# Patient Record
Sex: MALE | Race: WHITE | NOT HISPANIC OR LATINO | Employment: FULL TIME
[De-identification: names, ages, dates, MRNs, and addresses within clinical notes are randomized per-mention and may not be internally consistent; named-entity substitution may affect disease eponyms.]

---

## 2017-03-03 ENCOUNTER — RECORDS - HEALTHEAST (OUTPATIENT)
Dept: ADMINISTRATIVE | Facility: OTHER | Age: 62
End: 2017-03-03

## 2017-06-23 ENCOUNTER — COMMUNICATION - HEALTHEAST (OUTPATIENT)
Dept: FAMILY MEDICINE | Facility: CLINIC | Age: 62
End: 2017-06-23

## 2017-06-23 DIAGNOSIS — E11.9 TYPE 2 DIABETES MELLITUS (H): ICD-10-CM

## 2017-06-23 DIAGNOSIS — E78.49 FAMILIAL COMBINED HYPERLIPIDEMIA: ICD-10-CM

## 2017-06-23 DIAGNOSIS — I10 UNSPECIFIED ESSENTIAL HYPERTENSION: ICD-10-CM

## 2017-07-13 ENCOUNTER — OFFICE VISIT - HEALTHEAST (OUTPATIENT)
Dept: FAMILY MEDICINE | Facility: CLINIC | Age: 62
End: 2017-07-13

## 2017-07-13 DIAGNOSIS — I10 UNSPECIFIED ESSENTIAL HYPERTENSION: ICD-10-CM

## 2017-07-13 DIAGNOSIS — E78.49 FAMILIAL COMBINED HYPERLIPIDEMIA: ICD-10-CM

## 2017-07-13 DIAGNOSIS — E66.9 OBESITY: ICD-10-CM

## 2017-07-13 DIAGNOSIS — E78.00 PURE HYPERCHOLESTEROLEMIA: ICD-10-CM

## 2017-07-13 DIAGNOSIS — E11.9 TYPE 2 DIABETES MELLITUS (H): ICD-10-CM

## 2017-07-13 DIAGNOSIS — I10 ESSENTIAL HYPERTENSION WITH GOAL BLOOD PRESSURE LESS THAN 130/80: ICD-10-CM

## 2017-07-13 DIAGNOSIS — E11.9 DIABETES MELLITUS (H): ICD-10-CM

## 2017-07-13 DIAGNOSIS — Z72.0 TOBACCO ABUSE: ICD-10-CM

## 2017-07-13 LAB — HBA1C MFR BLD: 7.3 % (ref 3.5–6)

## 2017-07-13 ASSESSMENT — MIFFLIN-ST. JEOR: SCORE: 2011.89

## 2017-07-14 ENCOUNTER — COMMUNICATION - HEALTHEAST (OUTPATIENT)
Dept: FAMILY MEDICINE | Facility: CLINIC | Age: 62
End: 2017-07-14

## 2018-01-26 ENCOUNTER — OFFICE VISIT - HEALTHEAST (OUTPATIENT)
Dept: FAMILY MEDICINE | Facility: CLINIC | Age: 63
End: 2018-01-26

## 2018-01-26 ENCOUNTER — COMMUNICATION - HEALTHEAST (OUTPATIENT)
Dept: FAMILY MEDICINE | Facility: CLINIC | Age: 63
End: 2018-01-26

## 2018-01-26 DIAGNOSIS — I10 ESSENTIAL HYPERTENSION: ICD-10-CM

## 2018-01-26 DIAGNOSIS — E78.00 PURE HYPERCHOLESTEROLEMIA: ICD-10-CM

## 2018-01-26 DIAGNOSIS — E78.49 FAMILIAL COMBINED HYPERLIPIDEMIA: ICD-10-CM

## 2018-01-26 DIAGNOSIS — E11.9 DIABETES MELLITUS (H): ICD-10-CM

## 2018-01-26 DIAGNOSIS — N52.9 IMPOTENCE OF ORGANIC ORIGIN: ICD-10-CM

## 2018-01-26 LAB
ALBUMIN SERPL-MCNC: 4.1 G/DL (ref 3.5–5)
ALP SERPL-CCNC: 65 U/L (ref 45–120)
ALT SERPL W P-5'-P-CCNC: 24 U/L (ref 0–45)
ANION GAP SERPL CALCULATED.3IONS-SCNC: 13 MMOL/L (ref 5–18)
AST SERPL W P-5'-P-CCNC: 19 U/L (ref 0–40)
BILIRUB SERPL-MCNC: 0.7 MG/DL (ref 0–1)
BUN SERPL-MCNC: 17 MG/DL (ref 8–22)
CALCIUM SERPL-MCNC: 9.2 MG/DL (ref 8.5–10.5)
CHLORIDE BLD-SCNC: 104 MMOL/L (ref 98–107)
CHOLEST SERPL-MCNC: 161 MG/DL
CO2 SERPL-SCNC: 26 MMOL/L (ref 22–31)
CREAT SERPL-MCNC: 0.84 MG/DL (ref 0.7–1.3)
FASTING STATUS PATIENT QL REPORTED: YES
GFR SERPL CREATININE-BSD FRML MDRD: >60 ML/MIN/1.73M2
GLUCOSE BLD-MCNC: 114 MG/DL (ref 70–125)
HBA1C MFR BLD: 7.7 % (ref 3.5–6)
HDLC SERPL-MCNC: 38 MG/DL
LDLC SERPL CALC-MCNC: 87 MG/DL
POTASSIUM BLD-SCNC: 3.9 MMOL/L (ref 3.5–5)
PROT SERPL-MCNC: 7 G/DL (ref 6–8)
PSA SERPL-MCNC: 1.3 NG/ML (ref 0–4.5)
SODIUM SERPL-SCNC: 143 MMOL/L (ref 136–145)
TRIGL SERPL-MCNC: 182 MG/DL
VIT B12 SERPL-MCNC: 696 PG/ML (ref 213–816)

## 2018-01-26 ASSESSMENT — MIFFLIN-ST. JEOR: SCORE: 2018.69

## 2018-02-07 ENCOUNTER — COMMUNICATION - HEALTHEAST (OUTPATIENT)
Dept: FAMILY MEDICINE | Facility: CLINIC | Age: 63
End: 2018-02-07

## 2018-03-05 ENCOUNTER — COMMUNICATION - HEALTHEAST (OUTPATIENT)
Dept: FAMILY MEDICINE | Facility: CLINIC | Age: 63
End: 2018-03-05

## 2018-03-06 ENCOUNTER — RECORDS - HEALTHEAST (OUTPATIENT)
Dept: ADMINISTRATIVE | Facility: OTHER | Age: 63
End: 2018-03-06

## 2018-03-07 ENCOUNTER — RECORDS - HEALTHEAST (OUTPATIENT)
Dept: ADMINISTRATIVE | Facility: OTHER | Age: 63
End: 2018-03-07

## 2018-03-19 ENCOUNTER — COMMUNICATION - HEALTHEAST (OUTPATIENT)
Dept: FAMILY MEDICINE | Facility: CLINIC | Age: 63
End: 2018-03-19

## 2018-03-20 ENCOUNTER — RECORDS - HEALTHEAST (OUTPATIENT)
Dept: ADMINISTRATIVE | Facility: OTHER | Age: 63
End: 2018-03-20

## 2018-05-18 ENCOUNTER — RECORDS - HEALTHEAST (OUTPATIENT)
Dept: GENERAL RADIOLOGY | Facility: CLINIC | Age: 63
End: 2018-05-18

## 2018-05-18 ENCOUNTER — OFFICE VISIT - HEALTHEAST (OUTPATIENT)
Dept: FAMILY MEDICINE | Facility: CLINIC | Age: 63
End: 2018-05-18

## 2018-05-18 DIAGNOSIS — M54.50 ACUTE RIGHT-SIDED LOW BACK PAIN WITHOUT SCIATICA: ICD-10-CM

## 2018-05-18 DIAGNOSIS — M54.50 LOW BACK PAIN: ICD-10-CM

## 2018-05-18 ASSESSMENT — MIFFLIN-ST. JEOR: SCORE: 2011.89

## 2018-05-21 ENCOUNTER — COMMUNICATION - HEALTHEAST (OUTPATIENT)
Dept: FAMILY MEDICINE | Facility: CLINIC | Age: 63
End: 2018-05-21

## 2018-05-22 ENCOUNTER — COMMUNICATION - HEALTHEAST (OUTPATIENT)
Dept: FAMILY MEDICINE | Facility: CLINIC | Age: 63
End: 2018-05-22

## 2018-07-27 ENCOUNTER — OFFICE VISIT - HEALTHEAST (OUTPATIENT)
Dept: FAMILY MEDICINE | Facility: CLINIC | Age: 63
End: 2018-07-27

## 2018-07-27 DIAGNOSIS — Z76.0 ENCOUNTER FOR MEDICATION REFILL: ICD-10-CM

## 2018-07-27 DIAGNOSIS — E11.9 DIABETES MELLITUS (H): ICD-10-CM

## 2018-07-27 DIAGNOSIS — E78.49 FAMILIAL COMBINED HYPERLIPIDEMIA: ICD-10-CM

## 2018-07-27 DIAGNOSIS — I10 ESSENTIAL HYPERTENSION: ICD-10-CM

## 2018-07-27 LAB
ALBUMIN SERPL-MCNC: 4.3 G/DL (ref 3.5–5)
ALP SERPL-CCNC: 56 U/L (ref 45–120)
ALT SERPL W P-5'-P-CCNC: 22 U/L (ref 0–45)
ANION GAP SERPL CALCULATED.3IONS-SCNC: 13 MMOL/L (ref 5–18)
AST SERPL W P-5'-P-CCNC: 24 U/L (ref 0–40)
BILIRUB SERPL-MCNC: 0.9 MG/DL (ref 0–1)
BUN SERPL-MCNC: 18 MG/DL (ref 8–22)
CALCIUM SERPL-MCNC: 9.5 MG/DL (ref 8.5–10.5)
CHLORIDE BLD-SCNC: 105 MMOL/L (ref 98–107)
CHOLEST SERPL-MCNC: 134 MG/DL
CO2 SERPL-SCNC: 26 MMOL/L (ref 22–31)
CREAT SERPL-MCNC: 0.94 MG/DL (ref 0.7–1.3)
FASTING STATUS PATIENT QL REPORTED: YES
GFR SERPL CREATININE-BSD FRML MDRD: >60 ML/MIN/1.73M2
GLUCOSE BLD-MCNC: 76 MG/DL (ref 70–125)
HBA1C MFR BLD: 6.5 % (ref 3.5–6)
HDLC SERPL-MCNC: 33 MG/DL
LDLC SERPL CALC-MCNC: 80 MG/DL
POTASSIUM BLD-SCNC: 4 MMOL/L (ref 3.5–5)
PROT SERPL-MCNC: 6.8 G/DL (ref 6–8)
SODIUM SERPL-SCNC: 144 MMOL/L (ref 136–145)
TRIGL SERPL-MCNC: 107 MG/DL
VIT B12 SERPL-MCNC: 420 PG/ML (ref 213–816)

## 2018-07-27 ASSESSMENT — MIFFLIN-ST. JEOR: SCORE: 1939.31

## 2018-07-28 ENCOUNTER — COMMUNICATION - HEALTHEAST (OUTPATIENT)
Dept: FAMILY MEDICINE | Facility: CLINIC | Age: 63
End: 2018-07-28

## 2018-07-28 DIAGNOSIS — E78.49 FAMILIAL COMBINED HYPERLIPIDEMIA: ICD-10-CM

## 2018-09-08 ENCOUNTER — RECORDS - HEALTHEAST (OUTPATIENT)
Dept: ADMINISTRATIVE | Facility: OTHER | Age: 63
End: 2018-09-08

## 2018-10-21 ENCOUNTER — RECORDS - HEALTHEAST (OUTPATIENT)
Dept: ADMINISTRATIVE | Facility: OTHER | Age: 63
End: 2018-10-21

## 2019-01-24 ENCOUNTER — OFFICE VISIT - HEALTHEAST (OUTPATIENT)
Dept: FAMILY MEDICINE | Facility: CLINIC | Age: 64
End: 2019-01-24

## 2019-01-24 DIAGNOSIS — I10 ESSENTIAL HYPERTENSION: ICD-10-CM

## 2019-01-24 DIAGNOSIS — E78.49 FAMILIAL COMBINED HYPERLIPIDEMIA: ICD-10-CM

## 2019-01-24 DIAGNOSIS — E78.00 PURE HYPERCHOLESTEROLEMIA: ICD-10-CM

## 2019-01-24 DIAGNOSIS — E11.9 DIABETES MELLITUS (H): ICD-10-CM

## 2019-01-24 LAB
ALBUMIN SERPL-MCNC: 4.2 G/DL (ref 3.5–5)
ALP SERPL-CCNC: 56 U/L (ref 45–120)
ALT SERPL W P-5'-P-CCNC: 22 U/L (ref 0–45)
ANION GAP SERPL CALCULATED.3IONS-SCNC: 12 MMOL/L (ref 5–18)
AST SERPL W P-5'-P-CCNC: 19 U/L (ref 0–40)
BILIRUB SERPL-MCNC: 0.6 MG/DL (ref 0–1)
BUN SERPL-MCNC: 18 MG/DL (ref 8–22)
CALCIUM SERPL-MCNC: 9.3 MG/DL (ref 8.5–10.5)
CHLORIDE BLD-SCNC: 104 MMOL/L (ref 98–107)
CO2 SERPL-SCNC: 28 MMOL/L (ref 22–31)
CREAT SERPL-MCNC: 0.97 MG/DL (ref 0.7–1.3)
CREAT UR-MCNC: 132 MG/DL
GFR SERPL CREATININE-BSD FRML MDRD: >60 ML/MIN/1.73M2
GLUCOSE BLD-MCNC: 117 MG/DL (ref 70–125)
HBA1C MFR BLD: 6.8 % (ref 3.5–6)
MICROALBUMIN UR-MCNC: 6.47 MG/DL (ref 0–1.99)
MICROALBUMIN/CREAT UR: 49 MG/G
POTASSIUM BLD-SCNC: 4 MMOL/L (ref 3.5–5)
PROT SERPL-MCNC: 7 G/DL (ref 6–8)
SODIUM SERPL-SCNC: 144 MMOL/L (ref 136–145)

## 2019-01-24 ASSESSMENT — MIFFLIN-ST. JEOR: SCORE: 1971.06

## 2019-01-25 ENCOUNTER — RECORDS - HEALTHEAST (OUTPATIENT)
Dept: ADMINISTRATIVE | Facility: OTHER | Age: 64
End: 2019-01-25

## 2019-04-23 ENCOUNTER — COMMUNICATION - HEALTHEAST (OUTPATIENT)
Dept: SCHEDULING | Facility: CLINIC | Age: 64
End: 2019-04-23

## 2019-04-23 ENCOUNTER — COMMUNICATION - HEALTHEAST (OUTPATIENT)
Dept: FAMILY MEDICINE | Facility: CLINIC | Age: 64
End: 2019-04-23

## 2019-04-23 ENCOUNTER — OFFICE VISIT - HEALTHEAST (OUTPATIENT)
Dept: FAMILY MEDICINE | Facility: CLINIC | Age: 64
End: 2019-04-23

## 2019-04-23 DIAGNOSIS — R04.0 BLOODY NOSE: ICD-10-CM

## 2019-04-23 ASSESSMENT — MIFFLIN-ST. JEOR: SCORE: 2002.82

## 2019-04-24 ENCOUNTER — OFFICE VISIT - HEALTHEAST (OUTPATIENT)
Dept: OTOLARYNGOLOGY | Facility: CLINIC | Age: 64
End: 2019-04-24

## 2019-04-24 DIAGNOSIS — R04.0 EPISTAXIS: ICD-10-CM

## 2019-06-11 ENCOUNTER — OFFICE VISIT - HEALTHEAST (OUTPATIENT)
Dept: FAMILY MEDICINE | Facility: CLINIC | Age: 64
End: 2019-06-11

## 2019-06-11 DIAGNOSIS — N50.819 TESTICLE PAIN: ICD-10-CM

## 2019-06-11 LAB
ALBUMIN UR-MCNC: NEGATIVE MG/DL
APPEARANCE UR: CLEAR
BILIRUB UR QL STRIP: NEGATIVE
COLOR UR AUTO: YELLOW
GLUCOSE UR STRIP-MCNC: NEGATIVE MG/DL
HGB UR QL STRIP: NEGATIVE
KETONES UR STRIP-MCNC: NEGATIVE MG/DL
LEUKOCYTE ESTERASE UR QL STRIP: NEGATIVE
NITRATE UR QL: NEGATIVE
PH UR STRIP: 7 [PH] (ref 5–8)
SP GR UR STRIP: 1.02 (ref 1–1.03)
UROBILINOGEN UR STRIP-ACNC: NORMAL

## 2019-06-11 ASSESSMENT — MIFFLIN-ST. JEOR: SCORE: 1993.74

## 2019-06-13 ENCOUNTER — COMMUNICATION - HEALTHEAST (OUTPATIENT)
Dept: FAMILY MEDICINE | Facility: CLINIC | Age: 64
End: 2019-06-13

## 2019-06-14 ENCOUNTER — RECORDS - HEALTHEAST (OUTPATIENT)
Dept: ADMINISTRATIVE | Facility: OTHER | Age: 64
End: 2019-06-14

## 2019-06-18 ENCOUNTER — COMMUNICATION - HEALTHEAST (OUTPATIENT)
Dept: FAMILY MEDICINE | Facility: CLINIC | Age: 64
End: 2019-06-18

## 2019-07-12 ENCOUNTER — OFFICE VISIT - HEALTHEAST (OUTPATIENT)
Dept: FAMILY MEDICINE | Facility: CLINIC | Age: 64
End: 2019-07-12

## 2019-07-12 DIAGNOSIS — Z23 NEED FOR VACCINATION: ICD-10-CM

## 2019-07-12 DIAGNOSIS — E78.00 PURE HYPERCHOLESTEROLEMIA: ICD-10-CM

## 2019-07-12 DIAGNOSIS — G47.33 OBSTRUCTIVE SLEEP APNEA (ADULT) (PEDIATRIC): ICD-10-CM

## 2019-07-12 DIAGNOSIS — E11.9 DIABETES MELLITUS (H): ICD-10-CM

## 2019-07-12 DIAGNOSIS — I10 ESSENTIAL HYPERTENSION: ICD-10-CM

## 2019-07-12 LAB
ANION GAP SERPL CALCULATED.3IONS-SCNC: 12 MMOL/L (ref 5–18)
BUN SERPL-MCNC: 16 MG/DL (ref 8–22)
CALCIUM SERPL-MCNC: 9.4 MG/DL (ref 8.5–10.5)
CHLORIDE BLD-SCNC: 104 MMOL/L (ref 98–107)
CHOLEST SERPL-MCNC: 161 MG/DL
CO2 SERPL-SCNC: 26 MMOL/L (ref 22–31)
CREAT SERPL-MCNC: 0.84 MG/DL (ref 0.7–1.3)
FASTING STATUS PATIENT QL REPORTED: YES
GFR SERPL CREATININE-BSD FRML MDRD: >60 ML/MIN/1.73M2
GLUCOSE BLD-MCNC: 108 MG/DL (ref 70–125)
HBA1C MFR BLD: 6.9 % (ref 3.5–6)
HDLC SERPL-MCNC: 41 MG/DL
LDLC SERPL CALC-MCNC: 89 MG/DL
POTASSIUM BLD-SCNC: 3.7 MMOL/L (ref 3.5–5)
PSA SERPL-MCNC: 1 NG/ML (ref 0–4.5)
SODIUM SERPL-SCNC: 142 MMOL/L (ref 136–145)
TRIGL SERPL-MCNC: 157 MG/DL
VIT B12 SERPL-MCNC: 339 PG/ML (ref 213–816)

## 2019-07-12 ASSESSMENT — MIFFLIN-ST. JEOR: SCORE: 1984.67

## 2019-10-11 ENCOUNTER — COMMUNICATION - HEALTHEAST (OUTPATIENT)
Dept: FAMILY MEDICINE | Facility: CLINIC | Age: 64
End: 2019-10-11

## 2019-10-11 DIAGNOSIS — E11.9 DIABETES MELLITUS (H): ICD-10-CM

## 2019-10-14 ENCOUNTER — OFFICE VISIT - HEALTHEAST (OUTPATIENT)
Dept: FAMILY MEDICINE | Facility: CLINIC | Age: 64
End: 2019-10-14

## 2019-10-14 DIAGNOSIS — R10.32 LEFT GROIN PAIN: ICD-10-CM

## 2019-10-14 DIAGNOSIS — M54.42 ACUTE LEFT-SIDED LOW BACK PAIN WITH LEFT-SIDED SCIATICA: ICD-10-CM

## 2019-10-14 ASSESSMENT — MIFFLIN-ST. JEOR: SCORE: 1984.67

## 2019-11-07 ENCOUNTER — OFFICE VISIT - HEALTHEAST (OUTPATIENT)
Dept: FAMILY MEDICINE | Facility: CLINIC | Age: 64
End: 2019-11-07

## 2019-11-07 DIAGNOSIS — R29.898 LEFT LEG WEAKNESS: ICD-10-CM

## 2019-11-07 DIAGNOSIS — M54.42 ACUTE BILATERAL LOW BACK PAIN WITH LEFT-SIDED SCIATICA: ICD-10-CM

## 2019-11-18 ENCOUNTER — HOSPITAL ENCOUNTER (OUTPATIENT)
Dept: MRI IMAGING | Facility: CLINIC | Age: 64
Discharge: HOME | End: 2019-11-18
Attending: NURSE PRACTITIONER

## 2019-11-18 DIAGNOSIS — R29.898 LEFT LEG WEAKNESS: ICD-10-CM

## 2019-11-18 DIAGNOSIS — M54.42 ACUTE BILATERAL LOW BACK PAIN WITH LEFT-SIDED SCIATICA: ICD-10-CM

## 2019-11-18 LAB
CREAT BLD-MCNC: 0.7 MG/DL (ref 0.7–1.3)
GFR SERPL CREATININE-BSD FRML MDRD: >60 ML/MIN/1.73M2

## 2019-11-26 ENCOUNTER — OFFICE VISIT - HEALTHEAST (OUTPATIENT)
Dept: PHYSICAL MEDICINE AND REHAB | Facility: REHABILITATION | Age: 64
End: 2019-11-26

## 2019-11-26 DIAGNOSIS — M25.452 EFFUSION OF LEFT HIP: ICD-10-CM

## 2019-11-26 DIAGNOSIS — M47.816 LUMBAR SPONDYLOSIS: ICD-10-CM

## 2019-11-26 DIAGNOSIS — M25.552 HIP PAIN, LEFT: ICD-10-CM

## 2019-11-26 ASSESSMENT — MIFFLIN-ST. JEOR: SCORE: 1984.67

## 2019-12-05 ENCOUNTER — HOSPITAL ENCOUNTER (OUTPATIENT)
Dept: MRI IMAGING | Facility: CLINIC | Age: 64
Discharge: HOME | End: 2019-12-05
Attending: PHYSICAL MEDICINE & REHABILITATION

## 2019-12-05 DIAGNOSIS — M25.452 EFFUSION OF LEFT HIP: ICD-10-CM

## 2019-12-05 DIAGNOSIS — M25.552 HIP PAIN, LEFT: ICD-10-CM

## 2019-12-06 ENCOUNTER — COMMUNICATION - HEALTHEAST (OUTPATIENT)
Dept: PHYSICAL MEDICINE AND REHAB | Facility: CLINIC | Age: 64
End: 2019-12-06

## 2019-12-06 DIAGNOSIS — M84.352A STRESS FRACTURE OF LEFT HIP: ICD-10-CM

## 2019-12-06 DIAGNOSIS — M16.12 OSTEOARTHRITIS OF LEFT HIP, UNSPECIFIED OSTEOARTHRITIS TYPE: ICD-10-CM

## 2019-12-12 ENCOUNTER — RECORDS - HEALTHEAST (OUTPATIENT)
Dept: ADMINISTRATIVE | Facility: OTHER | Age: 64
End: 2019-12-12

## 2020-01-09 ENCOUNTER — RECORDS - HEALTHEAST (OUTPATIENT)
Dept: ADMINISTRATIVE | Facility: OTHER | Age: 65
End: 2020-01-09

## 2020-01-09 ENCOUNTER — OFFICE VISIT - HEALTHEAST (OUTPATIENT)
Dept: FAMILY MEDICINE | Facility: CLINIC | Age: 65
End: 2020-01-09

## 2020-01-09 DIAGNOSIS — M25.552 HIP PAIN, LEFT: ICD-10-CM

## 2020-01-09 DIAGNOSIS — E78.00 PURE HYPERCHOLESTEROLEMIA: ICD-10-CM

## 2020-01-09 DIAGNOSIS — G47.33 OBSTRUCTIVE SLEEP APNEA (ADULT) (PEDIATRIC): ICD-10-CM

## 2020-01-09 DIAGNOSIS — I10 ESSENTIAL HYPERTENSION: ICD-10-CM

## 2020-01-09 DIAGNOSIS — E11.9 DIABETES MELLITUS (H): ICD-10-CM

## 2020-01-09 LAB — HBA1C MFR BLD: 7.7 % (ref 3.5–6)

## 2020-01-09 ASSESSMENT — MIFFLIN-ST. JEOR: SCORE: 1972.77

## 2020-01-10 LAB
ANION GAP SERPL CALCULATED.3IONS-SCNC: 12 MMOL/L (ref 5–18)
BUN SERPL-MCNC: 21 MG/DL (ref 8–22)
CALCIUM SERPL-MCNC: 9.8 MG/DL (ref 8.5–10.5)
CHLORIDE BLD-SCNC: 104 MMOL/L (ref 98–107)
CHOLEST SERPL-MCNC: 174 MG/DL
CO2 SERPL-SCNC: 29 MMOL/L (ref 22–31)
CREAT SERPL-MCNC: 1.05 MG/DL (ref 0.7–1.3)
CREAT UR-MCNC: 207.4 MG/DL
FASTING STATUS PATIENT QL REPORTED: YES
GFR SERPL CREATININE-BSD FRML MDRD: >60 ML/MIN/1.73M2
GLUCOSE BLD-MCNC: 127 MG/DL (ref 70–125)
HDLC SERPL-MCNC: 40 MG/DL
LDLC SERPL CALC-MCNC: 103 MG/DL
MICROALBUMIN UR-MCNC: 9.87 MG/DL (ref 0–1.99)
MICROALBUMIN/CREAT UR: 47.6 MG/G
POTASSIUM BLD-SCNC: 3.5 MMOL/L (ref 3.5–5)
SODIUM SERPL-SCNC: 145 MMOL/L (ref 136–145)
TRIGL SERPL-MCNC: 156 MG/DL

## 2020-06-16 ENCOUNTER — COMMUNICATION - HEALTHEAST (OUTPATIENT)
Dept: FAMILY MEDICINE | Facility: CLINIC | Age: 65
End: 2020-06-16

## 2020-06-19 ENCOUNTER — RECORDS - HEALTHEAST (OUTPATIENT)
Dept: ADMINISTRATIVE | Facility: OTHER | Age: 65
End: 2020-06-19

## 2020-06-29 ENCOUNTER — COMMUNICATION - HEALTHEAST (OUTPATIENT)
Dept: FAMILY MEDICINE | Facility: CLINIC | Age: 65
End: 2020-06-29

## 2020-06-30 ENCOUNTER — COMMUNICATION - HEALTHEAST (OUTPATIENT)
Dept: FAMILY MEDICINE | Facility: CLINIC | Age: 65
End: 2020-06-30

## 2020-06-30 ENCOUNTER — OFFICE VISIT - HEALTHEAST (OUTPATIENT)
Dept: FAMILY MEDICINE | Facility: CLINIC | Age: 65
End: 2020-06-30

## 2020-06-30 DIAGNOSIS — E78.00 PURE HYPERCHOLESTEROLEMIA: ICD-10-CM

## 2020-06-30 DIAGNOSIS — E11.9 TYPE 2 DIABETES MELLITUS WITHOUT COMPLICATION, WITHOUT LONG-TERM CURRENT USE OF INSULIN (H): ICD-10-CM

## 2020-06-30 DIAGNOSIS — I10 ESSENTIAL HYPERTENSION: ICD-10-CM

## 2020-06-30 DIAGNOSIS — E11.9 DIABETES MELLITUS (H): ICD-10-CM

## 2020-06-30 DIAGNOSIS — Z00.00 WELLNESS EXAMINATION: ICD-10-CM

## 2020-06-30 LAB
ANION GAP SERPL CALCULATED.3IONS-SCNC: 13 MMOL/L (ref 5–18)
BUN SERPL-MCNC: 14 MG/DL (ref 8–22)
CALCIUM SERPL-MCNC: 8.9 MG/DL (ref 8.5–10.5)
CHLORIDE BLD-SCNC: 101 MMOL/L (ref 98–107)
CHOLEST SERPL-MCNC: 170 MG/DL
CO2 SERPL-SCNC: 26 MMOL/L (ref 22–31)
CREAT SERPL-MCNC: 0.85 MG/DL (ref 0.7–1.3)
ERYTHROCYTE [DISTWIDTH] IN BLOOD BY AUTOMATED COUNT: 12.1 % (ref 11–14.5)
FASTING STATUS PATIENT QL REPORTED: YES
GFR SERPL CREATININE-BSD FRML MDRD: >60 ML/MIN/1.73M2
GLUCOSE BLD-MCNC: 145 MG/DL (ref 70–125)
HBA1C MFR BLD: 6.7 % (ref 3.5–6)
HCT VFR BLD AUTO: 44.9 % (ref 40–54)
HDLC SERPL-MCNC: 38 MG/DL
HGB BLD-MCNC: 15.3 G/DL (ref 14–18)
LDLC SERPL CALC-MCNC: 106 MG/DL
MCH RBC QN AUTO: 31.7 PG (ref 27–34)
MCHC RBC AUTO-ENTMCNC: 34.1 G/DL (ref 32–36)
MCV RBC AUTO: 93 FL (ref 80–100)
PLATELET # BLD AUTO: 269 THOU/UL (ref 140–440)
PMV BLD AUTO: 7.4 FL (ref 7–10)
POTASSIUM BLD-SCNC: 3.9 MMOL/L (ref 3.5–5)
PSA SERPL-MCNC: 0.7 NG/ML (ref 0–4.5)
RBC # BLD AUTO: 4.83 MILL/UL (ref 4.4–6.2)
SODIUM SERPL-SCNC: 140 MMOL/L (ref 136–145)
TRIGL SERPL-MCNC: 131 MG/DL
WBC: 6.1 THOU/UL (ref 4–11)

## 2020-07-28 ENCOUNTER — COMMUNICATION - HEALTHEAST (OUTPATIENT)
Dept: FAMILY MEDICINE | Facility: CLINIC | Age: 65
End: 2020-07-28

## 2020-07-28 DIAGNOSIS — M25.552 HIP PAIN, LEFT: ICD-10-CM

## 2020-08-13 ENCOUNTER — RECORDS - HEALTHEAST (OUTPATIENT)
Dept: ADMINISTRATIVE | Facility: OTHER | Age: 65
End: 2020-08-13

## 2020-09-10 ENCOUNTER — OFFICE VISIT - HEALTHEAST (OUTPATIENT)
Dept: FAMILY MEDICINE | Facility: CLINIC | Age: 65
End: 2020-09-10

## 2020-09-10 DIAGNOSIS — M16.12 PRIMARY OSTEOARTHRITIS OF LEFT HIP: ICD-10-CM

## 2020-09-10 DIAGNOSIS — Z01.818 PRE-OPERATIVE EXAMINATION: ICD-10-CM

## 2020-09-10 DIAGNOSIS — K21.9 GASTROESOPHAGEAL REFLUX DISEASE WITHOUT ESOPHAGITIS: ICD-10-CM

## 2020-09-10 DIAGNOSIS — Z23 NEED FOR VACCINATION: ICD-10-CM

## 2020-09-10 LAB
ANION GAP SERPL CALCULATED.3IONS-SCNC: 11 MMOL/L (ref 5–18)
BUN SERPL-MCNC: 13 MG/DL (ref 8–22)
CALCIUM SERPL-MCNC: 9.4 MG/DL (ref 8.5–10.5)
CHLORIDE BLD-SCNC: 100 MMOL/L (ref 98–107)
CO2 SERPL-SCNC: 29 MMOL/L (ref 22–31)
CREAT SERPL-MCNC: 0.84 MG/DL (ref 0.7–1.3)
ERYTHROCYTE [DISTWIDTH] IN BLOOD BY AUTOMATED COUNT: 12.4 % (ref 11–14.5)
GFR SERPL CREATININE-BSD FRML MDRD: >60 ML/MIN/1.73M2
GLUCOSE BLD-MCNC: 134 MG/DL (ref 70–125)
HCT VFR BLD AUTO: 46.1 % (ref 40–54)
HGB BLD-MCNC: 15.5 G/DL (ref 14–18)
MCH RBC QN AUTO: 30.3 PG (ref 27–34)
MCHC RBC AUTO-ENTMCNC: 33.7 G/DL (ref 32–36)
MCV RBC AUTO: 90 FL (ref 80–100)
PLATELET # BLD AUTO: 272 THOU/UL (ref 140–440)
PMV BLD AUTO: 7.2 FL (ref 7–10)
POTASSIUM BLD-SCNC: 4.1 MMOL/L (ref 3.5–5)
RBC # BLD AUTO: 5.12 MILL/UL (ref 4.4–6.2)
SODIUM SERPL-SCNC: 140 MMOL/L (ref 136–145)
WBC: 6.6 THOU/UL (ref 4–11)

## 2020-09-10 ASSESSMENT — MIFFLIN-ST. JEOR: SCORE: 1995.45

## 2020-09-15 LAB
ATRIAL RATE - MUSE: 70 BPM
DIASTOLIC BLOOD PRESSURE - MUSE: NORMAL
INTERPRETATION ECG - MUSE: NORMAL
P AXIS - MUSE: 21 DEGREES
PR INTERVAL - MUSE: 212 MS
QRS DURATION - MUSE: 104 MS
QT - MUSE: 408 MS
QTC - MUSE: 440 MS
R AXIS - MUSE: -25 DEGREES
SYSTOLIC BLOOD PRESSURE - MUSE: NORMAL
T AXIS - MUSE: 6 DEGREES
VENTRICULAR RATE- MUSE: 70 BPM

## 2020-09-21 ENCOUNTER — RECORDS - HEALTHEAST (OUTPATIENT)
Dept: ADMINISTRATIVE | Facility: OTHER | Age: 65
End: 2020-09-21

## 2020-10-06 ENCOUNTER — RECORDS - HEALTHEAST (OUTPATIENT)
Dept: ADMINISTRATIVE | Facility: OTHER | Age: 65
End: 2020-10-06

## 2020-10-13 ENCOUNTER — RECORDS - HEALTHEAST (OUTPATIENT)
Dept: ADMINISTRATIVE | Facility: OTHER | Age: 65
End: 2020-10-13

## 2020-10-26 ENCOUNTER — AMBULATORY - HEALTHEAST (OUTPATIENT)
Dept: FAMILY MEDICINE | Facility: CLINIC | Age: 65
End: 2020-10-26

## 2020-10-26 DIAGNOSIS — Z23 NEED FOR VACCINATION: ICD-10-CM

## 2020-10-27 ENCOUNTER — AMBULATORY - HEALTHEAST (OUTPATIENT)
Dept: NURSING | Facility: CLINIC | Age: 65
End: 2020-10-27

## 2020-10-27 DIAGNOSIS — Z23 NEED FOR VACCINATION: ICD-10-CM

## 2020-10-28 ENCOUNTER — COMMUNICATION - HEALTHEAST (OUTPATIENT)
Dept: FAMILY MEDICINE | Facility: CLINIC | Age: 65
End: 2020-10-28

## 2020-10-28 DIAGNOSIS — E11.9 DIABETES MELLITUS (H): ICD-10-CM

## 2020-10-29 ENCOUNTER — COMMUNICATION - HEALTHEAST (OUTPATIENT)
Dept: FAMILY MEDICINE | Facility: CLINIC | Age: 65
End: 2020-10-29

## 2020-11-02 RX ORDER — BLOOD SUGAR DIAGNOSTIC
STRIP MISCELLANEOUS
Qty: 200 STRIP | Refills: 3 | Status: SHIPPED | OUTPATIENT
Start: 2020-11-02 | End: 2023-12-22

## 2020-11-03 ENCOUNTER — RECORDS - HEALTHEAST (OUTPATIENT)
Dept: ADMINISTRATIVE | Facility: OTHER | Age: 65
End: 2020-11-03

## 2020-11-05 ENCOUNTER — COMMUNICATION - HEALTHEAST (OUTPATIENT)
Dept: FAMILY MEDICINE | Facility: CLINIC | Age: 65
End: 2020-11-05

## 2020-11-05 DIAGNOSIS — G47.33 OBSTRUCTIVE SLEEP APNEA (ADULT) (PEDIATRIC): ICD-10-CM

## 2020-11-05 DIAGNOSIS — G47.33 OBSTRUCTIVE SLEEP APNEA (ADULT) (PEDIATRIC): Primary | ICD-10-CM

## 2020-12-31 ENCOUNTER — OFFICE VISIT - HEALTHEAST (OUTPATIENT)
Dept: FAMILY MEDICINE | Facility: CLINIC | Age: 65
End: 2020-12-31

## 2020-12-31 DIAGNOSIS — M54.50 ACUTE MIDLINE LOW BACK PAIN WITHOUT SCIATICA: ICD-10-CM

## 2020-12-31 DIAGNOSIS — G47.33 OBSTRUCTIVE SLEEP APNEA (ADULT) (PEDIATRIC): ICD-10-CM

## 2020-12-31 DIAGNOSIS — E78.49 FAMILIAL COMBINED HYPERLIPIDEMIA: ICD-10-CM

## 2020-12-31 DIAGNOSIS — I10 ESSENTIAL HYPERTENSION: ICD-10-CM

## 2020-12-31 DIAGNOSIS — E66.01 MORBID OBESITY (H): ICD-10-CM

## 2020-12-31 DIAGNOSIS — R05.9 COUGH: ICD-10-CM

## 2020-12-31 DIAGNOSIS — L72.3 SEBACEOUS CYST: ICD-10-CM

## 2020-12-31 DIAGNOSIS — Z11.59 ENCOUNTER FOR HEPATITIS C SCREENING TEST FOR LOW RISK PATIENT: ICD-10-CM

## 2020-12-31 DIAGNOSIS — E11.9 TYPE 2 DIABETES MELLITUS WITHOUT COMPLICATION, WITHOUT LONG-TERM CURRENT USE OF INSULIN (H): ICD-10-CM

## 2020-12-31 LAB
ANION GAP SERPL CALCULATED.3IONS-SCNC: 11 MMOL/L (ref 5–18)
BUN SERPL-MCNC: 12 MG/DL (ref 8–22)
CALCIUM SERPL-MCNC: 8.8 MG/DL (ref 8.5–10.5)
CHLORIDE BLD-SCNC: 102 MMOL/L (ref 98–107)
CHOLEST SERPL-MCNC: 159 MG/DL
CO2 SERPL-SCNC: 28 MMOL/L (ref 22–31)
CREAT SERPL-MCNC: 0.79 MG/DL (ref 0.7–1.3)
CREAT UR-MCNC: 96.2 MG/DL
FASTING STATUS PATIENT QL REPORTED: YES
GFR SERPL CREATININE-BSD FRML MDRD: >60 ML/MIN/1.73M2
GLUCOSE BLD-MCNC: 137 MG/DL (ref 70–125)
HBA1C MFR BLD: 7.3 %
HDLC SERPL-MCNC: 39 MG/DL
LDLC SERPL CALC-MCNC: 85 MG/DL
MICROALBUMIN UR-MCNC: 14.2 MG/DL (ref 0–1.99)
MICROALBUMIN/CREAT UR: 147.6 MG/G
POTASSIUM BLD-SCNC: 3.6 MMOL/L (ref 3.5–5)
SODIUM SERPL-SCNC: 141 MMOL/L (ref 136–145)
TRIGL SERPL-MCNC: 175 MG/DL
VIT B12 SERPL-MCNC: 420 PG/ML (ref 213–816)

## 2020-12-31 RX ORDER — AMLODIPINE BESYLATE 10 MG/1
10 TABLET ORAL DAILY
Qty: 90 TABLET | Refills: 1 | Status: SHIPPED | OUTPATIENT
Start: 2020-12-31 | End: 2021-12-28

## 2020-12-31 RX ORDER — GLIPIZIDE 5 MG/1
5 TABLET ORAL
Qty: 180 TABLET | Refills: 1 | Status: SHIPPED | OUTPATIENT
Start: 2020-12-31 | End: 2021-12-28

## 2020-12-31 ASSESSMENT — MIFFLIN-ST. JEOR: SCORE: 2003.1

## 2021-01-01 ENCOUNTER — COMMUNICATION - HEALTHEAST (OUTPATIENT)
Dept: FAMILY MEDICINE | Facility: CLINIC | Age: 66
End: 2021-01-01

## 2021-01-01 LAB — HCV AB SERPL QL IA: NEGATIVE

## 2021-01-02 ENCOUNTER — AMBULATORY - HEALTHEAST (OUTPATIENT)
Dept: FAMILY MEDICINE | Facility: CLINIC | Age: 66
End: 2021-01-02

## 2021-01-02 DIAGNOSIS — E78.49 FAMILIAL COMBINED HYPERLIPIDEMIA: ICD-10-CM

## 2021-01-02 RX ORDER — SIMVASTATIN 80 MG
80 TABLET ORAL EVERY EVENING
Qty: 90 TABLET | Refills: 1 | Status: SHIPPED | OUTPATIENT
Start: 2021-01-02 | End: 2021-12-28

## 2021-01-08 ENCOUNTER — AMBULATORY - HEALTHEAST (OUTPATIENT)
Dept: FAMILY MEDICINE | Facility: CLINIC | Age: 66
End: 2021-01-08

## 2021-01-08 DIAGNOSIS — I10 ESSENTIAL HYPERTENSION: ICD-10-CM

## 2021-01-26 ENCOUNTER — AMBULATORY - HEALTHEAST (OUTPATIENT)
Dept: FAMILY MEDICINE | Facility: CLINIC | Age: 66
End: 2021-01-26

## 2021-01-26 DIAGNOSIS — I10 ESSENTIAL HYPERTENSION: ICD-10-CM

## 2021-02-15 ENCOUNTER — COMMUNICATION - HEALTHEAST (OUTPATIENT)
Dept: FAMILY MEDICINE | Facility: CLINIC | Age: 66
End: 2021-02-15

## 2021-02-15 ENCOUNTER — AMBULATORY - HEALTHEAST (OUTPATIENT)
Dept: FAMILY MEDICINE | Facility: CLINIC | Age: 66
End: 2021-02-15

## 2021-02-15 DIAGNOSIS — E78.49 FAMILIAL COMBINED HYPERLIPIDEMIA: ICD-10-CM

## 2021-02-15 DIAGNOSIS — I10 ESSENTIAL HYPERTENSION: ICD-10-CM

## 2021-03-05 ENCOUNTER — AMBULATORY - HEALTHEAST (OUTPATIENT)
Dept: FAMILY MEDICINE | Facility: CLINIC | Age: 66
End: 2021-03-05

## 2021-03-05 DIAGNOSIS — I10 ESSENTIAL HYPERTENSION: ICD-10-CM

## 2021-03-05 RX ORDER — HYDROCHLOROTHIAZIDE 25 MG/1
25 TABLET ORAL DAILY
Qty: 90 TABLET | Refills: 1 | Status: SHIPPED | OUTPATIENT
Start: 2021-03-05 | End: 2021-12-28

## 2021-03-05 RX ORDER — LOSARTAN POTASSIUM 100 MG/1
100 TABLET ORAL DAILY
Qty: 90 TABLET | Refills: 1 | Status: SHIPPED | OUTPATIENT
Start: 2021-03-05 | End: 2021-12-28

## 2021-03-05 RX ORDER — METOPROLOL SUCCINATE 25 MG/1
25 TABLET, EXTENDED RELEASE ORAL DAILY
Qty: 90 TABLET | Refills: 1 | Status: SHIPPED | OUTPATIENT
Start: 2021-03-05 | End: 2021-12-28

## 2021-03-27 ENCOUNTER — AMBULATORY - HEALTHEAST (OUTPATIENT)
Dept: NURSING | Facility: CLINIC | Age: 66
End: 2021-03-27

## 2021-04-17 ENCOUNTER — AMBULATORY - HEALTHEAST (OUTPATIENT)
Dept: NURSING | Facility: CLINIC | Age: 66
End: 2021-04-17

## 2021-05-28 NOTE — TELEPHONE ENCOUNTER
"Calling to make appt.    Last Wednesday at 4am felt \"runny nose\" took CPAP off and noticed had nose bleed out of right nostril.  Stopped it with toilet paper in nose.  Says he lost about \"a cup of blood.\"  Bleeding stopped after 20 minutes. Used mentholatum in both nostrils to moisten.    Today at 4am same thing happened \"but not as severe.\" Bleeding stopped after 10 minutes.  Had CPAP on this time too.  Has had CPAP for 15+ years. Does not get regular CPAP checks.     States he never really had bloody noses most of life.    Has \"hay fever\" takes Claritin daily.    Caller transferred to scheduling.  Scheduled for today at 10:20am.    Rupal Barry RN  Triage Nurse Advisor      Reason for Disposition    Patient wants to be seen    Protocols used: NOSEBLEED-A-OH      "

## 2021-05-28 NOTE — PROGRESS NOTES
1. Bloody nose  Ambulatory referral to ENT         ASSESSMENT/PLAN:     The following high BMI interventions were performed this visit: encouragement to exercise and weight monitoring    1. Bloody nose    -left nare bleeding stopped with 2 gunn applications of silver nitrate, patient tolerated procedure well, continues to have bleeding from right nare that I am unable to visualize  - Ambulatory referral to ENT      There are no Patient Instructions on file for this visit.  There are no discontinued medications.  Return if symptoms worsen or fail to improve, for bloody nose.         Imelda Beatty NP          SUBJECTIVE:  Ashutosh Vieira is a 63 y.o. male who presents for evaluation of his ongoing bloody noses.  Onset: April 17, around 4 AM.  Patient woke up in the middle of the night because he felt a trickle on his nose, he does wear CPAP every night to bed.  Initially, he thought he had some nasal secretions coming out of the nose but when he sat up he realized his mask was full of blood.  He immediately went to the bathroom and washed his nose often jammed some toilet paper of his nose several different times and after 15 minutes the bleeding stopped.  He did apply some lubricant inside the nare and he had no return of bleeding at that time.  He had been taking an antihistamine, specifically Claritin prior to the bleeding starting for his seasonal allergies.  He has not taken any Claritin since April 16.  This morning around 4 AM again, he felt the trickle return and when he woke up he had another bloody nose, he states it was not as bad as it was from the week prior but he did have to jam some toilet paper up his nose again and when he looked on [employer] he did pinch the nose and the bleeding resolved after roughly 10 minutes.  He was very specific with the bleeding and states that it comes out of the right nare and runs out like a faucet.  He is currently not taking any blood thinners.  Suspect that his nose  is rather dry from the recent antihistamine use and his use of CPAP at night.  On examination today, I am able to see a small ooze coming from the upper left nare, I am not able to visualize the bleeding coming from the right nare however.  Refer to ENT for further evaluation and possible intervention today. VSS.   Chief Complaint   Patient presents with     Epistaxis     2 episodes RT nostril         Patient Active Problem List   Diagnosis     Male Erectile Disorder Due To Physical Condition     Plantar Fasciitis     Prepatellar Bursitis     Essential Hypercholesterolemia     Obesity     Obstructive Sleep Apnea     Essential Hypertension     Benign Adenomatous Polyp Of The Large Intestine     Allergic Rhinitis     Diabetes mellitus (H)     Tinnitus     Familial combined hyperlipidemia     Acute right-sided low back pain without sciatica       Current Outpatient Medications   Medication Sig Dispense Refill     amLODIPine (NORVASC) 10 MG tablet Take 1 tablet (10 mg total) by mouth daily. 90 tablet 1     blood glucose test strips Use 100 each As Directed 2 (two) times a day. 200 strip 5     glipiZIDE (GLUCOTROL XL) 5 MG 24 hr tablet Take 1 tablet (5 mg total) by mouth daily with breakfast. 90 tablet 1     lancets (ACCU-CHEK MULTICLIX LANCET) Misc Use 100 each As Directed 2 (two) times a day. 200 each 0     loratadine (CLARITIN) 10 mg tablet Take 10 mg by mouth daily.       losartan (COZAAR) 25 MG tablet Take 1 tablet (25 mg total) by mouth daily. 90 tablet 1     metFORMIN (GLUCOPHAGE) 1000 MG tablet Take 1 tablet (1,000 mg total) by mouth 2 (two) times a day with meals. 180 tablet 1     simvastatin (ZOCOR) 40 MG tablet Take 1 tablet (40 mg total) by mouth at bedtime. 90 tablet 1     triamterene-hydrochlorothiazide (DYAZIDE) 37.5-25 mg per capsule Take 1 capsule by mouth daily. 90 capsule 1     No current facility-administered medications for this visit.        Social History     Tobacco Use   Smoking Status Current  Some Day Smoker   Smokeless Tobacco Never Used   Tobacco Comment    cigars       REVIEW OF SYSTEMS: Denies fever/chills/sore throat/rhinorrhea/ear pain/swollen glands/shortness of breath/discomfort of chest/abdominal pain/changes in bowel habits/urinary symptoms/rash.      TOBACCO USE:  Social History     Tobacco Use   Smoking Status Current Some Day Smoker   Smokeless Tobacco Never Used   Tobacco Comment    cigars     Social History     Socioeconomic History     Marital status:      Spouse name: Isela     Number of children: 2     Years of education: 16     Highest education level: Not on file   Occupational History     Occupation:    Social Needs     Financial resource strain: Not on file     Food insecurity:     Worry: Not on file     Inability: Not on file     Transportation needs:     Medical: Not on file     Non-medical: Not on file   Tobacco Use     Smoking status: Current Some Day Smoker     Smokeless tobacco: Never Used     Tobacco comment: cigars   Substance and Sexual Activity     Alcohol use: Yes     Comment: weekends     Drug use: No     Sexual activity: Yes     Partners: Female   Lifestyle     Physical activity:     Days per week: Not on file     Minutes per session: Not on file     Stress: Not on file   Relationships     Social connections:     Talks on phone: Not on file     Gets together: Not on file     Attends Zoroastrian service: Not on file     Active member of club or organization: Not on file     Attends meetings of clubs or organizations: Not on file     Relationship status: Not on file     Intimate partner violence:     Fear of current or ex partner: Not on file     Emotionally abused: Not on file     Physically abused: Not on file     Forced sexual activity: Not on file   Other Topics Concern     Not on file   Social History Narrative    Travels weekly for work as a .          OBJECTIVE:            Vitals:    04/23/19 1002   BP: 130/84   Pulse: 89   Resp: 16    Temp: 97.9  F (36.6  C)   SpO2: 93%     Weight: (!) 259 lb (117.5 kg)    Wt Readings from Last 3 Encounters:   04/23/19 (!) 259 lb (117.5 kg)   01/24/19 (!) 252 lb (114.3 kg)   07/27/18 (!) 245 lb (111.1 kg)     Body mass index is 35.13 kg/m .        Physical Exam:  GENERAL APPEARANCE: A&A, NAD, well hydrated, well nourished  HEAD: atraumatic, no deformity  EYES: PERRL, EOM's intact, no redness or swelling  NOSE: small ooze/bleeding noted in the left nare. Unable to visualize bleeding coming from right nare. No bleeding post silver nitrate application  MOUTH: without erythema, exudate or thrush  NECK: Supple, without lymphadenopathy, no thyroid mass, no JVD, no bruit  CV: RRR, no M/G/R   LUNGS: CTAB, normal respiratory effort  SKIN:  Normal skin turgor, no lesions/rashes, warm and dry

## 2021-05-28 NOTE — PROGRESS NOTES
HISTORY OF PRESENT ILLNESS  Asked to see by TAMEKA Beatty for evaluation of nosebleed. Never had a nosebleed in his life. Last week he developed a right sided nosebleed that took 15 minutes to stop. He attributed it to dryness. It developed again yesterday. He saw his PCP who cauterized something noticed on the left. She didn't see anything in the right. Currently on no blood thinners      REVIEW OF SYSTEMS  Review of Systems: a 10-system review was performed. Pertinent positives are noted in the HPI and on a separate scanned document in the chart.    PMH, PSH, FH and SH has documented in the EHR.      EXAM    CONSTITUTIONAL  General Appearance:  Normal, well developed, well nourished, no obvious distress  Ability to Communicate:  communicates appropriately.    HEAD AND FACE  Appearance and Symmetry:  Normal, no scalp or facial scarring or suspicious lesions.  Paranasal sinuses tenderness:  Normal, Paranasal sinuses non tender    EARS  Clinical speech reception threshold:  Normal, able to hear normal speech.  Auricle:  Normal, Auricles without scars, lesions, masses.  External auditory canal:  Normal, External auditory canal normal.  Tympanic membrane:  Normal, Tympanic membranes normal without swelling or erythema.  Tympanic membrane mobility:  Normal, Normal tympanic membrane mobility.    NOSE (speculum or scope)  Architecture:  Normal, Grossly normal external nasal architecture with no masses or lesions.  Mucosa:  Normal mucosa, No polyps or masses.  Septum: Right septal deformity.  Turbinates:  Normal, No turbinate abnormalities    NASAL ENDOSCOPY  The risks and benefits were reviewed with the patient.  The nose was sprayed with lidocaine and phenylephrine.  The following areas were examined:  floor, roof, middle and inferior turbinates, middle and inferior meatus, sphenoethmoidal recess, nasopharynx and septum.  The nasal scope passed without difficulty with the findings noted in the exam.    ORAL CAVITY AND  OROPHARYNX  Lips:  Normal.  Dental and gingiva:  Normal, No obvious dental or gingival disease.  Mucosa:  Normal, Moist mucous membranes.  Tongue:  Normal, Tongue mobile with no mucosal abnormalities  Hard and soft palate:  Normal, Hard and soft palate without cleft or mucosal lesions.  Oral pharynx:  Normal, Posterior pharynx without lesions or remarkable asymmetry.  Saliva:  Normal, Clear saliva.  Masses:  Normal, No palpable masses or pathologically enlarged lymph nodes.    NECK  Masses/lymph nodes:  Normal, No worrisome neck masses or lymph nodes.  Salivary glands:  Normal, Parotid and submandibular glands.  Trachea and larynx position:  Normal, Trachea and larynx midline.  Thyroid:  Normal, No thyroid abnormality.  Tenderness:  Normal, No cervical tenderness.  Suppleness:  Normal, Neck supple    NEUROLOGICAL  Speech pattern:  Normal, Proasaic    RESPIRATORY  Symmetry and Respiratory effort:  Normal, Symmetric chest movement and expansion with no increased intercostal retractions or use of accessory muscles.     IMPRESSION  Deviated septum with right sided spur likely the source of bleeding. No other potential source identified. He also has nasal obstruction with dependent congesiton.     RECOMMENDATION  Discussed septoplasty. He is going to think about. Follow up if bleeding.    Fahad Walter MD

## 2021-05-29 NOTE — PROGRESS NOTES
Chief Complaint   Patient presents with     Testicle Pain     left testicle pain started last wednesday, did some lifting not sure what caused pain?        HPI: 64-year-old male with past medical history of hypertension, diabetes and hyperlipidemia presents today with testicular pain since last Wednesday.  Last Wednesday he was lifting coolers at a company event and was aware that there was some pain in his groin region.  Since that time his testicles have been mildly tender but not swollen.  No recent intercourse.    Review of old notes show 1/24/2019 he was seen for hyperlipidemia hypertension and diabetes which were stable.    ROS: No fever.  No hematuria.  No testicular swelling.    SH:    reports that he has been smoking.  He has never used smokeless tobacco. He reports that he drinks alcohol. He reports that he does not use drugs.      FH: The Patient's family history includes Melanoma in his mother; No Medical Problems in his brother.     Meds:  Ashutosh has a current medication list which includes the following prescription(s): amlodipine, blood glucose test, glipizide, generic lancets, losartan, metformin, simvastatin, triamterene-hydrochlorothiazide, and loratadine.    O:  /82   Pulse 78   Ht 6' (1.829 m)   Wt (!) 257 lb (116.6 kg)   SpO2 95%   BMI 34.86 kg/m    Alert conversant no acute distress  Examination of the abdomen shows a soft nontender no masses  External genitalia normal  Testicles normal size shape and consistency  No evidence of inguinal hernia    UA normal    A/P:   1. Testicle pain  The patient has mild testicular pain with a normal UA and a normal physical exam.  No scrotal masses were appreciated.  Recommended waiting and watching.  If getting worse in any way or not improving in the next 2 weeks would consider further work-up.  Patient agreed to return if these should occur.  - Urinalysis

## 2021-05-29 NOTE — TELEPHONE ENCOUNTER
Fax came in on patient from [facility] oxygen [facility],

## 2021-05-29 NOTE — TELEPHONE ENCOUNTER
Fax came in on patient from [facility] oxygen and [facility].

## 2021-05-30 NOTE — PROGRESS NOTES
1. Diabetes mellitus (H)  Glycosylated Hemoglobin A1c    Basic Metabolic Panel    PSA, Annual Screen (Prostatic-Specific Antigen)    Vitamin B12    glipiZIDE (GLUCOTROL XL) 5 MG 24 hr tablet    metFORMIN (GLUCOPHAGE) 1000 MG tablet   2. Essential hypertension  Basic Metabolic Panel    amLODIPine (NORVASC) 10 MG tablet    losartan (COZAAR) 25 MG tablet    triamterene-hydrochlorothiazide (DYAZIDE) 37.5-25 mg per capsule   3. Essential Hypercholesterolemia  Lipid Cascade FASTING    simvastatin (ZOCOR) 40 MG tablet    CANCELED: Lipid Cascade RANDOM   4. Obstructive Sleep Apnea     5. Need for vaccination  Td, Preservative Free (green label)     The 10-year ASCVD risk score (Jenny GUAMAN Jr., et al., 2013) is: 28.9%    Values used to calculate the score:      Age: 64 years      Sex: Male      Is Non- : No      Diabetic: Yes      Tobacco smoker: Yes      Systolic Blood Pressure: 126 mmHg      Is BP treated: Yes      HDL Cholesterol: 33 mg/dL      Total Cholesterol: 134 mg/dL      ASSESSMENT/PLAN:     The following high BMI interventions were performed this visit: encouragement to exercise, weight monitoring, exercise promotion: strength training and exercise promotion: stretching    1. Diabetes mellitus (H)    - Glycosylated Hemoglobin A1c  - Basic Metabolic Panel  - PSA, Annual Screen (Prostatic-Specific Antigen)  - Vitamin B12  - glipiZIDE (GLUCOTROL XL) 5 MG 24 hr tablet; Take 1 tablet (5 mg total) by mouth daily with breakfast.  Dispense: 90 tablet; Refill: 1  - metFORMIN (GLUCOPHAGE) 1000 MG tablet; Take 1 tablet (1,000 mg total) by mouth 2 (two) times a day with meals.  Dispense: 180 tablet; Refill: 1    2. Essential hypertension    - Basic Metabolic Panel  - amLODIPine (NORVASC) 10 MG tablet; Take 1 tablet (10 mg total) by mouth daily.  Dispense: 90 tablet; Refill: 1  - losartan (COZAAR) 25 MG tablet; Take 1 tablet (25 mg total) by mouth daily.  Dispense: 90 tablet; Refill: 1  -  "triamterene-hydrochlorothiazide (DYAZIDE) 37.5-25 mg per capsule; Take 1 capsule by mouth daily.  Dispense: 90 capsule; Refill: 1    3. Essential Hypercholesterolemia    - Lipid Cascade FASTING  - simvastatin (ZOCOR) 40 MG tablet; Take 1 tablet (40 mg total) by mouth at bedtime.  Dispense: 90 tablet; Refill: 1    4. Obstructive Sleep Apnea    -recently evaluated by sleep medicine, has new equipment coming, new mask (nasal)    5. Need for vaccination    - Td, Preservative Free (green label)      There are no Patient Instructions on file for this visit.  Medications Discontinued During This Encounter   Medication Reason     loratadine (CLARITIN) 10 mg tablet Side effects     amLODIPine (NORVASC) 10 MG tablet Reorder     glipiZIDE (GLUCOTROL XL) 5 MG 24 hr tablet Reorder     losartan (COZAAR) 25 MG tablet Reorder     metFORMIN (GLUCOPHAGE) 1000 MG tablet Reorder     simvastatin (ZOCOR) 40 MG tablet Reorder     triamterene-hydrochlorothiazide (DYAZIDE) 37.5-25 mg per capsule Reorder     Return in about 6 months (around 1/12/2020) for hypertension, diabetes, hyperlipidemia, annual physical.          Imelda Beatty NP          SUBJECTIVE:  Ashutosh Vieira is a 64 y.o. male who presents for hypertension, hyperlipidemia, diabetes and sleep apnea follow-up.    Hypertension/hyperlipidemia: Well-controlled on current medications which include amlodipine, losartan and triamterene with hydrochlorthiazide.  He is not taking a daily baby aspirin due to having recent issues with nosebleeds.  He continues to take a statin.  He is not exercising, he has gained a few pounds since his last office visit in January.  He is planning to \"get back on track and lose some weight \". He does occasionally smoke cigars, alcohol use is socially but he denies any illicit drug use.  No history of coronary artery disease.      Type II  diabetes non-insulin-dependent: Current medications include glipizide 5 mg daily and metformin 1000 mg twice " "daily.  We did review his diet.  He typically skips breakfast and drinks 20 ounces of black coffee.  Lunch is usually around 11 AM and he typically always eats out.  Him and his wife do eat out majority of the time for dinner as well.  Snacks include cereal, chips or ice cream.  He does not drink soda, he does drink adequate amounts of water daily.  He does not check his blood sugars often, he only typically checks them if he feels \"goofy \".  The last time he felt that his blood sugars were off was a few months ago when he was driving and he actually experienced some blurry vision and pulled off the road.  Had not eaten yet that day which is what he attributed his low blood sugar to.  He denies any urinary changes.  No numbness or tingling in the feet, he does perform his own nail care.  He is overdue for his vision testing.      Sleep apnea: Was recently evaluated by sleep medicine, he does qualify for new equipment, he is going to be switching from a full facemask to a nasal method of oxygen delivery.      Health maintenance: Tetanus vaccination administered today.  He is up-to-date with colonoscopy, last performed in 2017, he does have diverticulosis with internal hemorrhoids and polyps.  He is scheduled to have his colonoscopy every 5 years.  He is due for PSA checked today.  Chief Complaint   Patient presents with     Follow-up     Med Check - HYpertension and DM         Patient Active Problem List   Diagnosis     Male Erectile Disorder Due To Physical Condition     Essential Hypercholesterolemia     Obesity     Obstructive Sleep Apnea     Essential Hypertension     Benign Adenomatous Polyp Of The Large Intestine     Allergic Rhinitis     Diabetes mellitus (H)     Tinnitus     Familial combined hyperlipidemia       Current Outpatient Medications   Medication Sig Dispense Refill     amLODIPine (NORVASC) 10 MG tablet Take 1 tablet (10 mg total) by mouth daily. 90 tablet 1     blood glucose test strips Use 100 " each As Directed 2 (two) times a day. 200 strip 5     glipiZIDE (GLUCOTROL XL) 5 MG 24 hr tablet Take 1 tablet (5 mg total) by mouth daily with breakfast. 90 tablet 1     lancets (ACCU-CHEK MULTICLIX LANCET) Misc Use 100 each As Directed 2 (two) times a day. 200 each 0     losartan (COZAAR) 25 MG tablet Take 1 tablet (25 mg total) by mouth daily. 90 tablet 1     metFORMIN (GLUCOPHAGE) 1000 MG tablet Take 1 tablet (1,000 mg total) by mouth 2 (two) times a day with meals. 180 tablet 1     simvastatin (ZOCOR) 40 MG tablet Take 1 tablet (40 mg total) by mouth at bedtime. 90 tablet 1     triamterene-hydrochlorothiazide (DYAZIDE) 37.5-25 mg per capsule Take 1 capsule by mouth daily. 90 capsule 1     No current facility-administered medications for this visit.        Social History     Tobacco Use   Smoking Status Current Some Day Smoker   Smokeless Tobacco Never Used   Tobacco Comment    cigars       REVIEW OF SYSTEMS:     TOBACCO USE:  Social History     Tobacco Use   Smoking Status Current Some Day Smoker   Smokeless Tobacco Never Used   Tobacco Comment    cigars     Social History     Socioeconomic History     Marital status:      Spouse name: Isela     Number of children: 2     Years of education: 16     Highest education level: Not on file   Occupational History     Occupation:    Social Needs     Financial resource strain: Not on file     Food insecurity:     Worry: Not on file     Inability: Not on file     Transportation needs:     Medical: Not on file     Non-medical: Not on file   Tobacco Use     Smoking status: Current Some Day Smoker     Smokeless tobacco: Never Used     Tobacco comment: cigars   Substance and Sexual Activity     Alcohol use: Yes     Comment: weekends     Drug use: No     Sexual activity: Yes     Partners: Female   Lifestyle     Physical activity:     Days per week: Not on file     Minutes per session: Not on file     Stress: Not on file   Relationships     Social  connections:     Talks on phone: Not on file     Gets together: Not on file     Attends Yazidi service: Not on file     Active member of club or organization: Not on file     Attends meetings of clubs or organizations: Not on file     Relationship status: Not on file     Intimate partner violence:     Fear of current or ex partner: Not on file     Emotionally abused: Not on file     Physically abused: Not on file     Forced sexual activity: Not on file   Other Topics Concern     Not on file   Social History Narrative    Travels weekly for work as a .          OBJECTIVE:            Vitals:    07/12/19 1502   BP: 126/78   Pulse: 70   Resp: 16   Temp: 98  F (36.7  C)   SpO2: 96%     Weight: (!) 255 lb (115.7 kg)    Wt Readings from Last 3 Encounters:   07/12/19 (!) 255 lb (115.7 kg)   06/11/19 (!) 257 lb (116.6 kg)   04/23/19 (!) 259 lb (117.5 kg)     Body mass index is 34.58 kg/m .        Physical Exam:  GENERAL APPEARANCE: A&A, NAD, well hydrated, well nourished  HEAD: atraumatic, no deformity  EYES: PERRL, EOM's intact, no redness or swelling  MOUTH: without erythema, exudate or thrush  NECK: Supple, without lymphadenopathy, no thyroid mass, no JVD, no bruit  CV: RRR, no M/G/R, no edema   LUNGS: CTAB, normal respiratory effort  ABDOMEN: S&NT, no masses, no organomegaly, BS present x4   SKIN:  Normal skin turgor, no lesions/rashes, warm and dry   NEURO: no gross deficits

## 2021-05-31 VITALS — WEIGHT: 262.5 LBS | HEIGHT: 72 IN | BODY MASS INDEX: 35.55 KG/M2

## 2021-05-31 VITALS — HEIGHT: 72 IN | WEIGHT: 261 LBS | BODY MASS INDEX: 35.35 KG/M2

## 2021-06-01 VITALS — BODY MASS INDEX: 33.18 KG/M2 | WEIGHT: 245 LBS | HEIGHT: 72 IN

## 2021-06-01 VITALS — BODY MASS INDEX: 35.35 KG/M2 | HEIGHT: 72 IN | WEIGHT: 261 LBS

## 2021-06-02 VITALS — BODY MASS INDEX: 34.13 KG/M2 | WEIGHT: 252 LBS | HEIGHT: 72 IN

## 2021-06-02 VITALS — WEIGHT: 259 LBS | HEIGHT: 72 IN | BODY MASS INDEX: 35.08 KG/M2

## 2021-06-02 NOTE — PROGRESS NOTES
1. Acute left-sided low back pain with left-sided sciatica  predniSONE (DELTASONE) 10 mg tablet    XR Lumbar Spine 2 or 3 VWS    cyclobenzaprine (FLEXERIL) 10 MG tablet   2. Left groin pain  XR Hip Left 2 or More VWS         ASSESSMENT/PLAN:     The following high BMI interventions were performed this visit: exercise promotion: stretching    1. Acute left-sided low back pain with left-sided sciatica    - predniSONE (DELTASONE) 10 mg tablet; Start with 6 tablets today and then taper down by one tablet daily until gone.  Dispense: 21 tablet; Refill: 0  - XR Lumbar Spine 2 or 3 VWS  - cyclobenzaprine (FLEXERIL) 10 MG tablet; Take 1 tablet (10 mg total) by mouth 3 (three) times a day as needed.  Dispense: 15 tablet; Refill: 0    2. Left groin pain    - XR Hip Left 2 or More VWS      There are no Patient Instructions on file for this visit.  There are no discontinued medications.  Return in about 2 weeks (around 10/28/2019), or if symptoms worsen or fail to improve, for back pain.        Imelda Beatty NP          SUBJECTIVE:  Ashutosh Vieira is a 64 y.o. male who presents for evaluation for his low back pain.  Onset: 6 weeks.  Pain occurs in the left SI joint region extends around to the left hip into the groin and down the leg into the foot.  Pain is constant and he describes it as a dull, achy sensation with occasional sharp shooting pains when he turns in certain positions.  Lifting his left leg up to put a shoe on, twisting, getting in and out of his car and standing too long will increase his pain.  He stated he was at a baseball game the other day and standing so long that he felt he was going to pass out the pain was so severe.  He has had no recent falls or trauma, no history of back surgeries or chronic back pain problems.  His weight is unchanged from his last visit.  He has been treating his pain with Advil as needed.  He is planning on golfing today to see how things go.  He confirms that his leg has not  given out on him but it does feel weaker.  He is wearing appropriate tennis shoes today, he has not used any inserts.  He is rating his back pain a 5 out of 10.   Chief Complaint   Patient presents with     Back Pain     Lower LT side, poss siatic nerve pain         Patient Active Problem List   Diagnosis     Male Erectile Disorder Due To Physical Condition     Essential Hypercholesterolemia     Obesity     Obstructive Sleep Apnea     Essential Hypertension     Benign Adenomatous Polyp Of The Large Intestine     Allergic Rhinitis     Diabetes mellitus (H)     Tinnitus     Familial combined hyperlipidemia       Current Outpatient Medications   Medication Sig Dispense Refill     amLODIPine (NORVASC) 10 MG tablet Take 1 tablet (10 mg total) by mouth daily. 90 tablet 1     blood glucose test strips Use 100 each As Directed 2 (two) times a day. 200 strip 5     cyclobenzaprine (FLEXERIL) 10 MG tablet Take 1 tablet (10 mg total) by mouth 3 (three) times a day as needed. 15 tablet 0     glipiZIDE (GLUCOTROL XL) 5 MG 24 hr tablet Take 1 tablet (5 mg total) by mouth daily with breakfast. 90 tablet 1     lancets (ACCU-CHEK MULTICLIX LANCET) Misc Use 100 each As Directed 2 (two) times a day. 200 each 0     losartan (COZAAR) 25 MG tablet Take 1 tablet (25 mg total) by mouth daily. 90 tablet 1     metFORMIN (GLUCOPHAGE) 1000 MG tablet Take 1 tablet (1,000 mg total) by mouth 2 (two) times a day with meals. 180 tablet 1     predniSONE (DELTASONE) 10 mg tablet Start with 6 tablets today and then taper down by one tablet daily until gone. 21 tablet 0     simvastatin (ZOCOR) 40 MG tablet Take 1 tablet (40 mg total) by mouth at bedtime. 90 tablet 1     triamterene-hydrochlorothiazide (DYAZIDE) 37.5-25 mg per capsule Take 1 capsule by mouth daily. 90 capsule 1     No current facility-administered medications for this visit.        Social History     Tobacco Use   Smoking Status Current Some Day Smoker   Smokeless Tobacco Never Used    Tobacco Comment    cigars       REVIEW OF SYSTEMS: Denies  saddle anesthesia, , loss of bowel/bladder control.      TOBACCO USE:  Social History     Tobacco Use   Smoking Status Current Some Day Smoker   Smokeless Tobacco Never Used   Tobacco Comment    cigars     Social History     Socioeconomic History     Marital status:      Spouse name: Isela     Number of children: 2     Years of education: 16     Highest education level: Not on file   Occupational History     Occupation:    Social Needs     Financial resource strain: Not on file     Food insecurity:     Worry: Not on file     Inability: Not on file     Transportation needs:     Medical: Not on file     Non-medical: Not on file   Tobacco Use     Smoking status: Current Some Day Smoker     Smokeless tobacco: Never Used     Tobacco comment: cigars   Substance and Sexual Activity     Alcohol use: Yes     Comment: weekends     Drug use: No     Sexual activity: Yes     Partners: Female   Lifestyle     Physical activity:     Days per week: Not on file     Minutes per session: Not on file     Stress: Not on file   Relationships     Social connections:     Talks on phone: Not on file     Gets together: Not on file     Attends Hindu service: Not on file     Active member of club or organization: Not on file     Attends meetings of clubs or organizations: Not on file     Relationship status: Not on file     Intimate partner violence:     Fear of current or ex partner: Not on file     Emotionally abused: Not on file     Physically abused: Not on file     Forced sexual activity: Not on file   Other Topics Concern     Not on file   Social History Narrative    Travels weekly for work as a .          OBJECTIVE:            Vitals:    10/14/19 1016   BP: 122/76   Pulse: 93   Resp: 16   Temp: (!) 96  F (35.6  C)   SpO2: 98%     Weight: (!) 255 lb (115.7 kg)    Wt Readings from Last 3 Encounters:   10/14/19 (!) 255 lb (115.7 kg)    07/12/19 (!) 255 lb (115.7 kg)   06/11/19 (!) 257 lb (116.6 kg)     Body mass index is 34.58 kg/m .        Physical Exam:  GENERAL APPEARANCE: A&A, NAD, well hydrated, well nourished  CV: RRR, no M/G/R   LUNGS: CTAB, normal respiratory effort  ABDOMEN: S&NT, no masses, no organomegaly, BS present x4   BACK: Increase lower left back pain with palpation on the SI joint, pain is also increased with bending forward, hyperextension, left lateral extension and twisting to the right.  He does have significant left hip pain and groin pain with abduction and abduction along with palpation on the left inner groin.  Not elicit any pain with palpation of the left hip joint.  No clicking or popping noted.  DTRs are intact.  SKIN:  Normal skin turgor, no lesions/rashes, warm and dry

## 2021-06-02 NOTE — TELEPHONE ENCOUNTER
Refill Approved    Rx renewed per Medication Renewal Policy. Medication was last renewed on 12/28/16.    Kimberly Gardner, [facility] Triage/Med Refill 10/11/2019     Requested Prescriptions   Pending Prescriptions Disp Refills     blood glucose test strips 200 strip 5     Sig: Use 100 each As Directed 2 (two) times a day.       Diabetic Supplies Refill Protocol Passed - 10/11/2019 10:28 AM        Passed - Visit with PCP or prescribing provider visit in last 6 months     Last office visit with prescriber/PCP: 7/12/2019 Imelda Beatty NP OR same dept: 7/12/2019 Imelda Beatty NP OR same specialty: 7/12/2019 Imelda Beatty NP  Last physical: Visit date not found Last MTM visit: Visit date not found   Next visit within 3 mo: Visit date not found  Next physical within 3 mo: Visit date not found  Prescriber OR PCP: Imelda Beatty NP  Last diagnosis associated with med order: 1. Diabetes mellitus (H)  - blood glucose test strips; Use 100 each As Directed 2 (two) times a day.  Dispense: 200 strip; Refill: 5    If protocol passes may refill for 12 months if within 3 months of last provider visit (or a total of 15 months).             Passed - A1C in last 6 months     Hemoglobin A1c   Date Value Ref Range Status   07/12/2019 6.9 (H) 3.5 - 6.0 % Final

## 2021-06-03 VITALS
BODY MASS INDEX: 34.54 KG/M2 | DIASTOLIC BLOOD PRESSURE: 76 MMHG | SYSTOLIC BLOOD PRESSURE: 122 MMHG | HEIGHT: 72 IN | HEART RATE: 93 BPM | WEIGHT: 255 LBS | OXYGEN SATURATION: 98 % | RESPIRATION RATE: 16 BRPM | TEMPERATURE: 96 F

## 2021-06-03 VITALS — WEIGHT: 255 LBS | BODY MASS INDEX: 34.54 KG/M2 | HEIGHT: 72 IN

## 2021-06-03 VITALS — HEIGHT: 72 IN | BODY MASS INDEX: 34.81 KG/M2 | WEIGHT: 257 LBS

## 2021-06-03 VITALS
HEART RATE: 75 BPM | OXYGEN SATURATION: 95 % | BODY MASS INDEX: 34.31 KG/M2 | SYSTOLIC BLOOD PRESSURE: 128 MMHG | DIASTOLIC BLOOD PRESSURE: 88 MMHG | WEIGHT: 253 LBS

## 2021-06-03 NOTE — PROGRESS NOTES
Assessment/Plan:      Diagnoses and all orders for this visit:    Hip pain, left  -     MR Hip Without Contrast Left; Future; Expected date: 11/26/2019  -     meloxicam (MOBIC) 15 MG tablet; Take 0.5-1 tablets (7.5-15 mg total) by mouth daily as needed for pain.  Dispense: 30 tablet; Refill: 2    Effusion of left hip  -     MR Hip Without Contrast Left; Future; Expected date: 11/26/2019  -     meloxicam (MOBIC) 15 MG tablet; Take 0.5-1 tablets (7.5-15 mg total) by mouth daily as needed for pain.  Dispense: 30 tablet; Refill: 2    Lumbar spondylosis  -     meloxicam (MOBIC) 15 MG tablet; Take 0.5-1 tablets (7.5-15 mg total) by mouth daily as needed for pain.  Dispense: 30 tablet; Refill: 2        Assessment: Pleasant 64 y.o. male with a history of diabetes mellitus, hyper tension, hyperlipidemia with:    1.  2-month history of left hip pain.  Severe hip pain with decreased range of motion and what appears to be an effusion on coronal imaging of the lumbar spine MRI.    2.  This started with low back pain at the lumbosacral junction on the left consistent with facet arthropathy is severe facet arthropathy bilateral L5-S1 with degenerative disc disease lumbar spondylosis throughout the lumbar spine no high-grade spinal stenosis noted.      Discussion:    1.  I discussed the diagnosis and treatment options.  We discussed options of further imaging, medications, injections, therapy.    2.  Given the severity of the pain of his left hip and likelihood of effusion on MRI of the lumbar spine, recommend MRI of the left hip to further evaluate.  He would like this done at [facility].    3.  Trial meloxicam for pain.    4.  Can consider physical therapy or steroid injection to the left hip under ultrasound based on MRI results.    5.  Follow-up by phone with results of imaging and further recommendations.      It was our pleasure caring for your patient today, if there any questions or concerns please do not hesitate to contact  "us.      Subjective:   Patient ID: Ashutosh Vieira is a 64 y.o. male.    History of Present Illness: Patient presents at the request of Dr. Imelda Beatty for evaluation of low back pain and left leg pain.  This started 2 months ago with some left low back pain.  He has had intermittent back pain in the past with \"twinges \"in the low back that are self-limiting.  These normally occur with bending and lifting.  2 months ago without injury he had a slight twinge in the back.  Then began to develop left lower extremity pain and weakness.  He traveled to [place] and saw his son.  They did a [place]  class along with a lot of walking which has flared his pain.    Pain is now constant.  Worse after prolonged sitting along with sitting with lifting his left leg.  Difficult to lift his left leg to get into the car and walking also causes severe pain.  Gets pain in the groin and shooting down the medial thigh.  Seems to better with sitting for short period.  Weakness in the left hip flexors otherwise his left leg feels stronger no paresthesias.  Has not had physical therapy did have an MRI of the lumbar spine and x-rays of his hip.      Imaging: Plain films of his left hip personally reviewed along with the report.  Unremarkable.  He has some flattening of the femoral head neck junction.    MRI report and images were personally reviewed and discussed with the patient.  A plastic model was utilized during the discussion.  MRI of the lumbar spine personally reviewed  .  This shows multilevel degenerative disc disease with mild broad-based disc bulging.  He has shallow disc bulge L5-S1 with some mild lateral recess stenosis on the left.  Shallow right paracentral disc bulge L4-5.  Facet arthropathy is moderate-severe at L5-S1 on my review.  Most notably there appears to be a left hip joint effusion    Review of Systems: Complains of some weight loss, ringing in his ears, difficulty swallowing change in vision, joint pain " muscle pain muscle fatigue.  No chest pain, shortness of breath, abdominal pain, nausea, vomiting, bowel or bladder incontinence, rashes.  Remainder of 12 point review systems negative unless listed above.    Past Medical History:   Diagnosis Date     Allergic rhinitis      Diabetes mellitus (H)      Erectile dysfunction      Hyperlipemia      Hypertension      Obesity (BMI 35.0-39.9 without comorbidity)      ANA (obstructive sleep apnea)      Plantar fasciitis      Prepatellar bursitis      Tinnitus    Social history: .  Works as a  for the [employer].  Does smoke cigarettes and drinks alcohol.    The following portions of the patient's history were reviewed and updated as appropriate: allergies, current medications, past family history, past medical history, past social history, past surgical history and problem list.      WHO 5: 18    ARY Score: 36      Objective:   Physical Exam:    Vitals:    11/26/19 0759   BP: (!) 146/96   Pulse: 79     Body mass index is 34.58 kg/m .    General:  Well-appearing male in no acute distress.  Overweight, pleasant, cooperative, and interactive throughout the examination and interview.  CV: No lower extremity edema on inspection or paltation.  Lymphatics: No cervical lymphadenopathy palpated. Eyes: sclera clear. Skin: No rashes or lesions seen over the head/neck, hairline, arms, legs, trunk.  Respirations unlabored.  MSK: Gait is normal.  Able to heel-toe walk without difficulty.  Negative Romberg.  Spine: normal AP curves of the C, T, and L spine.  Mild decreased lumbar flexion and finger to floor testing.  Palpation: No significant tenderness lumbar spine or gluteal tissues.  Mild tenderness over the left greater trochanter.  Extremities: Full range of motion of the elbows, and wrists with no effusions or tenderness to palpation.  Significantly decreased range of motion left hip and internal rotation in flexion with severe pain.  Mild pain and Tremayne test.  Full  range of motion of the right hip, bilateral knees, and ankles with no effusions or tenderness to palpation.    No hypermobility of the upper or lower extremities.  Neurologic exam: Mental status: Patient is alert and oriented with normal affect.  Attention, knowledge, memory, and language are intact.  Normal coordination throughout the examination.  Reflexes are 2+ and symmetric biceps, triceps, brachioradialis, patellar, and Achilles with down-going toes and Negative Nicky's.  Sensation is intact to light touch throughout the upper and lower extremities bilaterally.  Manual muscle testing reveals 5 out of 5 in the hip flexors, knee flexors/extensors, ankle plantar flexors, ankle  dorsiflexors, and EHL.  Upper extremities: Grossly normal strength . Normal muscle bulk and tone in the arms and legs.    Negative seated and supine straight leg raise bilaterally for radicular pain.
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
## 2021-06-04 VITALS
SYSTOLIC BLOOD PRESSURE: 124 MMHG | WEIGHT: 256.13 LBS | RESPIRATION RATE: 16 BRPM | DIASTOLIC BLOOD PRESSURE: 84 MMHG | OXYGEN SATURATION: 95 % | BODY MASS INDEX: 34.74 KG/M2 | TEMPERATURE: 97.9 F | HEART RATE: 77 BPM

## 2021-06-04 VITALS
OXYGEN SATURATION: 94 % | BODY MASS INDEX: 34.18 KG/M2 | HEART RATE: 76 BPM | WEIGHT: 252.38 LBS | RESPIRATION RATE: 18 BRPM | TEMPERATURE: 98.3 F | SYSTOLIC BLOOD PRESSURE: 126 MMHG | DIASTOLIC BLOOD PRESSURE: 84 MMHG | HEIGHT: 72 IN

## 2021-06-04 VITALS
WEIGHT: 257.38 LBS | BODY MASS INDEX: 34.86 KG/M2 | OXYGEN SATURATION: 96 % | SYSTOLIC BLOOD PRESSURE: 130 MMHG | HEIGHT: 72 IN | RESPIRATION RATE: 18 BRPM | DIASTOLIC BLOOD PRESSURE: 84 MMHG | HEART RATE: 77 BPM | TEMPERATURE: 97.6 F

## 2021-06-04 VITALS
DIASTOLIC BLOOD PRESSURE: 96 MMHG | HEIGHT: 72 IN | WEIGHT: 255 LBS | SYSTOLIC BLOOD PRESSURE: 146 MMHG | HEART RATE: 79 BPM | BODY MASS INDEX: 34.54 KG/M2

## 2021-06-04 NOTE — TELEPHONE ENCOUNTER
----- Message from Pan Brady DO sent at 12/5/2019  4:38 PM CST -----  MRI of the hip was reviewed.  Findings are consistent with moderately severe left greater than right degenerative changes with some swelling in the left hip joint/hip bone which can be consistent with a stress fracture.    Recommendations: I recommend limited weightbearing and no high impact activities and I would like him to see orthopedics for an evaluation.  I can place a referral to [facility] if he wishes.  We could consider physical therapy or a would be happy to see him for an injection after an orthopedic evaluation, but given the potential stress fracture, I want him to be evaluated by orthopedics.

## 2021-06-04 NOTE — TELEPHONE ENCOUNTER
Pt returned call. Provider's results and recommendations given. Pt stated understanding. Pt would like referral to be sent to [facility]. Will have provider place this order and then will fax order over to [facility].

## 2021-06-05 VITALS
BODY MASS INDEX: 35.09 KG/M2 | OXYGEN SATURATION: 99 % | DIASTOLIC BLOOD PRESSURE: 100 MMHG | WEIGHT: 259.06 LBS | SYSTOLIC BLOOD PRESSURE: 140 MMHG | HEIGHT: 72 IN | HEART RATE: 94 BPM

## 2021-06-05 NOTE — PROGRESS NOTES
1. Diabetes mellitus (H)  Glycosylated Hemoglobin A1c    Microalbumin, Random Urine    metFORMIN (GLUCOPHAGE) 1000 MG tablet    glipiZIDE (GLUCOTROL XL) 5 MG 24 hr tablet   2. Essential hypertension  Basic Metabolic Panel    triamterene-hydrochlorothiazide (DYAZIDE) 37.5-25 mg per capsule    losartan (COZAAR) 25 MG tablet    amLODIPine (NORVASC) 10 MG tablet   3. Essential Hypercholesterolemia  Lipid Cascade FASTING    simvastatin (ZOCOR) 40 MG tablet   4. Obstructive Sleep Apnea     5. Hip pain, left           ASSESSMENT/PLAN:     The following high BMI interventions were performed this visit: encouragement to exercise, weight monitoring, exercise promotion: strength training and exercise promotion: stretching    1. Diabetes mellitus (H)    - Glycosylated Hemoglobin A1c  - Microalbumin, Random Urine  - metFORMIN (GLUCOPHAGE) 1000 MG tablet; Take 1 tablet (1,000 mg total) by mouth 2 (two) times a day with meals.  Dispense: 180 tablet; Refill: 1  - glipiZIDE (GLUCOTROL XL) 5 MG 24 hr tablet; Take 1 tablet (5 mg total) by mouth daily with breakfast.  Dispense: 90 tablet; Refill: 1  -He is very motivated to get his diabetes back under control by diet and exercise, weight loss.  He is planning on looking into seeing if his insurance covers diabetes education and he is interested in meeting with a diabetes educator to go over his plan to get his diabetes under control.  He is planning on getting back on track with checking his blood sugars every morning as well.  This is something that he had fallen off of doing previously.  -We discussed the importance of limiting sweets and alcohol.  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  He does travel weekly for work so he is around fast food quite a bit.   -We will continue with biking for exercise, he is currently using the [employer] program    2. Essential hypertension    - Basic Metabolic Panel  -  triamterene-hydrochlorothiazide (DYAZIDE) 37.5-25 mg per capsule; Take 1 capsule by mouth daily.  Dispense: 90 capsule; Refill: 1  - losartan (COZAAR) 25 MG tablet; Take 1 tablet (25 mg total) by mouth daily.  Dispense: 90 tablet; Refill: 1  - amLODIPine (NORVASC) 10 MG tablet; Take 1 tablet (10 mg total) by mouth daily.  Dispense: 90 tablet; Refill: 1  -We discussed the importance of limiting sweets and alcohol.  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  He does travel weekly for work so he is around fast food quite a bit.   -We will continue with biking for exercise, he is currently using the [employer] program        3. Essential Hypercholesterolemia    - Lipid Cascade FASTING  - simvastatin (ZOCOR) 40 MG tablet; Take 1 tablet (40 mg total) by mouth at bedtime.  Dispense: 90 tablet; Refill: 1  The 10-year ASCVD risk score (Jenny DC Jr., et al., 2013) is: 16.3%    Values used to calculate the score:      Age: 64 years      Sex: Male      Is Non- : No      Diabetic: No      Tobacco smoker: Yes      Systolic Blood Pressure: 126 mmHg      Is BP treated: Yes      HDL Cholesterol: 41 mg/dL      Total Cholesterol: 161 mg/dL    4. Obstructive Sleep Apnea    -He is up-to-date with his sleep study follow-up, he recently got new equipment and has been using it every night    5. Hip pain, left    -His hip pain is greatly improved since his injection in December.  The only time he really notices pain is when he goes from sitting to standing.  He will plan on following up with [facility] if he runs into any problems with his left hip pain recurring.      There are no Patient Instructions on file for this visit.  Medications Discontinued During This Encounter   Medication Reason     meloxicam (MOBIC) 15 MG tablet Therapy completed     cyclobenzaprine (FLEXERIL) 10 MG tablet Therapy completed     gabapentin (NEURONTIN) 300  "MG capsule Therapy completed     triamterene-hydrochlorothiazide (DYAZIDE) 37.5-25 mg per capsule Reorder     simvastatin (ZOCOR) 40 MG tablet Reorder     metFORMIN (GLUCOPHAGE) 1000 MG tablet Reorder     losartan (COZAAR) 25 MG tablet Reorder     glipiZIDE (GLUCOTROL XL) 5 MG 24 hr tablet Reorder     amLODIPine (NORVASC) 10 MG tablet Reorder     Return in about 6 months (around 7/9/2020) for diabetes, hypertension, hyperlipidemia, annual physical, back pain.         Imelda Beatty NP          SUBJECTIVE:  Ashutosh Vieira is a 64 y.o. male who presents for med check today    Hypertension/hyperlipidemia: Well-controlled on current medications which include amlodipine, losartan and triamterene with hydrochlorthiazide.  He is not taking a daily baby aspirin due to having recent issues with nosebleeds.  He continues to take a statin.    He recently started exercising again using a "[employer] biking program, his goal is to complete 100 rides by April.  He has lost 2 pounds since his last office visit.  Current BMI 34.2. He is planning to \"get back on track and lose some weight \". He does occasionally smoke cigars, alcohol use is socially but he denies any illicit drug use.  No history of coronary artery disease.        Type II  diabetes non-insulin-dependent: Current medications include glipizide 5 mg daily and metformin 1000 mg twice daily.  He was previously taking a higher dose of glipizide but when he did his last weight loss challenge with work, he was able to wean down on some of his glipizide medication due the weight loss he had during the contest.  Since he has put the weight back on, his A1c has steadily climbed back up towards the 7.5 range. We did review his diet.  He typically skips breakfast and drinks 20 ounces of black coffee.  Lunch is usually around 11 AM and he typically always eats out.  Him and his wife do eat out majority of the time for dinner as well.  Snacks include cereal, chips or ice cream.  " "He does not drink soda, he does drink adequate amounts of water daily.    He did bring me a printout of his recent glucose levels starting from December 26 through January 9.  His fasting sugars have been running anywhere from 1 54-2 04.  His p.m. sugars have been ranging anywhere from 113-276.  The night his blood sugar was 276, he states he had eaten several cookies prior to checking his sugar.  Since January 1, he has been making modifications to his diet and he is been able to see on paper the decrease in his glucose numbers which has only motivated him more to clean up his diet, lose some weight and his ultimate goal is to get off or reduce more of his diabetes medication. He denies any urinary changes.  No numbness or tingling in the feet, he does perform his own nail care.  He is overdue for his vision testing and has been so for over 2 years now, he denies vision changes today.        Sleep apnea: evaluated by sleep medicine in the last 6 months, he did upgrade his CPAP equipment, and is using a nasal method of oxygen delivery now.  During 1 of his trips to [place], he had forgotten his CPAP machine and he could tell a significant difference in the amount of energy he had the next day and how poor his sleep was the night before when he had to sleep without his machine.  He states \"having a CPAP machine is the best thing that ever happened to me, I feel rested all of the time and I get the best sleep of my life now\".    Left hip pain: He recently received a cortisone injection into the left hip due to the amount of low back pain and left hip pain he was experiencing.  Since his hip injection, he will continue to experience some pain in the hip that he describes as a \"twinge \".  They did remove some fluid from the hip prior to doing his injection.  He does notice that the pain occurs when he twists or turns too fast.  He also notices pain when he goes from sitting to standing.  He is rating his pain in the hip " today a 5 out of 10.  He is no longer taking any of the muscle relaxer therapy medication or the gabapentin.  He will occasionally take a Tylenol if he needs to.  He was instructed to follow-up with [facility] on an as-needed basis if his pain recurs        Chief Complaint   Patient presents with     Follow Up     Med Check -         Patient Active Problem List   Diagnosis     Male Erectile Disorder Due To Physical Condition     Essential Hypercholesterolemia     Obesity     Obstructive Sleep Apnea     Essential Hypertension     Benign Adenomatous Polyp Of The Large Intestine     Allergic Rhinitis     Diabetes mellitus (H)     Tinnitus     Familial combined hyperlipidemia     Hip pain, left       Current Outpatient Medications   Medication Sig Dispense Refill     amLODIPine (NORVASC) 10 MG tablet Take 1 tablet (10 mg total) by mouth daily. 90 tablet 1     blood glucose test strips Use 100 each As Directed 2 (two) times a day. 200 strip 5     glipiZIDE (GLUCOTROL XL) 5 MG 24 hr tablet Take 1 tablet (5 mg total) by mouth daily with breakfast. 90 tablet 1     lancets (ACCU-CHEK MULTICLIX LANCET) Misc Use 100 each As Directed 2 (two) times a day. 200 each 0     metFORMIN (GLUCOPHAGE) 1000 MG tablet Take 1 tablet (1,000 mg total) by mouth 2 (two) times a day with meals. 180 tablet 1     simvastatin (ZOCOR) 40 MG tablet Take 1 tablet (40 mg total) by mouth at bedtime. 90 tablet 1     triamterene-hydrochlorothiazide (DYAZIDE) 37.5-25 mg per capsule Take 1 capsule by mouth daily. 90 capsule 1     losartan (COZAAR) 25 MG tablet Take 1 tablet (25 mg total) by mouth daily. 90 tablet 1     No current facility-administered medications for this visit.        Social History     Tobacco Use   Smoking Status Current Some Day Smoker   Smokeless Tobacco Never Used   Tobacco Comment    cigars       REVIEW OF SYSTEMS: Denies radiation, diaphoresis, shortness of breath, dizziness, syncope, nausea, palpitations, and associated  with activity.       TOBACCO USE:  Social History     Tobacco Use   Smoking Status Current Some Day Smoker   Smokeless Tobacco Never Used   Tobacco Comment    cigars     Social History     Socioeconomic History     Marital status:      Spouse name: Isela     Number of children: 2     Years of education: 16     Highest education level: Not on file   Occupational History     Occupation:    Social Needs     Financial resource strain: Not on file     Food insecurity:     Worry: Not on file     Inability: Not on file     Transportation needs:     Medical: Not on file     Non-medical: Not on file   Tobacco Use     Smoking status: Current Some Day Smoker     Smokeless tobacco: Never Used     Tobacco comment: cigars   Substance and Sexual Activity     Alcohol use: Yes     Comment: weekends     Drug use: No     Sexual activity: Yes     Partners: Female   Lifestyle     Physical activity:     Days per week: Not on file     Minutes per session: Not on file     Stress: Not on file   Relationships     Social connections:     Talks on phone: Not on file     Gets together: Not on file     Attends Yarsani service: Not on file     Active member of club or organization: Not on file     Attends meetings of clubs or organizations: Not on file     Relationship status: Not on file     Intimate partner violence:     Fear of current or ex partner: Not on file     Emotionally abused: Not on file     Physically abused: Not on file     Forced sexual activity: Not on file   Other Topics Concern     Not on file   Social History Narrative    Travels weekly for work as a .          OBJECTIVE:            Vitals:    01/09/20 1536   BP: 126/84   Pulse: 76   Resp: 18   Temp: 98.3  F (36.8  C)   SpO2: 94%     Weight: (!) 252 lb 6 oz (114.5 kg)    Wt Readings from Last 3 Encounters:   01/09/20 (!) 252 lb 6 oz (114.5 kg)   11/26/19 (!) 255 lb (115.7 kg)   11/07/19 (!) 253 lb (114.8 kg)     Body mass index is 34.23  kg/m .        Physical Exam:  GENERAL APPEARANCE: A&A, NAD, well hydrated, well nourished  HEAD: atraumatic, no deformity  EYES: PERRL, EOM's intact, no redness or swelling  MOUTH: without erythema, exudate or thrush  NECK: Supple, without lymphadenopathy, no thyroid mass, no JVD, no bruit  CV: RRR, no M/G/R, no LE edema   LUNGS: CTAB, normal respiratory effort  ABDOMEN: S&NT, no masses, no organomegaly, BS present x4   EXTREMITY: Feet inspected, patient has full sensation per monofilament testing today.  He does have small calluses just below the great toes, otherwise skin is dry.  No signs of open wounds.  Nails are in good health.  SKIN:  Normal skin turgor, no lesions/rashes, pink, warm and dry

## 2021-06-06 ENCOUNTER — HEALTH MAINTENANCE LETTER (OUTPATIENT)
Age: 66
End: 2021-06-06

## 2021-06-08 NOTE — TELEPHONE ENCOUNTER
Received a fax from [email or web address].    Fax is in Imelda's inbox.    6/16/2020

## 2021-06-09 NOTE — TELEPHONE ENCOUNTER
Patient Returning Call  Reason for call:  Patient called back.  Information relayed to patient:  Informed of message in My Chart.  Patient states understanding and will make an appointment.  Patient has additional questions:  No  If YES, what are your questions/concerns:    Okay to leave a detailed message?: No call back needed

## 2021-06-09 NOTE — PROGRESS NOTES
Chief Complaint   Annual Exam           HPI: Patient presents for annual exam.  His diabetes remains stable on oral medications with no hypoglycemic episodes.  His weight remains unchanged and he is aware he needs to lose weight.  His biggest issue is left hip pain.  MRI from November 2019 was reviewed showing severe degenerative changes.  He seen orthopedics in the past with no resolution.  He has ANA remained stable.  Socially does not smoke.  He works for a [employer] company and he continues to work through the coronavirus crisis.    ROS: No chest pain shortness of breath or edema or hypoglycemia    SH: Reviewed-see Snapshot for review.     FH: Reviewed-see Snapshot for review.    Meds:  Ashutosh has a current medication list which includes the following prescription(s): blood glucose test, generic lancets, amlodipine, glipizide, losartan, metformin, simvastatin, and triamterene-hydrochlorothiazide.    O:  /84   Pulse 77   Temp 97.9  F (36.6  C)   Resp 16   Wt (!) 256 lb 2 oz (116.2 kg)   SpO2 95%   BMI 34.74 kg/m    Constitutional:    --Vitals as above  --No acute distress  Eyes-  --Sclera noninjected  --Lids and conjunctiva normal  Neck-  --Neck supple    Lymph-  --No cervical lymphadenopathy  Lungs-  --Clear to Auscultation  Heart-  --Regular rate and rhythm  Skin-  --Pink and dry    A/P:   1. Wellness examination  Encourage weight loss, exercise as able.  - HM2(CBC w/o Differential)  - Basic Metabolic Panel  - Lipid Cascade  - PSA (Prostatic-Specific Antigen), Annual Screen    2. Essential Hypercholesterolemia  Continue statin    3. Type 2 diabetes mellitus without complication, without long-term current use of insulin (H)  Continue metformin and glipizide  - Glycosylated Hemoglobin A1c    4.  Hip pain  -Encouraged follow-up with [facility]    RTC 6 months

## 2021-06-11 NOTE — PROGRESS NOTES
[facility]  [address] PROF RADHA  [facility] [address]  Dept: [phone]  Dept Fax: [phone]  Primary Provider: Imelda Beatty NP  Pre-op Performing Provider: IMELDA BEATTY    PREOPERATIVE EVALUATION:  Today's date: 9/10/2020    Ashutosh Vieira is a 65 y.o. male who presents for a preoperative evaluation for left hip replacement surgery.  Patient recently completed an evaluation at [facility] the [facility] for his ongoing left hip osteoarthritis pain.  He was found to have exhausted all nonoperative treatments including therapy, injections and anti-inflammatories.  He does have a grade 3-4 arthritis of the left hip with functional limitations and loss of activities of daily living.  He continues to have pain in the left hip and groin that is constant.  He describes it as a dull ache with occasional sharp pains.  Standing too long and rolling over in bed do tend aggravate his discomfort.  He has been taking Tylenol close to the maximum dose daily and has developed some GI upset with this.  He finds that elevating the extremity and resting help to improve his discomfort.  He is rating his pain a 7 out of 10 today.  He has been sent prescriptions for all of his postop pain medication, he has a great understanding of how to proceed with dosages.  He is a type II diabetic non-insulin-dependent, he will plan on holding his morning glipizide the day of surgery as it is a fast acting diabetic medication.  He would like his pneumonia vaccination today, he is planning on getting his flu shot in October.  He does have sleep apnea but he does wear his CPAP every at bedtime.    Surgical Information:  Surgery Details 9/8/2020   Surgery/Procedure: Total Hip Arthroplasty   Surgery [facility]   Surgeon: Dr. Londono   Surgery Date: 09/21/20   Time of Surgery: 0915-AM   Where patient plans to recover: at home with  family     Fax number for surgical facility: [facility]  Type of Anesthesia Anticipated: to be determined    Subjective     HPI related to upcoming procedure:   Preop Questions 9/8/2020   Have you ever had a heart attack or stroke? No   Have you ever had surgery on your heart or blood vessels, such as a stent placement, a coronary artery bypass, or surgery on an artery in your head, neck, heart, or legs? No   Do you have chest pain with activity? No   Do you have a history of  heart failure? No   Do you currently have a cold, bronchitis or symptoms of other infection? No   Do you have a cough, shortness of breath, or wheezing? No   Do you or anyone in your family have previous history of blood clots? No   Do you or does anyone in your family have a serious bleeding problem such as prolonged bleeding following surgeries or cuts? No   Have you ever had problems with anemia or been told to take iron pills? No   Have you had any abnormal blood loss such as black, tarry or bloody stools? No   Have you ever had a blood transfusion? No   Are you willing to have a blood transfusion if it is medically needed before, during, or after your surgery? Yes   Have you or any of your relatives ever had problems with anesthesia? No   Do you have sleep apnea, excessive snoring or daytime drowsiness? YES - wears CPAP every HS   Do you have a CPAP machine? Yes   Do you have any artifical heart valves or other implanted medical devices like a pacemaker, defibrillator, or continuous glucose monitor? No   Do you have artificial joints? No   Are you allergic to latex? No         Patient does not have a Health Care Directive or Living Will: Discussed advance care planning with patient; however, patient declined at this time.    RX [place] ([place] reviewed:Oxycodone prescribed through [facility], new prescription, no chronic use  :[id number]}      Review of Systems  Constitutional, neuro, ENT, endocrine, pulmonary,  cardiac, gastrointestinal, genitourinary, musculoskeletal, integument and psychiatric systems are negative, except as otherwise noted.      Patient Active Problem List    Diagnosis Date Noted     Hip pain, left 01/09/2020     Familial combined hyperlipidemia 01/26/2018     Tinnitus 12/28/2016     Diabetes mellitus (H) 05/29/2015     Allergic Rhinitis      Male Erectile Disorder Due To Physical Condition      Essential Hypercholesterolemia      Obesity      Obstructive Sleep Apnea      Essential Hypertension      Benign Adenomatous Polyp Of The Large Intestine      Past Medical History:   Diagnosis Date     Allergic rhinitis      Diabetes mellitus (H)      Erectile dysfunction      Hyperlipemia      Hypertension      Obesity (BMI 35.0-39.9 without comorbidity)      ANA (obstructive sleep apnea)      Plantar fasciitis      Prepatellar bursitis      Tinnitus      No past surgical history on file.  Current Outpatient Medications   Medication Sig Dispense Refill     amLODIPine (NORVASC) 10 MG tablet Take 1 tablet (10 mg total) by mouth daily. 90 tablet 1     blood glucose test strips Use 100 each As Directed 2 (two) times a day. 200 strip 5     glipiZIDE (GLUCOTROL XL) 5 MG 24 hr tablet Take 1 tablet (5 mg total) by mouth daily with breakfast. 90 tablet 1     lancets (ACCU-CHEK MULTICLIX LANCET) Misc Use 100 each As Directed 2 (two) times a day. 200 each 0     losartan (COZAAR) 25 MG tablet Take 1 tablet (25 mg total) by mouth daily. 90 tablet 1     metFORMIN (GLUCOPHAGE) 1000 MG tablet Take 1 tablet (1,000 mg total) by mouth 2 (two) times a day with meals. 180 tablet 1     simvastatin (ZOCOR) 40 MG tablet Take 1 tablet (40 mg total) by mouth at bedtime. 90 tablet 1     triamterene-hydrochlorothiazide (DYAZIDE) 37.5-25 mg per capsule Take 1 capsule by mouth daily. 90 capsule 1     cephalexin (KEFLEX) 500 MG capsule TAKE 1 CAPSULE BY MOUTH EVERY 6 HOURS UNTIL GONE       HUMALOG 100 unit/mL injection USE AS DIRECTED THREE  TIMES DAILY WITH MEALS AND AT BEDTIME. MAX DAILY DOSE IS 42 UNITS       hydrOXYzine HCL (ATARAX) 25 MG tablet TAKE 1 TABLET BY MOUTH 4 TIMES DAILY AS NEEDED       ketorolac (TORADOL) 10 mg tablet TAKE 1 TABLET BY MOUTH 4 TIMES DAILY AS NEEDED       mupirocin (BACTROBAN) 2 % ointment SWAB NARES TWICE DAILY BEGIN 5 DAYS BEFORE SURGERY       omeprazole (PRILOSEC) 20 MG capsule Take 1 capsule (20 mg total) by mouth daily before breakfast. 90 capsule 2     ondansetron (ZOFRAN-ODT) 4 MG disintegrating tablet DISSOLVE 1 TABLET IN MOUTH 4 TIMES DAILY AS NEEDED FOR NAUSEA       oxyCODONE (ROXICODONE) 5 MG immediate release tablet TAKE 1 TO 2 TABLETS BY MOUTH EVERY 4 HOURS AS NEEDED FOR PAIN . DO NOT EXCEED 6 PER 24 HOURS       No current facility-administered medications for this visit.        Allergies   Allergen Reactions     Lisinopril Cough       Social History     Tobacco Use     Smoking status: Current Some Day Smoker     Smokeless tobacco: Never Used     Tobacco comment: cigars   Substance Use Topics     Alcohol use: Yes     Comment: weekends      Family History   Problem Relation Age of Onset     Melanoma Mother      No Medical Problems Brother      Social History     Substance and Sexual Activity   Drug Use No           Objective   /84   Pulse 77   Temp 97.6  F (36.4  C)   Resp 18   Ht 6' (1.829 m)   Wt (!) 257 lb 6 oz (116.7 kg)   SpO2 96%   BMI 34.91 kg/m    Physical Exam    GENERAL APPEARANCE: healthy, alert and no distress     EYES: EOMI,  PERRL     HENT: ear canals and TM's normal and nose and mouth without ulcers or lesions     NECK: no adenopathy, no asymmetry, masses, or scars and thyroid normal to palpation     RESP: lungs clear to auscultation - no rales, rhonchi or wheezes     CV: regular rates and rhythm, normal S1 S2, no S3 or S4 and no murmur, click or rub     ABDOMEN:  soft, nontender, no HSM or masses and bowel sounds normal     MS: extremities normal- no gross deformities noted,  increased left hip and left groin pain with external rotation, abduction, adduction and with direct palpation of the left hip joint     SKIN: no suspicious lesions or rashes     NEURO: Normal strength and tone, sensory exam grossly normal, mentation intact and speech normal     PSYCH: mentation appears normal. and affect normal/bright     LYMPHATICS: No cervical adenopathy    Recent Labs   Lab Test 09/10/20  1037 06/30/20  0845   HGB 15.5 15.3    269    140   K 4.1 3.9   CREATININE 0.84 0.85        PRE-OP Diagnostics:   Labs pending at this time. Results will be reviewed when available.  EKG: NSR with 1st degree AV block, personally reviewed, final read completed by [facility]         Assessment & Plan       The proposed surgical procedure is considered INTERMEDIATE risk.    REVISED CARDIAC RISK INDEX   The patient has the following serious cardiovascular risks for perioperative complications:  No serious cardiac risks = 0 points    INTERPRETATION: 0 points: Class I (very low risk - 0.4% complication rate)    1. Pre-operative examination    - Basic Metabolic Panel  - HM2(CBC w/o Differential)  - Electrocardiogram Perform and Read    2. Primary osteoarthritis of left hip    - Basic Metabolic Panel  - HM2(CBC w/o Differential)  - Electrocardiogram Perform and Read    3. Gastroesophageal reflux disease without esophagitis    - omeprazole (PRILOSEC) 20 MG capsule; Take 1 capsule (20 mg total) by mouth daily before breakfast.  Dispense: 90 capsule; Refill: 2    4. Need for vaccination    - Pneumococcal polysaccharide vaccine 23-valent 3 yo or older, subq/IM  - Influenza,Quad,High Dose,PF 65 YR+      The patient has the following additional risks and recommendations for perioperative complications:     - No identified additional risk factors other than previously addressed     MEDICATION INSTRUCTIONS: Hold Glipizide medication to avoid low BG level, fast acting med otherwise okay to take all prescribed medications  as discussed      RECOMMENDATION:  APPROVAL GIVEN to proceed with proposed procedure, without further diagnostic evaluation.    Return in about 3 months (around 12/10/2020) for diabetes, hypertension, hyperlipidemia.    Signed Electronically by: Imelda Beatty NP    Copy of this evaluation report is provided to requesting physician.    [facility] Preop Guidelines    Revised Cardiac Risk Index

## 2021-06-11 NOTE — PROGRESS NOTES
1. Diabetes mellitus  Glycosylated Hemoglobin A1c    Microalbumin, Random Urine    glipiZIDE (GLUCOTROL XL) 10 MG 24 hr tablet   2. Essential Hypertension  amLODIPine (NORVASC) 10 MG tablet    losartan (COZAAR) 25 MG tablet    triamterene-hydrochlorothiazide (DYAZIDE) 37.5-25 mg per capsule   3. Essential hypertension with goal blood pressure less than 130/80     4. Type 2 diabetes mellitus  metFORMIN (GLUCOPHAGE) 500 MG tablet    DISCONTINUED: glipiZIDE (GLUCOTROL XL) 5 MG 24 hr tablet   5. Familial combined hyperlipidemia  simvastatin (ZOCOR) 40 MG tablet   6. Essential Hypercholesterolemia     7. Obesity     8. Tobacco abuse         ASSESSMENT/PLAN:     I have had an Advance Directives discussion with the patient.  The following high BMI interventions were performed this visit: encouragement to exercise, weight monitoring, exercise promotion: strength training, exercise promotion: stretching and special diet education  I have counseled the patient for tobacco cessation and the follow up will occur  at the next visit.    Upon reviewing patient's blood glucose results from June 20 to July 13th, Glipzide increased from 5 mg daily to 10 mg daily. Patient's glucose levels in the a.m range between 130-180 with p.m glucose levels ranging between 160-290. If blood glucose levels are not improved in the next 3 months, patient will be sent to meet with diabetes educator to determine where his difficulty with managing his diabetes occurs. Patient instructed to continuing wearing his compression stocking for her LE swelling. If there is not improvement with his swelling, may need to consider switching his Norvasc to an alternative BP medication. Blood pressure is currently controlled on current therapy. Continue to monitor cyst on the upper back. If it begins to give him trouble, may need to have it incised and drained. Patient is up to date with eye exam (May 2017). Next colonoscopy is due in 2022. Colonoscopy in 2017  showed intestinal polyps that were benign. Discussed BMI and the need for exercise while he is traveling for work. 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        The visit lasted a total of 30 minutes face to face with the patient.  Over 50% of the time spent counseling and educating the patient about that above content.              SUBJECTIVE:  Ashutosh Vieira is a 62 y.o. male who presents for follow up of his diabetes and refill of prescribed medications. Patient is a type 2 diabetic who is currently taking 2 different oral medications to manage his glucose levels. He says his diet has been poor because he travels for work almost weekly which results in him eating out daily. He does not exercise and he continues to intermittently smoke cigars. His blood sugars have not been controlled. He checks them twice per day. His morning sugars range between 130-180 with night time glucose levels being 160-290. He does have sleep apnea and has been having some issues with increased swelling of his bilateral LE. He does wear compression stocking for this. Patient mentioned that he does have a cyst on his back that he would like me to evaluate today. He is due for his next colonoscopy in 2022. Patient is also over due for his annual physical with fasting lab work. Immunizations are up to date.   Chief Complaint   Patient presents with     Follow-up     DM / Med Check -      Establish Care         Patient Active Problem List   Diagnosis     Male Erectile Disorder Due To Physical Condition     Plantar Fasciitis     Prepatellar Bursitis     Essential Hypercholesterolemia     Obesity     Obstructive Sleep Apnea     Essential Hypertension     Benign Adenomatous Polyp Of The Large Intestine     Allergic Rhinitis     Diabetes mellitus     Tinnitus       Current Outpatient Prescriptions    Medication Sig Dispense Refill     amLODIPine (NORVASC) 10 MG tablet Take 1 tablet (10 mg total) by mouth daily. 90 tablet 1     blood glucose test strips Use 100 each As Directed 2 (two) times a day. 200 strip 5     lancets (ACCU-CHEK MULTICLIX LANCET) Misc Use 100 each As Directed 2 (two) times a day. 200 each 0     losartan (COZAAR) 25 MG tablet Take 1 tablet (25 mg total) by mouth daily. 90 tablet 1     metFORMIN (GLUCOPHAGE) 500 MG tablet Take 1 tablet (500 mg total) by mouth 2 (two) times a day with meals. 180 tablet 1     simvastatin (ZOCOR) 40 MG tablet Take 1 tablet (40 mg total) by mouth at bedtime. 90 tablet 1     triamterene-hydrochlorothiazide (DYAZIDE) 37.5-25 mg per capsule Take 1 capsule by mouth daily. 90 capsule 1     glipiZIDE (GLUCOTROL XL) 10 MG 24 hr tablet Take 1 tablet (10 mg total) by mouth daily. 90 tablet 1     loratadine (CLARITIN) 10 mg tablet Take 10 mg by mouth daily.       No current facility-administered medications for this visit.        History   Smoking Status     Current Some Day Smoker   Smokeless Tobacco     Never Used     Comment: cigars       REVIEW OF SYSTEMS:  Patient denies fever, chills, dizziness, headache, visual change, cough, chest pain, shortness of breath, abdominal pain, numbness or tingling of the extremities.       TOBACCO USE:  History   Smoking Status     Current Some Day Smoker   Smokeless Tobacco     Never Used     Comment: cigars     Social History     Social History     Marital status:      Spouse name: N/A     Number of children: N/A     Years of education: N/A     Occupational History     Not on file.     Social History Main Topics     Smoking status: Current Some Day Smoker     Smokeless tobacco: Never Used      Comment: cigars     Alcohol use Yes      Comment: weekends     Drug use: No     Sexual activity: Yes     Partners: Female     Other Topics Concern     Not on file     Social History Narrative         OBJECTIVE:            Vitals:     07/13/17 1335   BP: 122/86   Pulse: 70   Resp: 16   SpO2: 96%     Weight: 261 lb (118.4 kg)  Wt Readings from Last 3 Encounters:   07/13/17 (!) 261 lb (118.4 kg)   12/28/16 (!) 263 lb 1.6 oz (119.3 kg)   10/24/16 (!) 263 lb 11.2 oz (119.6 kg)     Body mass index is 35.4 kg/(m^2).        Physical Exam:  GENERAL APPEARANCE: A&A, NAD, well hydrated, well nourished  SKIN:  No lesions/rashes, or ulcers. Sebaceous cyst noted in middle of upper back 1.5 x 1.5 cm.   EYES: Red reflex present, EOM's intact, pupils equal, round, reactive to light, normal conjuctiva  OROPHARYNX: without erythema, no post nasal drainage or thrush  NECK: Supple, without lymphadenopathy, no thyroid mass, no JVD, full ROM  CV: RRR, no M/G/R   LUNGS: CTAB, normal respiratory effort  ABDOMEN: S&NT, no masses, no organomegaly, BS present in all quadrants   EXTREMITY: 2+ LE edema bilaterally, feet without ulcers, nails without discoloration, skin intact, full sensation  NEURO: no gross deficits, DTR's intact      Imelda Beatty NP

## 2021-06-12 NOTE — TELEPHONE ENCOUNTER
Refill Approved    Rx renewed per Medication Renewal Policy. Medication was last renewed on 10/1/19.    Kimberly Gardner, [facility] Triage/Med Refill 11/2/2020     Requested Prescriptions   Pending Prescriptions Disp Refills     ACCU-CHEK SMARTVIEW TEST STRIP strips [Pharmacy Med Name: Accu-Chek SmartView In Vitro Strip]  0     Sig: USE 1 STRIP TO CHECK GLUCOSE TWICE DAILY       Diabetic Supplies Refill Protocol Passed - 11/1/2020  1:07 PM        Passed - Visit with PCP or prescribing provider visit in last 6 months     Last office visit with prescriber/PCP: 1/9/2020 Imelda Beatty NP OR same dept: 6/30/2020 Mirta Hassan MD OR same specialty: 6/30/2020 Mirta Hassan MD  Last physical: 9/10/2020 Last MTM visit: Visit date not found   Next visit within 3 mo: Visit date not found  Next physical within 3 mo: Visit date not found  Prescriber OR PCP: Imelda Beatty NP  Last diagnosis associated with med order: 1. Diabetes mellitus (H)  - ACCU-CHEK SMARTVIEW TEST STRIP strips [Pharmacy Med Name: Accu-Chek SmartView In Vitro Strip]; USE 1 STRIP TO CHECK GLUCOSE TWICE DAILY; Refill: 0    If protocol passes may refill for 12 months if within 3 months of last provider visit (or a total of 15 months).             Passed - A1C in last 6 months     Hemoglobin A1c   Date Value Ref Range Status   06/30/2020 6.7 (H) 3.5 - 6.0 % Final

## 2021-06-14 NOTE — PROGRESS NOTES
1. Type 2 diabetes mellitus without complication, without long-term current use of insulin (H)  Glycosylated Hemoglobin A1c    Microalbumin, Random Urine    Vitamin B12    metFORMIN (GLUCOPHAGE) 1000 MG tablet   2. Essential hypertension  Basic Metabolic Panel    triamterene-hydrochlorothiazide (DYAZIDE) 37.5-25 mg per capsule    losartan (COZAAR) 25 MG tablet    amLODIPine (NORVASC) 10 MG tablet   3. Familial combined hyperlipidemia  Lipid C[place] FASTING   4. Obstructive Sleep Apnea     5. Encounter for hepatitis C screening test for low risk patient  Hepatitis C Antibody (Anti-HCV)   6. Acute midline low back pain without sciatica     7. Sebaceous cyst     8. Cough     9. Obesity (BMI 35.0-39.9) with comorbidity (H)           ASSESSMENT/PLAN:     The following high BMI interventions were performed this visit: encouragement to exercise, weight monitoring, exercise promotion: strength training, exercise promotion: stretching and exercise promotion: walking/step counting    1. Type 2 diabetes mellitus without complication, without long-term current use of insulin (H)    - Glycosylated Hemoglobin A1c  - Microalbumin, Random Urine  - Vitamin B12  - metFORMIN (GLUCOPHAGE) 1000 MG tablet; Take 1 tablet (1,000 mg total) by mouth 2 (two) times a day with meals.  Dispense: 180 tablet; Refill: 1  We discussed having him work on eating smaller meals more frequently throughout the day to assist with better glucose control  Reviewed patient's glucose log with him today  Continue to exercise regularly and work on weight loss    2. Essential hypertension    - Basic Metabolic Panel  - triamterene-hydrochlorothiazide (DYAZIDE) 37.5-25 mg per capsule; Take 1 capsule by mouth daily.  Dispense: 90 capsule; Refill: 1  - losartan (COZAAR) 25 MG tablet; Take 1 tablet (25 mg total) by mouth daily.  Dispense: 90 tablet; Refill: 1  - amLODIPine (NORVASC) 10 MG tablet; Take 1 tablet (10 mg total) by mouth daily.  Dispense: 90 tablet;  Refill: 1  He will send me some BP readings next week through My Chart and if they are still elevated at that time, will increase Losartan dose to 50 mg daily    3. Familial combined hyperlipidemia    - Lipid [place] FASTING  On statin therapy    4. Obstructive Sleep Apnea    Last sleep medicine encounter was 11/2020, he has received new equipment and is sleeping wonderful, he feels rested upon waking    5. Encounter for hepatitis C screening test for low risk patient    - Hepatitis C Antibody (Anti-HCV)    6. Acute midline low back pain without sciatica    He did removed the mattress pad from his bed which has helped some  We reviewed the results of his lumbar MRI from 2019 which showed mild disc protrusion/bulding in the L4- S1 region  Continue to use Tylenol PRN  List of back exercises given to patient to perform at home  Work on core strengthening exercises at home    7. Sebaceous cyst    Cyst incised and drained today in clinic  Patient tolerated procedure well  Discussed aftercare    8. Cough    COVID test negative  He worries about mark this and has many concerns which we discussed today regarding the vaccines  He has been taking Zinc, vitamin D and C. He has a friend who was told to take this from one of his providers    9. Obesity (BMI 35.0-39.9) with comorbidity (H)    Continue to exercise regularly and work on weight loss, increase water intake  Limits processed foods, sodas, alcohol and sweets            There are no Patient Instructions on file for this visit.  Medications Discontinued During This Encounter   Medication Reason     oxyCODONE (ROXICODONE) 5 MG immediate release tablet      ondansetron (ZOFRAN-ODT) 4 MG disintegrating tablet      omeprazole (PRILOSEC) 20 MG capsule      mupirocin (BACTROBAN) 2 % ointment      ketorolac (TORADOL) 10 mg tablet      hydrOXYzine HCL (ATARAX) 25 MG tablet      cephalexin (KEFLEX) 500 MG capsule      HUMALOG 100 unit/mL injection      amLODIPine (NORVASC)  10 MG tablet Reorder     losartan (COZAAR) 25 MG tablet Reorder     metFORMIN (GLUCOPHAGE) 1000 MG tablet Reorder     triamterene-hydrochlorothiazide (DYAZIDE) 37.5-25 mg per capsule Reorder     Return in about 6 months (around 6/30/2021) for diabetes, hypertension, hyperlipidemia.    The visit lasted a total of 45 minutes face to face with the patient.  Over 50% of the time spent counseling and educating the patient about hypertension, hyperlipidemia, diabetes, sleep apnea, back pain, cough, sebaceous cyst, health prevention    Imelda Beatty NP          SUBJECTIVE:  Ashutosh Vieira is a 65 y.o. male presents for med check    Hypertension/hyperlipidemia: Patient's blood pressure is borderline elevated today x2.  He denies any recent concerning symptoms today such as dizziness, chest pain, diaphoresis or shortness of breath.  Current blood pressure medications include losartan 25 mg daily, Dyazide daily and amlodipine 10 mg daily.  He is taking a statin at this time.  He has no history of coronary artery disease or previous stroke.  Patient is a non-smoker, alcohol use is socially, he denies any illicit drug use.  Current BMI 35.1    Type 2 diabetes: Non-insulin-dependent.  Current medication includes metformin at the maximum dose daily along with glipizide 5 mg daily.  Patient brought his glucose log with him today for review.  Fasting glucose has been ranging between 1 50-1 60, postmeal glucose levels also averaging 1 50-1 60.  He admits that his diet has not been great, he continues to eat sweets and processed foods regularly.  Mealtimes have been rather inconsistent.  Some days he will eat a couple brownie bites in the morning and nothing till dinner.  Last vision test was November and his prescription was updated at that time.  He has gained 7 pounds since his last office visit.  He recently started using his stationary bike again at home, he recently had hip surgery.  He has been feeling great since hip  surgery and has no concerns regarding his postop care.  He denies any recent urinary changes, no numbness, tingling or pain in the feet.  He does perform his own nail care and inspects and moisturizes his feet regularly.    ANA with CPAP use: Compliant with sleep medicine office visits, he recently received new equipment in November.  He feels restful and has no concerns since he received his new machine.  Settings were adjusted upon receiving his new equipment.    Back pain: Recent hip replacement surgery completed roughly 3 months ago.  Hip has been feeling great since surgery, he is no longer using any pain medications.  He continues to have some pain in the left lumbar region.  MRI from 1 year ago showed bulging disks from the L4-S1.  Pain will extend over to the right side intermittently.  He finds that his pain becomes worse when he stands in the same position for too long.  He has not been drinking adequate amounts of water and he states he could be better about doing this.  He denies any leg weakness, falls, incontinence issues in regards to his back pain.  He finds it sitting and taking extra strength Tylenol does tend to relieve his pain, he also remove the memory foam pad from their mattress which has also been helpful.    Cyst: He has had a cyst in the mid upper back for roughly over 1 year now.  He has noticed that the cyst has grown in size, denies redness or swelling, no significant pain with this.  He has not tried any home remedies for the cyst, he has not tried to penetrate the surface.  Denies any flulike symptoms since his cyst started.  He would like his cyst drained today.  Explained the pros and cons of the procedure with the patient today.  He has elected to proceed.  Cyst was cleaned using topical alcohol.  1-1/2 cc of lidocaine with epinephrine solution was injected around the cyst.  After several minutes, patient confirmed that the area was numb.  Using a #11 blade scalpel, half inch  incision was made directly through the affected area.  Large amount of thick, brown pus was removed from the cyst, cyst had extremely foul odor.  Minimal bleeding was noted during the procedure.  Post procedure, site was covered using a large Band-Aid.  There was no bleeding noted post procedure and patient tolerated without difficulty.    Cough: He does note a intermittent dry cough recently, his recent Covid test was negative.  When speaking to a friend who had Covid, it was recommended that his friend take zinc, vitamin D and vitamin C which he shared with Ashutosh.  Ashutosh has been taking the supplement since that time and other than his dry cough, he feels well overall.    Need for vaccine: Due for shingles vaccine  Chief Complaint   Patient presents with     Follow-up     med check         Patient Active Problem List   Diagnosis     Male Erectile Disorder Due To Physical Condition     Essential Hypercholesterolemia     Obesity     Obstructive Sleep Apnea     Essential Hypertension     Benign Adenomatous Polyp Of The Large Intestine     Allergic Rhinitis     Diabetes mellitus (H)     Tinnitus     Familial combined hyperlipidemia     Hip pain, left     Obesity (BMI 35.0-39.9) with comorbidity (H)       Current Outpatient Medications   Medication Sig Dispense Refill     ACCU-CHEK SMARTVIEW TEST STRIP strips USE 1 STRIP TO CHECK GLUCOSE TWICE DAILY 200 strip 3     amLODIPine (NORVASC) 10 MG tablet Take 1 tablet (10 mg total) by mouth daily. 90 tablet 1     glipiZIDE (GLUCOTROL XL) 5 MG 24 hr tablet Take 1 tablet (5 mg total) by mouth daily with breakfast. 90 tablet 1     lancets (ACCU-CHEK MULTICLIX LANCET) Misc Use 100 each As Directed 2 (two) times a day. 200 each 0     metFORMIN (GLUCOPHAGE) 1000 MG tablet Take 1 tablet (1,000 mg total) by mouth 2 (two) times a day with meals. 180 tablet 1     simvastatin (ZOCOR) 40 MG tablet Take 1 tablet (40 mg total) by mouth at bedtime. 90 tablet 1      triamterene-hydrochlorothiazide (DYAZIDE) 37.5-25 mg per capsule Take 1 capsule by mouth daily. 90 capsule 1     losartan (COZAAR) 25 MG tablet Take 1 tablet (25 mg total) by mouth daily. 90 tablet 1     No current facility-administered medications for this visit.        Social History     Tobacco Use   Smoking Status Current Some Day Smoker   Smokeless Tobacco Never Used   Tobacco Comment    cigars       REVIEW OF SYSTEMS: Denies dysuria, frequency, urgency, fevers, chills, nausea, vomiting or abdominal pain.      TOBACCO USE:  Social History     Tobacco Use   Smoking Status Current Some Day Smoker   Smokeless Tobacco Never Used   Tobacco Comment    cigars     Social History     Socioeconomic History     Marital status:      Spouse name: Isela     Number of children: 2     Years of education: 16     Highest education level: Not on file   Occupational History     Occupation:    Social Needs     Financial resource strain: Not on file     Food insecurity     Worry: Not on file     Inability: Not on file     Transportation needs     Medical: Not on file     Non-medical: Not on file   Tobacco Use     Smoking status: Current Some Day Smoker     Smokeless tobacco: Never Used     Tobacco comment: cigars   Substance and Sexual Activity     Alcohol use: Yes     Comment: weekends     Drug use: No     Sexual activity: Yes     Partners: Female   Lifestyle     Physical activity     Days per week: Not on file     Minutes per session: Not on file     Stress: Not on file   Relationships     Social connections     Talks on phone: Not on file     Gets together: Not on file     Attends [employer] service: Not on file     Active member of club or organization: Not on file     Attends meetings of clubs or organizations: Not on file     Relationship status: Not on file     Intimate partner violence     Fear of current or ex partner: Not on file     Emotionally abused: Not on file     Physically abused: Not on file      Forced sexual activity: Not on file   Other Topics Concern     Not on file   Social History Narrative    Travels weekly for work as a .          OBJECTIVE:            Vitals:    12/31/20 1031   BP: (!) 140/100   Pulse:    SpO2:      Weight: (!) 259 lb 1 oz (117.5 kg)    Wt Readings from Last 3 Encounters:   12/31/20 (!) 259 lb 1 oz (117.5 kg)   09/10/20 (!) 257 lb 6 oz (116.7 kg)   06/30/20 (!) 256 lb 2 oz (116.2 kg)     Body mass index is 35.14 kg/m .        Physical Exam:  GENERAL APPEARANCE: A&A, NAD, well hydrated, well nourished  HEAD: atraumatic, no deformity  EYES: PERRL, EOM's intact, no redness or swelling  MOUTH: without erythema, exudate or thrush  NECK: Supple, without lymphadenopathy, no thyroid mass, no JVD, no bruit  CV: RRR, no M/G/R, no LE swelling, pulses intact   LUNGS: CTAB, normal respiratory effort  ABDOMEN: S&NT, no masses, no organomegaly, BS present x4   BACK: full ROM, no pain with direct palpation on the bilateral SI joints. Upper mid back with marble sized sebaceous cyst. No redness present.   SKIN:  Normal skin turgor, no lesions/rashes, warm and dry   NEURO: no gross deficits, DTR's intact, face symmetrical, no drift, equal  strength, negative Romberg   PSYCHIATRIC;  Mood appropriate, memory intact, good eye contact, engaged in conversation

## 2021-06-15 NOTE — PROGRESS NOTES
1. Diabetes mellitus  Glycosylated Hemoglobin A1c    Vitamin B12    metFORMIN (GLUCOPHAGE) 1000 MG tablet    glipiZIDE (GLUCOTROL XL) 10 MG 24 hr tablet   2. Essential Hypercholesterolemia  Lipid [place] FASTING   3. Essential hypertension  Comprehensive Metabolic Panel    amLODIPine (NORVASC) 10 MG tablet    triamterene-hydrochlorothiazide (DYAZIDE) 37.5-25 mg per capsule    losartan (COZAAR) 25 MG tablet   4. Male Erectile Disorder Due To Physical Condition  PSA, Annual Screen (Prostatic-Specific Antigen)   5. Familial combined hyperlipidemia  simvastatin (ZOCOR) 40 MG tablet     Med list reconciled      ASSESSMENT/PLAN:     The following high BMI interventions were performed this visit: encouragement to exercise and weight monitoring    1. Diabetes mellitus    - Glycosylated Hemoglobin A1c  - Vitamin B12  - metFORMIN (GLUCOPHAGE) 1000 MG tablet; Take 1 tablet (1,000 mg total) by mouth 2 (two) times a day with meals.  Dispense: 180 tablet; Refill: 1  - glipiZIDE (GLUCOTROL XL) 10 MG 24 hr tablet; Take 1 tablet (10 mg total) by mouth daily.  Dispense: 90 tablet; Refill: 1    2. Essential Hypercholesterolemia    - Lipid [place] FASTING    3. Essential hypertension    - Comprehensive Metabolic Panel  - amLODIPine (NORVASC) 10 MG tablet; Take 1 tablet (10 mg total) by mouth daily.  Dispense: 90 tablet; Refill: 1  - triamterene-hydrochlorothiazide (DYAZIDE) 37.5-25 mg per capsule; Take 1 capsule by mouth daily.  Dispense: 90 capsule; Refill: 1  - losartan (COZAAR) 25 MG tablet; Take 1 tablet (25 mg total) by mouth daily.  Dispense: 90 tablet; Refill: 1    4. Male Erectile Disorder Due To Physical Condition    - PSA, Annual Screen (Prostatic-Specific Antigen)    5. Familial combined hyperlipidemia    - simvastatin (ZOCOR) 40 MG tablet; Take 1 tablet (40 mg total) by mouth at bedtime.  Dispense: 90 tablet; Refill: 1    There are no Patient Instructions on file for this visit.  Medications Discontinued During This  "Encounter   Medication Reason     amLODIPine (NORVASC) 10 MG tablet Reorder     triamterene-hydrochlorothiazide (DYAZIDE) 37.5-25 mg per capsule Reorder     losartan (COZAAR) 25 MG tablet Reorder     metFORMIN (GLUCOPHAGE) 500 MG tablet Dose adjustment     glipiZIDE (GLUCOTROL XL) 10 MG 24 hr tablet Reorder     simvastatin (ZOCOR) 40 MG tablet Reorder     Return to clinic in 3 months for next diabetes follow-up.  Metformin increased from 500 mg twice daily to 1000 mg twice daily.  Patient's A1c is elevated at 7.7, increased from previous 7.3.  The visit lasted a total of 25 minutes face to face with the patient.  Over 50% of the time spent counseling and educating the patient about above content.      Imelda Beatty NP          SUBJECTIVE:  Ashutosh Vieira is a 62 y.o. male once for diabetes check, medication check and fasting lab work today.  Patient continues to struggle with checking his glucose levels regularly.  He did check his blood sugars twice daily for a total of 8 days prior to his visit today.  A.m. glucose levels have been ranging between 151-196 and p.m. glucose levels ranging between 102-218.  He denies any recent episodes of hypoglycemic events.  Breakfast typically includes a banana with coffee, lunch he states is \"hit or miss\" due to his work hours.  He does travel regularly for work and will typically eat a very large meal for dinner that is \"very unhealthy for him \".  He has no issues with pain in his feet, numbness or tingling, no ulcerations or callus formation.  He does perform his own nail care.  He is current with his eye exam, he is wearing bifocals.  He does not exercise regularly, we discussed his current weight and BMI of 35.6 today.  He drinks alcohol intermittently, and he does not smoke.  Is not on insulin therapy at this time, oral medications include metformin 500 mg twice daily and glipizide 10 mg daily.  Vital signs are stable, blood pressure is very well controlled on current " medication therapy.  He is due for fasting lab work to follow-up with his hyperlipidemia.  He is inquiring about a PSA check since his has not been checked for roughly 1-1/2 years. He does struggle with erectile dysfunction issues. He does continue to wear his CPAP regularly at night for his obstructive sleep apnea.  He is due for follow-up to check his mask and machine.  Discussed paperwork needed for this, reviewed previous paperwork and media tab with patient. Vital signs are stable today.  Chief Complaint   Patient presents with     Follow-up     Med Check - fasting         Patient Active Problem List   Diagnosis     Male Erectile Disorder Due To Physical Condition     Plantar Fasciitis     Prepatellar Bursitis     Essential Hypercholesterolemia     Obesity     Obstructive Sleep Apnea     Essential Hypertension     Benign Adenomatous Polyp Of The Large Intestine     Allergic Rhinitis     Diabetes mellitus     Tinnitus     Familial combined hyperlipidemia       Current Outpatient Prescriptions   Medication Sig Dispense Refill     amLODIPine (NORVASC) 10 MG tablet Take 1 tablet (10 mg total) by mouth daily. 90 tablet 1     blood glucose test strips Use 100 each As Directed 2 (two) times a day. 200 strip 5     glipiZIDE (GLUCOTROL XL) 10 MG 24 hr tablet Take 1 tablet (10 mg total) by mouth daily. 90 tablet 1     lancets (ACCU-CHEK MULTICLIX LANCET) Misc Use 100 each As Directed 2 (two) times a day. 200 each 0     loratadine (CLARITIN) 10 mg tablet Take 10 mg by mouth daily.       losartan (COZAAR) 25 MG tablet Take 1 tablet (25 mg total) by mouth daily. 90 tablet 1     simvastatin (ZOCOR) 40 MG tablet Take 1 tablet (40 mg total) by mouth at bedtime. 90 tablet 1     triamterene-hydrochlorothiazide (DYAZIDE) 37.5-25 mg per capsule Take 1 capsule by mouth daily. 90 capsule 1     metFORMIN (GLUCOPHAGE) 1000 MG tablet Take 1 tablet (1,000 mg total) by mouth 2 (two) times a day with meals. 180 tablet 1     No current  facility-administered medications for this visit.        History   Smoking Status     Current Some Day Smoker   Smokeless Tobacco     Never Used     Comment: cigars       REVIEW OF SYSTEMS: Denies dysuria, frequency, urgency, fevers, chills, back pain, nausea, vomiting or abdominal pain.      TOBACCO USE:  History   Smoking Status     Current Some Day Smoker   Smokeless Tobacco     Never Used     Comment: cigars     Social History     Social History     Marital status:      Spouse name: Isela     Number of children: 2     Years of education: 16     Occupational History           Social History Main Topics     Smoking status: Current Some Day Smoker     Smokeless tobacco: Never Used      Comment: cigars     Alcohol use Yes      Comment: weekends     Drug use: No     Sexual activity: Yes     Partners: Female     Other Topics Concern     Not on file     Social History Narrative    Travels weekly for work as a .          OBJECTIVE:            Vitals:    01/26/18 1351   BP: 120/84   Pulse: 80   Resp: 18   Temp: 98.5  F (36.9  C)   SpO2: 92%     Weight: 262 lb 8 oz (119.1 kg)  Wt Readings from Last 3 Encounters:   01/26/18 (!) 262 lb 8 oz (119.1 kg)   07/13/17 (!) 261 lb (118.4 kg)   12/28/16 (!) 263 lb 1.6 oz (119.3 kg)     Body mass index is 35.6 kg/(m^2).        Physical Exam:  GENERAL APPEARANCE: A&A, NAD, well hydrated, well nourished  HEAD: atraumatic, no deformity  EYES: PERRL, EOM's intact, no redness or swelling  NECK: Supple, without lymphadenopathy, no thyroid mass, no JVD, no bruit  CV: RRR, no M/G/R, no edema   LUNGS: CTAB, normal respiratory effort  ABDOMEN: S&NT, no masses, no organomegaly, BS present x4   BACK: small cyst formation on mid back, no redness or drainage  SKIN:  Normal skin turgor, no lesions/rashes, warm and dry

## 2021-06-15 NOTE — TELEPHONE ENCOUNTER
Sent hydrochlorothiazide 25 mg daily and Metoprolol 25 mg XL daily in placed of  Dyazide/HCTZ 50/25 daily

## 2021-06-15 NOTE — TELEPHONE ENCOUNTER
Fax received from [facility] stating that patient's insurance will not cover triamterene-hydrochlorothiazid (DYAZIDE) 50-25 mg per capsule.    Please send alternative or start a PA.      Would you like to send in an alternative or have a PA started?

## 2021-06-16 PROBLEM — E66.01 MORBID OBESITY (H): Status: ACTIVE | Noted: 2020-12-31

## 2021-06-16 PROBLEM — M25.552 HIP PAIN, LEFT: Status: ACTIVE | Noted: 2020-01-09

## 2021-06-16 PROBLEM — E78.49 FAMILIAL COMBINED HYPERLIPIDEMIA: Status: ACTIVE | Noted: 2018-01-26

## 2021-06-20 NOTE — LETTER
Letter by Mirta Hassan MD at      Author: Mirta Hassan MD Service: -- Author Type: --    Filed:  Encounter Date: 6/30/2020 Status: (Other)         Ashutosh Vieira  [address] [place] [id number]            June 30, 2020        Dear Mr. Vieira,        Below are the results from your recent visit:    Resulted Orders   HM2(CBC w/o Differential)   Result Value Ref Range    WBC 6.1 4.0 - 11.0 thou/uL    RBC 4.83 4.40 - 6.20 mill/uL    Hemoglobin 15.3 14.0 - 18.0 g/dL    Hematocrit 44.9 40.0 - 54.0 %    MCV 93 80 - 100 fL    MCH 31.7 27.0 - 34.0 pg    MCHC 34.1 32.0 - 36.0 g/dL    RDW 12.1 11.0 - 14.5 %    Platelets 269 140 - 440 thou/uL    MPV 7.4 7.0 - 10.0 fL   Basic Metabolic Panel   Result Value Ref Range    Sodium 140 136 - 145 mmol/L    Potassium 3.9 3.5 - 5.0 mmol/L    Chloride 101 98 - 107 mmol/L    CO2 26 22 - 31 mmol/L    Anion Gap, Calculation 13 5 - 18 mmol/L    Glucose 145 (H) 70 - 125 mg/dL    Calcium 8.9 8.5 - 10.5 mg/dL    BUN 14 8 - 22 mg/dL    Creatinine 0.85 0.70 - 1.30 mg/dL    GFR MDRD Af Amer >60 >60 mL/min/1.73m2    GFR MDRD Non Af Amer >60 >60 mL/min/1.73m2    Narrative    Fasting Glucose reference range is 70-99 mg/dL per  American Diabetes Association (ADA) guidelines.   Lipid Cascade   Result Value Ref Range    Cholesterol 170 <=199 mg/dL    Triglycerides 131 <=149 mg/dL    HDL Cholesterol 38 (L) >=40 mg/dL    LDL Calculated 106 <=129 mg/dL    Patient Fasting > 8hrs? Yes    Glycosylated Hemoglobin A1c   Result Value Ref Range    Hemoglobin A1c 6.7 (H) 3.5 - 6.0 %   PSA (Prostatic-Specific Antigen), Annual Screen   Result Value Ref Range    PSA 0.7 0.0 - 4.5 ng/mL    Narrative    Method is Abbott Prostate-Specific Antigen (PSA)  Standard-WHO 1st International (90:10)         Ashutosh, your A1c dropped to 6.7 which is excellent.  Keep up the good work.  We should visit again in 6 months.  If you have any questions I will hesitate to let me know.    Sincerely,        OTILIA  MD Mo, MIC  [facility]  [phone]

## 2021-06-23 NOTE — PROGRESS NOTES
1. Essential Hypercholesterolemia     2. Essential hypertension  Comprehensive Metabolic Panel    amLODIPine (NORVASC) 10 MG tablet    losartan (COZAAR) 25 MG tablet    triamterene-hydrochlorothiazide (DYAZIDE) 37.5-25 mg per capsule   3. Diabetes mellitus (H)  Glycosylated Hemoglobin A1c    Microalbumin, Random Urine    glipiZIDE (GLUCOTROL XL) 5 MG 24 hr tablet    metFORMIN (GLUCOPHAGE) 1000 MG tablet   4. Familial combined hyperlipidemia  simvastatin (ZOCOR) 40 MG tablet         ASSESSMENT/PLAN:     The following high BMI interventions were performed this visit: encouragement to exercise, weight monitoring, dietary needs education, exercise promotion: strength training and exercise promotion: stretching    1. Essential Hypercholesterolemia    -continue on statin    2. Essential hypertension    - Comprehensive Metabolic Panel  - amLODIPine (NORVASC) 10 MG tablet; Take 1 tablet (10 mg total) by mouth daily.  Dispense: 90 tablet; Refill: 1  - losartan (COZAAR) 25 MG tablet; Take 1 tablet (25 mg total) by mouth daily.  Dispense: 90 tablet; Refill: 1  - triamterene-hydrochlorothiazide (DYAZIDE) 37.5-25 mg per capsule; Take 1 capsule by mouth daily.  Dispense: 90 capsule; Refill: 1    3. Diabetes mellitus (H)    - Glycosylated Hemoglobin A1c  - Microalbumin, Random Urine  - glipiZIDE (GLUCOTROL XL) 5 MG 24 hr tablet; Take 1 tablet (5 mg total) by mouth daily with breakfast.  Dispense: 90 tablet; Refill: 1  - metFORMIN (GLUCOPHAGE) 1000 MG tablet; Take 1 tablet (1,000 mg total) by mouth 2 (two) times a day with meals.  Dispense: 180 tablet; Refill: 1    4. Familial combined hyperlipidemia    - simvastatin (ZOCOR) 40 MG tablet; Take 1 tablet (40 mg total) by mouth at bedtime.  Dispense: 90 tablet; Refill: 1      There are no Patient Instructions on file for this visit.  Medications Discontinued During This Encounter   Medication Reason     amLODIPine (NORVASC) 10 MG tablet Reorder     glipiZIDE (GLUCOTROL XL) 5 MG 24 hr  tablet Reorder     losartan (COZAAR) 25 MG tablet Reorder     metFORMIN (GLUCOPHAGE) 1000 MG tablet Reorder     simvastatin (ZOCOR) 40 MG tablet Reorder     triamterene-hydrochlorothiazide (DYAZIDE) 37.5-25 mg per capsule Reorder     Return in about 6 months (around 7/24/2019) for hyperlipidemia, hypertension, diabetes.         Imelda Beatty NP          SUBJECTIVE:  Ashutosh Vieira is a 63 y.o. male who presents for his diabetes, hypertension and hyperlipidemia follow-up today.  Patient did bring his glucose log with him today from the last 10 days, he otherwise does not check his blood sugars consistently.  He is currently taking 1000 mg of metformin twice daily along with glipizide 5 mg daily.  Morning sugars over the last 10 days have been ranging from 119 up to 188.  P.m. sugars have been ranging from 98 up to 186.  Breakfast typically includes coffee and banana, he typically has a sandwich or burger for lunch, if he is traveling he will skip lunch.  Last night for dinner he did do drive up and  at [employer].  He states ever since the holidays he has struggled with getting back on track with weight loss and cleaning up his nutrition.  He has gained 7 pounds since his last office visit 6 months ago.  He recently had an eye exam performed in December, we did call and request that his records be faxed over to us.  Denies any increased urinary frequency, hematuria or dysuria today.  He does have full sensation of the feet, he does perform his own nail care, he denies numbness or tingling at all.  He does have a corn located on one toe on the right foot which I asked him to place a corn pad on, he did have a black, raised area on 1 2 of the left foot, I was able to remove this and it was a scab, he did have a small amount of bleeding which I placed a Band-Aid over.  He exercises when he is traveling during the week at hotels using the treadmill, he has cut his alcohol intake down since the holidays are  now over and is trying to only limit to 1 or 2 during the week.  He is a non-smoker, he denies any illicit drug use.  He realizes that he is gotten a bit off track but he is now refocused after we discussed eating small meals throughout the day so he is not overeating at night which seems to be a common problem for him.    Blood pressure is well controlled, currently taking amlodipine, Cozaar and triamterene.  He denies any chest pain, shortness of breath, vision changes, dizziness or fatigue.  He continues to take a statin for cholesterol control.  Again, he is working on weight loss, diet modifications and exercise.  His vital signs are stable.  He has no other concerns and all of his questions were answered.  Chief Complaint   Patient presents with     Follow-up     Med Check          Patient Active Problem List   Diagnosis     Male Erectile Disorder Due To Physical Condition     Plantar Fasciitis     Prepatellar Bursitis     Essential Hypercholesterolemia     Obesity     Obstructive Sleep Apnea     Essential Hypertension     Benign Adenomatous Polyp Of The Large Intestine     Allergic Rhinitis     Diabetes mellitus (H)     Tinnitus     Familial combined hyperlipidemia     Acute right-sided low back pain without sciatica       Current Outpatient Medications   Medication Sig Dispense Refill     amLODIPine (NORVASC) 10 MG tablet Take 1 tablet (10 mg total) by mouth daily. 90 tablet 1     blood glucose test strips Use 100 each As Directed 2 (two) times a day. 200 strip 5     glipiZIDE (GLUCOTROL XL) 5 MG 24 hr tablet Take 1 tablet (5 mg total) by mouth daily with breakfast. 90 tablet 1     lancets (ACCU-CHEK MULTICLIX LANCET) Misc Use 100 each As Directed 2 (two) times a day. 200 each 0     loratadine (CLARITIN) 10 mg tablet Take 10 mg by mouth daily.       losartan (COZAAR) 25 MG tablet Take 1 tablet (25 mg total) by mouth daily. 90 tablet 1     metFORMIN (GLUCOPHAGE) 1000 MG tablet Take 1 tablet (1,000 mg total)  by mouth 2 (two) times a day with meals. 180 tablet 1     simvastatin (ZOCOR) 40 MG tablet Take 1 tablet (40 mg total) by mouth at bedtime. 90 tablet 1     triamterene-hydrochlorothiazide (DYAZIDE) 37.5-25 mg per capsule Take 1 capsule by mouth daily. 90 capsule 1     No current facility-administered medications for this visit.        Social History     Tobacco Use   Smoking Status Current Some Day Smoker   Smokeless Tobacco Never Used   Tobacco Comment    cigars       REVIEW OF SYSTEMS: Denies radiation, diaphoresis, shortness of breath, dizziness, syncope, nausea, palpitations, and associated with activity.       TOBACCO USE:  Social History     Tobacco Use   Smoking Status Current Some Day Smoker   Smokeless Tobacco Never Used   Tobacco Comment    cigars     Social History     Socioeconomic History     Marital status:      Spouse name: Isela     Number of children: 2     Years of education: 16     Highest education level: Not on file   Social Needs     Financial resource strain: Not on file     Food insecurity - worry: Not on file     Food insecurity - inability: Not on file     Transportation needs - medical: Not on file     Transportation needs - non-medical: Not on file   Occupational History     Occupation:    Tobacco Use     Smoking status: Current Some Day Smoker     Smokeless tobacco: Never Used     Tobacco comment: cigars   Substance and Sexual Activity     Alcohol use: Yes     Comment: weekends     Drug use: No     Sexual activity: Yes     Partners: Female   Other Topics Concern     Not on file   Social History Narrative    Travels weekly for work as a .          OBJECTIVE:            Vitals:    01/24/19 1349   BP: 120/66   Pulse: 85   Resp: 16   Temp: 98.6  F (37  C)   SpO2: 96%     Weight: (!) 252 lb (114.3 kg)    Wt Readings from Last 3 Encounters:   01/24/19 (!) 252 lb (114.3 kg)   07/27/18 (!) 245 lb (111.1 kg)   05/18/18 (!) 261 lb (118.4 kg)     Body mass index  is 34.18 kg/m .        Physical Exam:  GENERAL APPEARANCE: A&A, NAD, well hydrated, well nourished  HEAD: atraumatic, no deformity  EYES: PERRL, EOM's intact, no redness or swelling  MOUTH: without erythema, exudate or thrush  NECK: Supple, without lymphadenopathy, no thyroid mass, no JVD, no bruit  CV: RRR, no M/G/R, no edema.  Bilateral pedal and posttibial pulses palpable, 2+.  LUNGS: CTAB, normal respiratory effort  ABDOMEN: S&NT, no masses, no organomegaly, BS present x4   EXTREMITY: no edema, small corn on the right third toe, raised, scabbed over area on the left second toe.  Scab removed, small amount of bleeding noted and covered with Band-Aid.  Patient does have full sensation per microfilament testing today.  Nails are healthy, not thickened or yellow.  SKIN:  Normal skin turgor, no lesions/rashes, warm and dry
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
## 2021-06-26 NOTE — PROGRESS NOTES
Progress Notes by Imelda Beatty NP at 7/27/2018  2:00 PM     Author: Imelda Beatty NP Service: -- Author Type: Nurse Practitioner    Filed: 7/30/2018  5:41 PM Encounter Date: 7/27/2018 Status: Signed    : Imelda Beatty NP (Nurse Practitioner)       1. Encounter for medication refill     2. Diabetes mellitus (H)  Glycosylated Hemoglobin A1c    Vitamin B12    glipiZIDE (GLUCOTROL XL) 5 MG 24 hr tablet    metFORMIN (GLUCOPHAGE) 1000 MG tablet   3. Essential hypertension  triamterene-hydrochlorothiazide (DYAZIDE) 37.5-25 mg per capsule    losartan (COZAAR) 25 MG tablet    Comprehensive Metabolic Panel    amLODIPine (NORVASC) 10 MG tablet   4. Familial combined hyperlipidemia  Lipid [place] FASTING     The 10-year ASCVD risk score (Jennyhaim GUAMAN Jr, et al., 2013) is: 22.2%    Values used to calculate the score:      Age: 63 years      Sex: Male      Is Non- : No      Diabetic: Yes      Tobacco smoker: Yes      Systolic Blood Pressure: 110 mmHg      Is BP treated: Yes      HDL Cholesterol: 33 mg/dL      Total Cholesterol: 134 mg/dL      ASSESSMENT/PLAN:     The following high BMI interventions were performed this visit: encouragement to exercise and weight monitoring    1. Encounter for medication refill      2. Diabetes mellitus (H)    - Glycosylated Hemoglobin A1c  - Vitamin B12  - glipiZIDE (GLUCOTROL XL) 5 MG 24 hr tablet; Take 1 tablet (5 mg total) by mouth daily with breakfast.  Dispense: 90 tablet; Refill: 1  - metFORMIN (GLUCOPHAGE) 1000 MG tablet; Take 1 tablet (1,000 mg total) by mouth 2 (two) times a day with meals.  Dispense: 180 tablet; Refill: 1    3. Essential hypertension    - triamterene-hydrochlorothiazide (DYAZIDE) 37.5-25 mg per capsule; Take 1 capsule by mouth daily.  Dispense: 90 capsule; Refill: 1  - losartan (COZAAR) 25 MG tablet; Take 1 tablet (25 mg total) by mouth daily.  Dispense: 90 tablet; Refill: 1  - Comprehensive Metabolic Panel  - amLODIPine  (NORVASC) 10 MG tablet; Take 1 tablet (10 mg total) by mouth daily.  Dispense: 90 tablet; Refill: 1    4. Familial combined hyperlipidemia    - Lipid [place] FASTING    Patient Instructions     Diabetes: Activity Tips    Being more active can help you manage your diabetes. The tips on this sheet can help you get the most from your exercise. They can also help you stay safe.  Staying Active  Its important for adults to spend less time sitting and being inactive. This is especially true if you have type 2 diabetes. When you are sitting for long periods of time, get up for short sessions of light activity every 30 minutes.  You should aim for at least 150 minutes a week of exercise or physical activity. Dont let more than 2 days go by without being active.  Benefit from briskness  Brisk activity gets your heart beating faster. This can help you increase your fitness, lose extra weight, and manage your blood sugar level. Try brisk walking. Or, if you have foot or leg problems, you can try swimming or bike riding. You can break up your exercise into chunks throughout the day. Work up to at least 30 minutes of steady, brisk exercise on most days.  Warm up and cool down  Warming up and cooling down reduce your risk of injury. They also help limit muscle soreness. Do a mild version of your activity for 5 minutes before and after your routine. You can also learn stretches that will help keep your muscles loose. Your healthcare provider may show you good ways to warm up and stretch.  Do the talk-sing test  The talk-sing test is a simple way to tell how hard youre exercising. If you can talk while exercising, youre in a safe range. If youre out of breath, slow down. If you can carry a tune, its time to  the pace. Walk up a hill. Increase the resistance on your stationary bike. Or swim faster.  What about eating?  You may be told to plan your exercise for 1 to 2 hours after a meal. In most cases, you dont need to eat  while being active. If you take insulin or medicine that can cause low blood sugar, test your blood sugar before exercising. And carry a fast-acting sugar that will raise your blood sugar level quickly. This includes glucose tablets or hard candy. Use it if you feel low blood sugar symptoms.  Safety tips  These tips can help you stay safe as you become fit:    Exercise with a friend or carry a cell phone if you have one.    Carry or wear identification, such as a necklace or bracelet, that says you have diabetes.    Use the proper footwear and safety equipment for your activity.    Drink water before, during, and after exercise.    Dress properly for the weather.    Dont exercise in very hot or very cold weather.    Dont exercise if you are sick.    If you are instructed to do so, test your blood sugar before and after you exercise. Have a small carbohydrate snack if your blood sugar is low before you start exercising.   When to stop exercising and call your healthcare provider  Stop exercising and call your healthcare provider right away if you notice any of the following:    Pain, pressure, tightness, or heaviness in the chest    Pain or heaviness in the neck, shoulders, back, arms, legs, or feet    Unusual shortness of breath    Dizziness or lightheadedness    Unusually rapid or slow pulse    Increased joint or muscle pain    Nausea or vomiting  Date Last Reviewed: 5/1/2016 2000-2017 [employer]. [address] [place] [address], [place] [id number]. All rights reserved. This information is not intended as a substitute for professional medical care. Always follow your healthcare professional's instructions.          Medications Discontinued During This Encounter   Medication Reason   ? cyclobenzaprine (FLEXERIL) 10 MG tablet Therapy completed   ? predniSONE (DELTASONE) 10 mg tablet Therapy completed   ? triamterene-hydrochlorothiazide (DYAZIDE) 37.5-25 mg per capsule Reorder   ? losartan (COZAAR) 25 MG  "tablet Reorder   ? amLODIPine (NORVASC) 10 MG tablet Reorder   ? glipiZIDE (GLUCOTROL XL) 10 MG 24 hr tablet Dose adjustment   ? metFORMIN (GLUCOPHAGE) 1000 MG tablet Reorder     Return in about 6 months (around 1/27/2019) for diabetes, hyperlipidemia, hypertension.    The visit lasted a total of 25 minutes face to face with the patient.  Over 50% of the time spent counseling and educating the patient about above content.      Imelda Beatty NP          SUBJECTIVE:  Ashutosh Vieira is a 63 y.o. male who presents for his diabetes follow-up and medication check today.  Blood pressure is currently controlled on current medications.  Patient has been working on losing weight, he has lost 16 pounds since May 2018.  I am extremely happy to see the strides he is making in regards to his health.  He tells me \"I feel so much better and I plan on losing more \".  He did join the Healthy Weight challenge at his work, this challenge began in June 2018, this has motivated him even more to lose weight.  We reviewed his glucose log from the last week, patient did bring me a printout of this today.  A.m. blood sugars fasting have been ranging from  and p.m. sugars .  His shortness of breath with activity has improved greatly since losing 16 pounds and his back pain has decreased.  He only drinks water, he does not drink soda, alcohol use is socially, he is a non-smoker.  He denies vision changes, last eye exam was May 2017, he is due for this.  He denies any numbness or tingling in the feet.  He does have one small abscess on the anterior surface of the second right toe that measures 1.0 x 0.8 cm today, there is no drainage, it does appear to be a blister, he did poke at the other day with a needle and clear fluid did leak out.  I have advised patient to continue monitoring the abscess as he is more prone to infection because of his diabetes.  He has been substituting vegetables and salads in places of starches such " as French fries, rice and pastas.  He is eating more chicken and lean meats instead of red meats, he has increased his fruit and vegetable intake.  He drinks about 20 ounces of caffeine a day.  Again, I applauded him during his visit today and congratulated him on his 16 pound weight loss.  Lab work ordered today, his vital signs are stable, will send refills of medications once I review the results of his A1c and cholesterol levels.  Patient agrees with plan of care.  Chief Complaint   Patient presents with   ? Follow Up     Med/BP check -fasting         Patient Active Problem List   Diagnosis   ? Male Erectile Disorder Due To Physical Condition   ? Plantar Fasciitis   ? Prepatellar Bursitis   ? Essential Hypercholesterolemia   ? Obesity   ? Obstructive Sleep Apnea   ? Essential Hypertension   ? Benign Adenomatous Polyp Of The Large Intestine   ? Allergic Rhinitis   ? Diabetes mellitus (H)   ? Tinnitus   ? Familial combined hyperlipidemia   ? Acute right-sided low back pain without sciatica       Current Outpatient Prescriptions   Medication Sig Dispense Refill   ? amLODIPine (NORVASC) 10 MG tablet Take 1 tablet (10 mg total) by mouth daily. 90 tablet 1   ? blood glucose test strips Use 100 each As Directed 2 (two) times a day. 200 strip 5   ? lancets (ACCU-CHEK MULTICLIX LANCET) Misc Use 100 each As Directed 2 (two) times a day. 200 each 0   ? loratadine (CLARITIN) 10 mg tablet Take 10 mg by mouth daily.     ? metFORMIN (GLUCOPHAGE) 1000 MG tablet Take 1 tablet (1,000 mg total) by mouth 2 (two) times a day with meals. 180 tablet 1   ? glipiZIDE (GLUCOTROL XL) 5 MG 24 hr tablet Take 1 tablet (5 mg total) by mouth daily with breakfast. 90 tablet 1   ? losartan (COZAAR) 25 MG tablet Take 1 tablet (25 mg total) by mouth daily. 90 tablet 1   ? simvastatin (ZOCOR) 40 MG tablet Take 1 tablet (40 mg total) by mouth at bedtime. 90 tablet 3   ? triamterene-hydrochlorothiazide (DYAZIDE) 37.5-25 mg per capsule Take 1 capsule  by mouth daily. 90 capsule 1     No current facility-administered medications for this visit.        History   Smoking Status   ? Current Some Day Smoker   Smokeless Tobacco   ? Never Used     Comment: cigars       REVIEW OF SYSTEMS: Denies dysuria, frequency, urgency, fevers, chills, back pain, nausea, vomiting or abdominal pain.      TOBACCO USE:  History   Smoking Status   ? Current Some Day Smoker   Smokeless Tobacco   ? Never Used     Comment: cigars     Social History     Social History   ? Marital status:      Spouse name: Isela   ? Number of children: 2   ? Years of education: 16     Occupational History   ?       Social History Main Topics   ? Smoking status: Current Some Day Smoker   ? Smokeless tobacco: Never Used      Comment: cigars   ? Alcohol use Yes      Comment: weekends   ? Drug use: No   ? Sexual activity: Yes     Partners: Female     Other Topics Concern   ? Not on file     Social History Narrative    Travels weekly for work as a .          OBJECTIVE:            Vitals:    07/27/18 1341   BP: 110/74   Pulse: 67   Resp: 16   SpO2: 98%     Weight: 245 lb (111.1 kg)  Wt Readings from Last 3 Encounters:   07/27/18 (!) 245 lb (111.1 kg)   05/18/18 (!) 261 lb (118.4 kg)   01/26/18 (!) 262 lb 8 oz (119.1 kg)     Body mass index is 33.23 kg/(m^2).        Physical Exam:  GENERAL APPEARANCE: A&A, NAD, well hydrated, well nourished  HEAD: atraumatic, no deformity  EYES: PERRL, EOM's intact, no redness or swelling  NECK: Supple, without lymphadenopathy, no thyroid mass, no JVD, no bruit  CV: RRR, no M/G/R, no edema, pedal and posttibial pulses palpable, 2+ bilaterally   LUNGS: CTAB, normal respiratory effort  ABDOMEN: S&NT, no masses, no organomegaly, BS present x4   EXTREMITY: 2nd toe on right foot with 1.0 x 0.8 cm blister, mild erythema, no drainage, moderately raised, not hot to touch, no difficulty ambulating  SKIN:  Normal skin turgor, no lesions/rashes, warm and dry

## 2021-07-01 ENCOUNTER — RECORDS - HEALTHEAST (OUTPATIENT)
Dept: ADMINISTRATIVE | Facility: OTHER | Age: 66
End: 2021-07-01

## 2021-07-01 ENCOUNTER — OFFICE VISIT - HEALTHEAST (OUTPATIENT)
Dept: FAMILY MEDICINE | Facility: CLINIC | Age: 66
End: 2021-07-01

## 2021-07-01 DIAGNOSIS — I10 ESSENTIAL HYPERTENSION: ICD-10-CM

## 2021-07-01 DIAGNOSIS — E11.9 TYPE 2 DIABETES MELLITUS WITHOUT COMPLICATION, WITHOUT LONG-TERM CURRENT USE OF INSULIN (H): ICD-10-CM

## 2021-07-01 DIAGNOSIS — E78.00 PURE HYPERCHOLESTEROLEMIA: ICD-10-CM

## 2021-07-01 DIAGNOSIS — Z12.5 SCREENING FOR PROSTATE CANCER: ICD-10-CM

## 2021-07-01 LAB — HBA1C MFR BLD: 7 %

## 2021-07-01 RX ORDER — LOSARTAN POTASSIUM 100 MG/1
100 TABLET ORAL DAILY
Qty: 90 TABLET | Refills: 1 | Status: SHIPPED | OUTPATIENT
Start: 2021-07-01 | End: 2021-12-28

## 2021-07-01 RX ORDER — AMLODIPINE BESYLATE 10 MG/1
10 TABLET ORAL DAILY
Qty: 90 TABLET | Refills: 1 | Status: SHIPPED | OUTPATIENT
Start: 2021-07-01 | End: 2021-12-28

## 2021-07-01 RX ORDER — SIMVASTATIN 80 MG
80 TABLET ORAL EVERY EVENING
Qty: 90 TABLET | Refills: 1 | Status: SHIPPED | OUTPATIENT
Start: 2021-07-01 | End: 2021-12-28

## 2021-07-01 RX ORDER — GLIPIZIDE 5 MG/1
5 TABLET ORAL
Qty: 180 TABLET | Refills: 1 | Status: SHIPPED | OUTPATIENT
Start: 2021-07-01 | End: 2021-12-28

## 2021-07-01 RX ORDER — HYDROCHLOROTHIAZIDE 25 MG/1
25 TABLET ORAL DAILY
Qty: 90 TABLET | Refills: 1 | Status: SHIPPED | OUTPATIENT
Start: 2021-07-01 | End: 2021-12-28

## 2021-07-01 RX ORDER — METOPROLOL SUCCINATE 25 MG/1
25 TABLET, EXTENDED RELEASE ORAL DAILY
Qty: 90 TABLET | Refills: 1 | Status: SHIPPED | OUTPATIENT
Start: 2021-07-01 | End: 2021-12-28

## 2021-07-01 ASSESSMENT — MIFFLIN-ST. JEOR: SCORE: 1950.65

## 2021-07-02 LAB
ANION GAP SERPL CALCULATED.3IONS-SCNC: 14 MMOL/L (ref 5–18)
BUN SERPL-MCNC: 12 MG/DL (ref 8–22)
CALCIUM SERPL-MCNC: 9 MG/DL (ref 8.5–10.5)
CHLORIDE BLD-SCNC: 103 MMOL/L (ref 98–107)
CO2 SERPL-SCNC: 25 MMOL/L (ref 22–31)
CREAT SERPL-MCNC: 0.79 MG/DL (ref 0.7–1.3)
GFR SERPL CREATININE-BSD FRML MDRD: >60 ML/MIN/1.73M2
GLUCOSE BLD-MCNC: 102 MG/DL (ref 70–125)
POTASSIUM BLD-SCNC: 3.5 MMOL/L (ref 3.5–5)
PSA SERPL-MCNC: 1.1 NG/ML (ref 0–4.5)
SODIUM SERPL-SCNC: 142 MMOL/L (ref 136–145)

## 2021-07-04 NOTE — PROGRESS NOTES
Progress Notes by Imelda Beatty NP at 7/1/2021  5:40 PM     Author: Imelda Beatty NP Service: -- Author Type: Nurse Practitioner    Filed: 7/4/2021  1:03 PM Encounter Date: 7/1/2021 Status: Signed    : Imelda Beatty NP (Nurse Practitioner)       1. Type 2 diabetes mellitus without complication, without long-term current use of insulin (H)  Glycosylated Hemoglobin A1c    Basic Metabolic Panel    metFORMIN (GLUCOPHAGE) 1000 MG tablet    glipiZIDE (GLUCOTROL) 5 MG tablet   2. Essential hypertension  metoprolol succinate (TOPROL XL) 25 MG    losartan (COZAAR) 100 MG tablet    hydroCHLOROthiazide (HYDRODIURIL) 25 MG tablet    amLODIPine (NORVASC) 10 MG tablet   3. Essential Hypercholesterolemia  simvastatin (ZOCOR) 80 MG tablet   4. Screening for prostate cancer  PSA, Annual Screen (Prostatic-Specific Antigen)    CANCELED: PSA, Annual Screen (Prostatic-Specific Antigen)         Assessment & Plan     Type 2 diabetes mellitus without complication, without long-term current use of insulin (H)    - Glycosylated Hemoglobin A1c  - Basic Metabolic Panel  - metFORMIN (GLUCOPHAGE) 1000 MG tablet  Dispense: 180 tablet; Refill: 1  - glipiZIDE (GLUCOTROL) 5 MG tablet  Dispense: 180 tablet; Refill: 1  Glucose levels are slightly elevated in the morning but they are at goal 2 hours postmeal.  He has lost 12 pounds since his last visit in December, he is active getting in his steps and he is golfing every Saturday morning.  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    He is up-to-date with vision screening, we did review his office notes from his last eye exam at [facility] which showed small cataracts, his prescription was updated.  There were no signs of diabetic retinopathy.    Essential hypertension    - metoprolol succinate (TOPROL XL) 25 MG  Dispense: 90 tablet; Refill: 1  - losartan (COZAAR) 100 MG tablet  Dispense: 90 tablet;  Refill: 1  - hydroCHLOROthiazide (HYDRODIURIL) 25 MG tablet  Dispense: 90 tablet; Refill: 1  - amLODIPine (NORVASC) 10 MG tablet  Dispense: 90 tablet; Refill: 1  Initial blood pressure slightly elevated today, recheck 10 minutes later was normotensive.  No dose adjustments were made today on his blood pressure medication    Essential Hypercholesterolemia    - simvastatin (ZOCOR) 80 MG tablet  Dispense: 90 tablet; Refill: 1  Serum lab work showed adequate control in December 2020, LDL was 85, total cholesterol 159.  Continue with same dose of statin for now.  He does smoke 2 cigars while golfing each week    Screening for prostate cancer    - PSA, Annual Screen (Prostatic-Specific Antigen)      Review of external notes as documented in note  38 minutes spent on the date of the encounter doing chart review, review of test results, interpretation of tests, patient visit and documentation        Tobacco Cessation:   reports that he has been smoking. He has never used smokeless tobacco.  The following high BMI interventions were performed this visit: encouragement to exercise and weight monitoring  I have counseled the patient for tobacco cessation and the follow up will occur  at the next visit.  BMI:   Estimated body mass index is 33.57 kg/m  as calculated from the following:    Height as of this encounter: 6' (1.829 m).    Weight as of this encounter: 247 lb 8 oz (112.3 kg).       Return in about 6 months (around 1/1/2022) for hyperlipidemia, hypertension, diabetes, ANA.    Imelda Beatty NP  [facility]   Ashutosh Vieira is 66 y.o. and presents today for the following health issues: med check   HPI Type 2 diabetes: Not insulin-dependent.  Current medications include Metformin 1000 mg twice daily, glipizide 5 mg twice daily.  He has lost 12 pounds since his last office visit.  He is staying active now during the summer months.  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  He is using his palatine as he is able to when he is home.  He does golf every Saturday morning with his friends.  He did bring his last week's worth of glucose results.  Fasting results have been ranging from 128 - 170.  Glucose levels prior to dinner have been ranging between 118 -171.  He denies any numbness or tingling in the feet.  He is up-to-date with vision screening.  He denies any urinary changes.  He is not insulin-dependent, he denies any hypoglycemic events, he is not seeing an endocrinologist.    Hypertension/hyperlipidemia: Blood pressure borderline.  Current medications include amlodipine 10 mg daily chlorothiazide 25 mg daily, losartan 100 mg daily and metoprolol 25 mg daily.  He has no history of coronary artery disease or previous stroke.  He is on a statin at this time but does not take aspirin.  He denies injecting, shortness papillotome swelling or dizziness today.  He does smoke 2 cigars every Saturday when golfing.    He would like his PSA level checked today since he did not complete his annual physical last year due to Covid.  He denies any urinary retention, difficulty with getting his stream started or any rectal pain today.          Review of Systems        Objective    /88 (Patient Site: Left Arm, Patient Position: Sitting, Cuff Size: Adult Large)   Pulse 77   Resp 16   Ht 6' (1.829 m)   Wt (!) 247 lb 8 oz (112.3 kg)   SpO2 95%   BMI 33.57 kg/m    Body mass index is 33.57 kg/m .  Physical Exam      Review of Systems     Denies fever, chills, visual changes, fatigue, myalgias, nasal congestion, rhinorrhea, ear pain, or discharge, sore throat, swollen glands,  abdominal pain, cough, shortness of breath, chest pain, weight change, change in bowel habits, melena, rectal bleeding, dysuria, frequency, urgency, hematuria, polyuria, polydipsia, polyphagia, joint pain or swelling or erythema, edema, rash, weakness, paresthesias,   bleeding or mood changes.       Objective:         /88 (Patient Site: Left Arm, Patient Position: Sitting, Cuff Size: Adult Large)   Pulse 77   Resp 16   Ht 6' (1.829 m)   Wt (!) 247 lb 8 oz (112.3 kg)   SpO2 95%   BMI 33.57 kg/m       Physical Exam:  General Appearance: Alert, cooperative, no distress, appears stated age  Head: Normocephalic, without obvious abnormality, atraumatic  Eyes: PERRL, conjunctiva/corneas clear, EOM's intact  Neck: Supple, symmetrical, trachea midline, no adenopathy;  thyroid: not enlarged, symmetric, no tenderness/mass/nodules; no carotid bruit or JVD  Lungs: Clear to auscultation bilaterally, respirations unlabored  Heart: Regular rate and rhythm, S1 and S2 normal, no murmur, rub, or gallop  Abdomen: Soft, non-tender, bowel sounds active all four quadrants,  no masses, no organomegaly  Extremities: Extremities normal, atraumatic, no cyanosis or edema.  Feet inspected, patient has full sensation.  No signs of callus formation or open wounds on the feet.  Skin is dry the heels and on the sides of the great toes.  Nails are in great condition.  Skin: Skin color, texture, turgor normal, no rashes or lesions  Neurologic: Normal

## 2021-07-05 PROBLEM — E66.01 MORBID OBESITY (H): Status: RESOLVED | Noted: 2020-12-31 | Resolved: 2021-07-01

## 2021-07-05 PROBLEM — M25.552 HIP PAIN, LEFT: Status: RESOLVED | Noted: 2020-01-09 | Resolved: 2021-06-30

## 2021-07-06 VITALS
HEIGHT: 72 IN | WEIGHT: 247.5 LBS | SYSTOLIC BLOOD PRESSURE: 134 MMHG | OXYGEN SATURATION: 95 % | DIASTOLIC BLOOD PRESSURE: 88 MMHG | RESPIRATION RATE: 16 BRPM | BODY MASS INDEX: 33.52 KG/M2 | HEART RATE: 77 BPM

## 2021-08-01 ENCOUNTER — HEALTH MAINTENANCE LETTER (OUTPATIENT)
Age: 66
End: 2021-08-01

## 2021-08-03 PROBLEM — E66.01 MORBID OBESITY (H): Status: RESOLVED | Noted: 2020-12-31 | Resolved: 2021-07-01

## 2021-09-26 ENCOUNTER — HEALTH MAINTENANCE LETTER (OUTPATIENT)
Age: 66
End: 2021-09-26

## 2021-10-29 ENCOUNTER — IMMUNIZATION (OUTPATIENT)
Dept: NURSING | Facility: CLINIC | Age: 66
End: 2021-10-29
Payer: COMMERCIAL

## 2021-10-29 PROCEDURE — 91300 PR COVID VAC PFIZER DIL RECON 30 MCG/0.3 ML IM: CPT

## 2021-10-29 PROCEDURE — 90471 IMMUNIZATION ADMIN: CPT

## 2021-10-29 PROCEDURE — 90686 IIV4 VACC NO PRSV 0.5 ML IM: CPT

## 2021-10-29 PROCEDURE — 0004A PR COVID VAC PFIZER DIL RECON 30 MCG/0.3 ML IM: CPT

## 2021-11-21 ENCOUNTER — HEALTH MAINTENANCE LETTER (OUTPATIENT)
Age: 66
End: 2021-11-21

## 2021-12-28 ENCOUNTER — OFFICE VISIT (OUTPATIENT)
Dept: FAMILY MEDICINE | Facility: CLINIC | Age: 66
End: 2021-12-28
Payer: COMMERCIAL

## 2021-12-28 VITALS
OXYGEN SATURATION: 98 % | WEIGHT: 248 LBS | DIASTOLIC BLOOD PRESSURE: 86 MMHG | BODY MASS INDEX: 33.63 KG/M2 | SYSTOLIC BLOOD PRESSURE: 128 MMHG | HEART RATE: 69 BPM

## 2021-12-28 DIAGNOSIS — Z13.220 SCREENING FOR HYPERLIPIDEMIA: ICD-10-CM

## 2021-12-28 DIAGNOSIS — I10 ESSENTIAL HYPERTENSION: ICD-10-CM

## 2021-12-28 DIAGNOSIS — H93.13 TINNITUS, BILATERAL: Primary | ICD-10-CM

## 2021-12-28 DIAGNOSIS — E78.49 FAMILIAL COMBINED HYPERLIPIDEMIA: ICD-10-CM

## 2021-12-28 DIAGNOSIS — E11.9 TYPE 2 DIABETES MELLITUS WITHOUT COMPLICATION, WITHOUT LONG-TERM CURRENT USE OF INSULIN (H): ICD-10-CM

## 2021-12-28 DIAGNOSIS — Z23 NEED FOR SHINGLES VACCINE: ICD-10-CM

## 2021-12-28 LAB
CHOLEST SERPL-MCNC: 157 MG/DL
CREAT UR-MCNC: 75 MG/DL
FASTING STATUS PATIENT QL REPORTED: ABNORMAL
HBA1C MFR BLD: 7.3 % (ref 0–5.6)
HDLC SERPL-MCNC: 37 MG/DL
LDLC SERPL CALC-MCNC: 91 MG/DL
MICROALBUMIN UR-MCNC: 12.39 MG/DL (ref 0–1.99)
MICROALBUMIN/CREAT UR: 165.2 MG/G CR
TRIGL SERPL-MCNC: 145 MG/DL

## 2021-12-28 PROCEDURE — 82043 UR ALBUMIN QUANTITATIVE: CPT | Performed by: FAMILY MEDICINE

## 2021-12-28 PROCEDURE — 80061 LIPID PANEL: CPT | Performed by: FAMILY MEDICINE

## 2021-12-28 PROCEDURE — 83036 HEMOGLOBIN GLYCOSYLATED A1C: CPT | Performed by: FAMILY MEDICINE

## 2021-12-28 PROCEDURE — 99214 OFFICE O/P EST MOD 30 MIN: CPT | Mod: 25 | Performed by: FAMILY MEDICINE

## 2021-12-28 PROCEDURE — 90471 IMMUNIZATION ADMIN: CPT | Performed by: FAMILY MEDICINE

## 2021-12-28 PROCEDURE — 90750 HZV VACC RECOMBINANT IM: CPT | Performed by: FAMILY MEDICINE

## 2021-12-28 PROCEDURE — 36415 COLL VENOUS BLD VENIPUNCTURE: CPT | Performed by: FAMILY MEDICINE

## 2021-12-28 RX ORDER — SIMVASTATIN 80 MG
80 TABLET ORAL EVERY EVENING
Qty: 90 TABLET | Refills: 3 | Status: SHIPPED | OUTPATIENT
Start: 2021-12-28 | End: 2022-09-08

## 2021-12-28 RX ORDER — GLIPIZIDE 5 MG/1
5 TABLET ORAL
Qty: 180 TABLET | Refills: 3 | Status: SHIPPED | OUTPATIENT
Start: 2021-12-28 | End: 2022-09-08

## 2021-12-28 RX ORDER — METOPROLOL SUCCINATE 25 MG/1
25 TABLET, EXTENDED RELEASE ORAL DAILY
Qty: 90 TABLET | Refills: 3 | Status: SHIPPED | OUTPATIENT
Start: 2021-12-28 | End: 2022-09-08

## 2021-12-28 RX ORDER — LOSARTAN POTASSIUM 100 MG/1
100 TABLET ORAL DAILY
Qty: 90 TABLET | Refills: 3 | Status: SHIPPED | OUTPATIENT
Start: 2021-12-28 | End: 2022-09-08

## 2021-12-28 RX ORDER — AMLODIPINE BESYLATE 10 MG/1
10 TABLET ORAL DAILY
Qty: 90 TABLET | Refills: 3 | Status: SHIPPED | OUTPATIENT
Start: 2021-12-28 | End: 2022-09-08

## 2021-12-28 RX ORDER — HYDROCHLOROTHIAZIDE 25 MG/1
25 TABLET ORAL DAILY
Qty: 90 TABLET | Refills: 3 | Status: SHIPPED | OUTPATIENT
Start: 2021-12-28 | End: 2022-09-08

## 2021-12-28 NOTE — PATIENT INSTRUCTIONS
Please try to pursue improvements in regular dietary intake to help improve your blood sugars.   We'll check labs today to help gauge the current diabetes control.    Please let me know if you'd like to visit with the diabetes educator.     I placed a referral for you to see audiology at the [facility]     I sent in refills for your medications to the pharmacy.      We'll update your shingles vaccine today.

## 2021-12-30 ENCOUNTER — TELEPHONE (OUTPATIENT)
Dept: FAMILY MEDICINE | Facility: CLINIC | Age: 66
End: 2021-12-30
Payer: COMMERCIAL

## 2021-12-30 DIAGNOSIS — E78.00 PURE HYPERCHOLESTEROLEMIA: Primary | ICD-10-CM

## 2021-12-30 RX ORDER — LOSARTAN POTASSIUM 50 MG/1
TABLET ORAL
Qty: 60 TABLET | Refills: 1 | Status: SHIPPED | OUTPATIENT
Start: 2021-12-30 | End: 2022-09-07

## 2021-12-30 NOTE — TELEPHONE ENCOUNTER
Losartan 100 mg is currently on back order pharm requesting RX for 50 mg tablets in the mean time.     Pended and FWD for approval.

## 2021-12-30 NOTE — TELEPHONE ENCOUNTER
rx for losartan 50mg tablets (2 tablets po daily) sent to pharmacy as requested.   Cole Barksdale MD

## 2022-01-24 ENCOUNTER — OFFICE VISIT (OUTPATIENT)
Dept: OTOLARYNGOLOGY | Facility: CLINIC | Age: 67
End: 2022-01-24
Attending: FAMILY MEDICINE

## 2022-01-24 ENCOUNTER — OFFICE VISIT (OUTPATIENT)
Dept: AUDIOLOGY | Facility: CLINIC | Age: 67
End: 2022-01-24
Attending: FAMILY MEDICINE
Payer: COMMERCIAL

## 2022-01-24 DIAGNOSIS — H90.3 SENSORINEURAL HEARING LOSS (SNHL) OF BOTH EARS: ICD-10-CM

## 2022-01-24 DIAGNOSIS — L43.9 LICHEN PLANUS: Primary | ICD-10-CM

## 2022-01-24 DIAGNOSIS — H90.3 SENSORINEURAL HEARING LOSS (SNHL) OF BOTH EARS: Primary | ICD-10-CM

## 2022-01-24 DIAGNOSIS — H93.13 TINNITUS, BILATERAL: ICD-10-CM

## 2022-01-24 PROCEDURE — 99207 PR NO CHARGE LOS: CPT | Performed by: AUDIOLOGIST

## 2022-01-24 PROCEDURE — 99213 OFFICE O/P EST LOW 20 MIN: CPT | Performed by: OTOLARYNGOLOGY

## 2022-01-24 PROCEDURE — 92557 COMPREHENSIVE HEARING TEST: CPT | Performed by: AUDIOLOGIST

## 2022-01-24 PROCEDURE — 92567 TYMPANOMETRY: CPT | Performed by: AUDIOLOGIST

## 2022-01-24 RX ORDER — TRIAMCINOLONE ACETONIDE 0.1 %
PASTE (GRAM) DENTAL
Qty: 15 G | Refills: 1 | Status: SHIPPED | OUTPATIENT
Start: 2022-01-24 | End: 2022-09-07

## 2022-01-24 NOTE — PATIENT INSTRUCTIONS
Kenalog paste as directed  Return visit 4 weeks      Patient Education     Tinnitus (Ringing in the Ears)     Treatment may include maskers and hearing aids.   Tinnitus is the term for a noise in your ear not caused by an outside sound. The noise might be a ringing, clicking, hiss, or roar. It can vary in pitch. It may be soft or very loud. For some people, this is a minor problem. But for others, the noise can make it hard to hear, work, and even sleep. When tinnitus can't be cured, treatments may help.  What causes tinnitus?  Loud noises, hearing loss, and earwax can cause tinnitus. So can certain medicines. Large amounts of aspirin or caffeine are sometimes to blame. In many cases, the exact cause of tinnitus is not known.  How is tinnitus treated?  Finding and removing the cause is the best way to treat tinnitus. So your healthcare provider may refer you to an ear, nose, and throat doctor (ENT or otolaryngologist). Your hearing may also be checked by a hearing specialist (audiologist). If you have hearing loss, wearing a hearing aid may help your tinnitus. When the cause can't be found, the tinnitus itself may be treated. Some of the treatments are listed below. Your healthcare provider can tell you more about them:    Maskers. These are small devices that look like hearing aids. They have a pleasant sound that helps cover up the ringing in your ears. Hearing aids and maskers are sometimes used together.    Medicines that treat anxiety and depression. These may ease tinnitus in some people.    Hypnosis or relaxation therapy. This may help head noise seem less severe.    Tinnitus retraining therapy. This combines counseling and maskers. Both can help take your mind off the tinnitus.  To learn more    American Speech-Hearing-Language Association 930-182-3964 www.tahira.org    American Tinnitus Association 071-643-8180 www.tom.org    National [place] on Deafness and other Communication Disorders 832-682-9488  www.nidcd.nih.gov/    Bing last reviewed this educational content on 9/1/2019 2000-2021 [employer]. All rights reserved. This information is not intended as a substitute for professional medical care. Always follow your healthcare professional's instructions.

## 2022-01-24 NOTE — LETTER
1/24/2022         RE: Ashutosh Vieira  [address] [place] [id number]        Dear Colleague,    Thank you for referring your patient, Ashutosh Vieira, to the [facility]. Please see a copy of my visit note below.    CHIEF COMPLAINT:   Diagnoses       Codes Comments    Tinnitus, bilateral     H93.13              HISTORY OF PRESENT ILLNESS    Ashutosh was seen at the behest of Cole Barksdale MD for tinnitus   Diagnoses       Codes Comments    Tinnitus, bilateral     H93.13       Patient has a longstanding high-pitched tinnitus in both ears for many years.  Over the last 6 months he feels like this is becoming more noticeable.  Normally his tinnitus is masked by abnormal background noise or conversation.  He is not have any dizziness or balance issues.  No significant history of noise exposure history no history of otologic surgery or otologic disease.         REVIEW OF SYSTEMS    Review of Systems as per HPI and PMHx, otherwise 10 system review system are negative.     Lisinopril     There were no vitals taken for this visit.    HEAD: Normal appearance and symmetry:  No cutaneous lesions.      NECK:  supple     EARS: normal TM, EACs     NOSE:     Dorsum:   straight  Septum:  midline  Mucosa:  moist  Inferior turbinates:  wnl        ORAL CAVITY/OROPHARYNX:     Lips:  Normal.  Tongue: normal, midline  Mucosa:    Small.  Of lichen planus/fibrosis on both retromolar trigone areas.  This may be due to irritation from his molars.  See attached photo  Tonsils:  1+             NECK:  Trachea:  midline.              Thyroid:  normal              Adenopathy:  none        NEURO:   Alert and Oriented     GAIT AND STATION:  normal     RESPIRATORY:   Symmetry and Respiratory effort     PSYCH:  Normal mood and affect     SKIN:   warm and dry     Audiogram is consistent with prior noise exposure with a small 4K notch.  Work of word recognition is preserved    IMPRESSION:    Encounter Diagnoses   Name  Primary?     Tinnitus, bilateral      Lichen planus Yes     Discussed the relationship tinnitus with hearing loss.  Discussed masking strategies with the patient.  He is instructed that the tinnitus itself is not is a benign process and not harmful and is simply a symptom of his hearing loss.     RECOMMENDATIONS:    Trial of kenalog paste  Masking strategies  Return visit 4 weeks for recheck of oral cavity       Again, thank you for allowing me to participate in the care of your patient.        Sincerely,        Sunny Snyder MD

## 2022-01-24 NOTE — PROGRESS NOTES
CHIEF COMPLAINT:   Diagnoses       Codes Comments    Tinnitus, bilateral     H93.13              HISTORY OF PRESENT ILLNESS    Ashutosh was seen at the behest of Cole Barksdale MD for tinnitus   Diagnoses       Codes Comments    Tinnitus, bilateral     H93.13       Patient has a longstanding high-pitched tinnitus in both ears for many years.  Over the last 6 months he feels like this is becoming more noticeable.  Normally his tinnitus is masked by abnormal background noise or conversation.  He is not have any dizziness or balance issues.  No significant history of noise exposure history no history of otologic surgery or otologic disease.         REVIEW OF SYSTEMS    Review of Systems as per HPI and PMHx, otherwise 10 system review system are negative.     Lisinopril     There were no vitals taken for this visit.    HEAD: Normal appearance and symmetry:  No cutaneous lesions.      NECK:  supple     EARS: normal TM, EACs     NOSE:     Dorsum:   straight  Septum:  midline  Mucosa:  moist  Inferior turbinates:  wnl        ORAL CAVITY/OROPHARYNX:     Lips:  Normal.  Tongue: normal, midline  Mucosa:    Small.  Of lichen planus/fibrosis on both retromolar trigone areas.  This may be due to irritation from his molars.  See attached photo  Tonsils:  1+             NECK:  Trachea:  midline.              Thyroid:  normal              Adenopathy:  none        NEURO:   Alert and Oriented     GAIT AND STATION:  normal     RESPIRATORY:   Symmetry and Respiratory effort     PSYCH:  Normal mood and affect     SKIN:   warm and dry     Audiogram is consistent with prior noise exposure with a small 4K notch.  Work of word recognition is preserved    IMPRESSION:    Encounter Diagnoses   Name Primary?     Tinnitus, bilateral      Lichen planus Yes     Discussed the relationship tinnitus with hearing loss.  Discussed masking strategies with the patient.  He is instructed that the tinnitus itself is not is a benign process and not harmful  and is simply a symptom of his hearing loss.     RECOMMENDATIONS:    Trial of kenalog paste  Masking strategies  Return visit 4 weeks for recheck of oral cavity

## 2022-01-24 NOTE — PROGRESS NOTES
AUDIOLOGY REPORT    SUMMARY: Audiology visit completed. See audiogram for results.      RECOMMENDATIONS: Follow-up with ENT.    Man Richard, CCC-A  [place] Licensed Audiologist #[id number]

## 2022-03-11 NOTE — PROGRESS NOTES
Assessment & Plan     Ashutosh was seen today for recheck medication.    Diagnoses and all orders for this visit:    Tinnitus, bilateral  -     Adult Audiology Referral; Future    Essential hypertension  -     amLODIPine (NORVASC) 10 MG tablet; Take 1 tablet (10 mg) by mouth daily  -     hydrochlorothiazide (HYDRODIURIL) 25 MG tablet; Take 1 tablet (25 mg) by mouth daily  -     losartan (COZAAR) 100 MG tablet; Take 1 tablet (100 mg) by mouth daily  -     metoprolol succinate ER (TOPROL-XL) 25 MG 24 hr tablet; Take 1 tablet (25 mg) by mouth daily    Type 2 diabetes mellitus without complication, without long-term current use of insulin (H)  -     HEMOGLOBIN A1C; Future  -     Lipid panel reflex to direct LDL Fasting; Future  -     Albumin Random Urine Quantitative with Creat Ratio; Future  -     glipiZIDE (GLUCOTROL) 5 MG tablet; Take 1 tablet (5 mg) by mouth 2 times daily (before meals)  -     metFORMIN (GLUCOPHAGE) 1000 MG tablet; Take 1 tablet (1,000 mg) by mouth 2 times daily (with meals)  -     HEMOGLOBIN A1C  -     Lipid panel reflex to direct LDL Fasting  -     Albumin Random Urine Quantitative with Creat Ratio    Familial combined hyperlipidemia  -     simvastatin (ZOCOR) 80 MG tablet; Take 1 tablet (80 mg) by mouth every evening    Screening for HIV (human immunodeficiency virus)    Screening for hyperlipidemia    Need for shingles vaccine  -     ZOSTER VACCINE RECOMBINANT ADJUVANTED IM NJX    Other orders  -     REVIEW OF HEALTH MAINTENANCE PROTOCOL ORDERS      Patient Instructions   I'm really glad to see that you've maintained the weight loss you had achieved this past spring and summer.  Please try to pursue more frequent small meals/snacks to help achieve more steady/stable blood sugars.     We'll check labs today to help gauge the current diabetes control.      Please let me know if you'd like to visit with the diabetes educator.     I placed a referral for you to see audiology/ENT at the [facility] for the increasing ringing in the ears that you've been noticing.     I sent in refills for your chronic medications to the pharmacy.      We'll update your shingles vaccine today.      38 minutes spent on the date of the encounter doing chart review, history and exam, documentation and further activities per the note      Tobacco Cessation:   reports that he has been smoking. He has never used smokeless tobacco.  Tobacco Cessation Action Plan: Information offered: Patient not interested at this time    BMI:   Estimated body mass index is 33.63 kg/m  as calculated from the following:    Height as of 7/1/21: 1.829 m (6').    Weight as of this encounter: 112.5 kg (248 lb).   Weight management plan: Discussed healthy diet and exercise guidelines    Return in about 6 months (around 6/28/2022) for Follow up.    Cole Barksdale MD  [facility] ANNMARIE Boykin is a 66 year old who presents for the following health issues     HPI     Diabetes Follow-up      How often are you checking your blood sugar? Patient has been checking sugars irregularly    What concerns do you have today about your diabetes? Patient notes that his sugar levels have been variable sometimes close to 200 before dinner. Notes that he will sometimes have prolonged periods without food intake during daytime.       Do you have any of these symptoms? (Select all that apply)  No numbness or tingling in feet.  No redness, sores or blisters on feet.  No complaints of excessive thirst.  No reports of blurry vision.  No significant changes to weight.    Have you had a diabetic eye exam in the last 12 months? Yes    Hyperlipidemia Follow-Up      Are you regularly taking any medication or supplement to lower your cholesterol?   Yes-  simvastatin    Are you having muscle aches or other side effects that you think could be caused by your cholesterol lowering medication?  No    Hypertension Follow-up      Do you check your  blood pressure regularly outside of the clinic? No     Are you following a low salt diet? Yes    Are your blood pressures ever more than 140 on the top number (systolic) OR more   than 90 on the bottom number (diastolic), for example 140/90? No    BP Readings from Last 2 Encounters:   12/28/21 128/86   07/01/21 134/88     Hemoglobin A1C (%)   Date Value   12/28/2021 7.3 (H)   07/01/2021 7.0 (H)     LDL Cholesterol Calculated (mg/dL)   Date Value   12/28/2021 91   12/31/2020 85     LDL Cholesterol Direct   Date Value   07/08/2016 96 mg/dl   05/25/2012 113 mg/dL      Patient also reports increase in tinnitus noted over the course of the past several months. He thinks that his hearing acuity may also be worsenintg.  It is not necessarily impacting his work right now. No associated ear pains.      Review of Systems   No chest pain, palpitations, shortness of breath, or peripheral swelling.       Objective    /86 (BP Location: Right arm, Patient Position: Sitting, Cuff Size: Adult Regular)   Pulse 69   Wt 112.5 kg (248 lb)   SpO2 98%   BMI 33.63 kg/m    Body mass index is 33.63 kg/m .  Physical Exam   GENERAL: healthy, alert and no distress  EYES: Eyes grossly normal to inspection, PERRL and conjunctivae and sclerae normal  HENT: ear canals and TM's normal, nose and mouth without ulcers or lesions  RESP: lungs clear to auscultation - no rales, rhonchi or wheezes  CV: regular rate and rhythm, normal S1 S2, no S3 or S4, no murmur, click or rub, no peripheral edema and peripheral pulses strong  NEURO: Normal strength and tone, mentation intact and speech normal  Diabetic foot exam: normal DP and PT pulses, no trophic changes or ulcerative lesions and normal sensory exam  PSYCH: mentation appears normal, affect normal/bright    Component      Latest Ref Rng & Units 12/31/2020 7/1/2021 12/28/2021   Cholesterol      <=199 mg/dL 159  157   Triglycerides      <=149 mg/dL 175 (H)  145   HDL Cholesterol      >=40 mg/dL  39 (L)  37 (L)   LDL Cholesterol Calculated      <=129 mg/dL 85  91   Patient Fasting > 8hrs?       Yes  Unknown   Microalbumin Urine mg/dL      0.00 - 1.99 mg/dL 14.20 (H)  12.39 (H)   Creatinine, Urine      mg/dL 96.2     Microalbumin Urine mg/g Cr      <=19.9 mg/g Cr 147.6 (H)  165.2 (H)   Creatinine Urine      mg/dL   75   Hemoglobin A1C      0.0 - 5.6 %  7.0 (H) 7.3 (H)

## 2022-04-15 ENCOUNTER — TRANSFERRED RECORDS (OUTPATIENT)
Dept: HEALTH INFORMATION MANAGEMENT | Facility: CLINIC | Age: 67
End: 2022-04-15
Payer: COMMERCIAL

## 2022-05-08 ENCOUNTER — HEALTH MAINTENANCE LETTER (OUTPATIENT)
Age: 67
End: 2022-05-08

## 2022-05-31 ENCOUNTER — ANCILLARY PROCEDURE (OUTPATIENT)
Dept: GENERAL RADIOLOGY | Facility: CLINIC | Age: 67
End: 2022-05-31
Attending: PHYSICIAN ASSISTANT
Payer: COMMERCIAL

## 2022-05-31 ENCOUNTER — OFFICE VISIT (OUTPATIENT)
Dept: FAMILY MEDICINE | Facility: CLINIC | Age: 67
End: 2022-05-31

## 2022-05-31 VITALS
BODY MASS INDEX: 34.2 KG/M2 | OXYGEN SATURATION: 96 % | WEIGHT: 252.2 LBS | SYSTOLIC BLOOD PRESSURE: 130 MMHG | DIASTOLIC BLOOD PRESSURE: 88 MMHG | HEART RATE: 70 BPM

## 2022-05-31 DIAGNOSIS — M25.561 ACUTE PAIN OF RIGHT KNEE: Primary | ICD-10-CM

## 2022-05-31 DIAGNOSIS — M25.561 ACUTE PAIN OF RIGHT KNEE: ICD-10-CM

## 2022-05-31 PROCEDURE — 99213 OFFICE O/P EST LOW 20 MIN: CPT | Performed by: PHYSICIAN ASSISTANT

## 2022-05-31 PROCEDURE — 73562 X-RAY EXAM OF KNEE 3: CPT | Mod: TC | Performed by: RADIOLOGY

## 2022-05-31 ASSESSMENT — ENCOUNTER SYMPTOMS
CONSTITUTIONAL NEGATIVE: 1
CARDIOVASCULAR NEGATIVE: 1
NEUROLOGICAL NEGATIVE: 1
RESPIRATORY NEGATIVE: 1

## 2022-05-31 ASSESSMENT — PAIN SCALES - GENERAL: PAINLEVEL: MODERATE PAIN (5)

## 2022-05-31 NOTE — PROGRESS NOTES
Progress Note  5/31/22     Chief Complaint   Patient presents with     Knee Pain     R knee injury 5/21/22.            HPI  Ashutosh Vieira is a pleasant  67 year old year old male  who present to the clinic today for evaluation on pain and swelling to the right knee.  On 5/21/2022 he was golfing went to get into the golf cart and the  took off he fell out of the golf cart and landed on his right knee.  Approximately 5 minutes after the incident he developed significant swelling and pain over the right knee.  The swelling since the incident has improved but he continues to have pain over the patella.  She also has some ecchymosis to the medial aspect that extends posteriorly.  Pain is worse with extension of the knee and with sitting for long periods of time.  No pain to the lower leg or numbness or tingling in the toes.  He has been icing resting and taking Aleve.  He does have some mild tenderness over the patella to the superior medial aspect.  No pain to the quadricep muscle.  Overall the pain and swelling has improved.    ROS    Review of Systems   Constitutional: Negative.    Respiratory: Negative.    Cardiovascular: Negative.    Musculoskeletal:        Right knee pain, swelling, and bruising.  Pain is worse with flexion greater than 90 degrees.  No redness or warmth noted.  No pain to the lower thigh or her the rest of the right lower extremity.   Neurological: Negative.             Patient Active Problem List   Diagnosis     Essential Hypercholesterolemia     Obesity     Obstructive Sleep Apnea     Essential Hypertension     Benign Adenomatous Polyp Of The Large Intestine     Allergic Rhinitis     Diabetes mellitus (H)     Tinnitus     Familial combined hyperlipidemia        Past Medical History:   Diagnosis Date     Allergic rhinitis      Allergic rhinitis      Diabetes mellitus (H)      Diabetes mellitus (H)      Erectile dysfunction      Erectile dysfunction      Hyperlipemia      Hyperlipemia       Hypertension      Hypertension      Obesity (BMI 35.0-39.9 without comorbidity)      Obesity (BMI 35.0-39.9 without comorbidity)      ANA (obstructive sleep apnea)      ANA (obstructive sleep apnea)      Plantar fasciitis      Plantar fasciitis      Prepatellar bursitis      Prepatellar bursitis      Tinnitus      Tinnitus           Social History     Socioeconomic History     Marital status:      Spouse name: Not on file     Number of children: 2     Years of education: 16     Highest education level: Not on file   Occupational History     Not on file   Tobacco Use     Smoking status: Current Some Day Smoker     Smokeless tobacco: Never Used     Tobacco comment: cigars   Substance and Sexual Activity     Alcohol use: Yes     Comment: Alcoholic Drinks/day: weekends     Drug use: No     Sexual activity: Yes     Partners: Female   Other Topics Concern     Not on file   Social History Narrative    Travels weekly for work as a .      Social Determinants of Health     Financial Resource Strain: Not on file   Food Insecurity: Not on file   Transportation Needs: Not on file   Physical Activity: Not on file   Stress: Not on file   Social Connections: Not on file   Intimate Partner Violence: Not on file   Housing Stability: Not on file           Allergies   Allergen Reactions     Lisinopril Cough          Current Outpatient Medications   Medication     amLODIPine (NORVASC) 10 MG tablet     glipiZIDE (GLUCOTROL) 5 MG tablet     hydrochlorothiazide (HYDRODIURIL) 25 MG tablet     losartan (COZAAR) 100 MG tablet     metFORMIN (GLUCOPHAGE) 1000 MG tablet     metoprolol succinate ER (TOPROL-XL) 25 MG 24 hr tablet     simvastatin (ZOCOR) 80 MG tablet     ACCU-CHEK SMARTVIEW TEST STRIP strips     lancets (ACCU-CHEK MULTICLIX LANCET) Misc     losartan (COZAAR) 50 MG tablet     triamcinolone (KENALOG) 0.1 % paste     No current facility-administered medications for this visit.            /88   Pulse 70    Wt 114.4 kg (252 lb 3.2 oz)   SpO2 96%   BMI 34.20 kg/m       No results found for this or any previous visit (from the past 240 hour(s)).       No flowsheet data found.     Physical Exam:     Physical Exam  Vitals and nursing note reviewed.   Constitutional:       General: He is awake. He is not in acute distress.     Appearance: Normal appearance. He is well-developed and well-groomed. He is not ill-appearing, toxic-appearing or diaphoretic.   Cardiovascular:      Rate and Rhythm: Normal rate and regular rhythm.      Heart sounds: No murmur heard.    No friction rub. No gallop.   Pulmonary:      Effort: Pulmonary effort is normal. No accessory muscle usage, prolonged expiration or respiratory distress.      Breath sounds: Normal breath sounds. No decreased breath sounds, wheezing, rhonchi or rales.   Musculoskeletal:      Right knee: Swelling, effusion, erythema and ecchymosis present. No deformity. Normal range of motion. Tenderness (patella) present. No medial joint line, lateral joint line, MCL, LCL, ACL or PCL tenderness.      Right lower leg: No edema.      Left lower leg: No edema.      Comments: Right knee: Swelling/effusion to the medial aspect of the right knee with some ecchymosis medially that extends posteriorly.  No warmth or redness noted to the knee.  Pain to palpation to the medial superior aspect of the patella.  Full range of motion but does elicit pain with extension past 90 degrees.  Weakness to the lower extremity.  Negative vergus.  Negative valgus.   Neurological:      Mental Status: He is alert.   Psychiatric:         Behavior: Behavior is cooperative.              Assessment/Plan:       ICD-10-CM    1. Screening for HIV (human immunodeficiency virus)  Z11.4    2. Acute pain of right knee  M25.561 XR Knee Right 3 Views   3. Encounter for immunization  Z23 MR Knee Right w/o Contrast     CANCELED: ZOSTER VACCINE RECOMBINANT ADJUVANTED IM NJX       #1 right knee pain- he fell out of a golf  cart on 5/21/2022 and landed on his right knee.  Since then he has been having pain and swelling over the lateral aspect.  He has pain to the superior aspect of the patella and some ecchymosis that is in the medial aspect that he gets posteriorly.  He does have some mild posterior knee pain.  Majority of his pain is with extension past 90 degrees and sitting for long periods of time.  No pain to the lower leg no calf pain.  No pain to the quadricep muscle or the lower thigh.  X-ray done today which was negative for any fracture or dislocation but does show some significant swelling to the lateral aspect that may be pushing the patella just medially a bit.  X-ray report is still pending.  If there is any other findings per radiology report we will let him know.  But no other underlying fractures noted.  We will have him continue with ice, rest, ibuprofen in the meantime.With the swelling, bruising, and injury we will also set him up for an MRI of his knee as well.  He is to monitor symptoms.  If symptoms worsen or do not improve he is to let us know.       Marty Teran PA-C           Answers for HPI/ROS submitted by the patient on 5/26/2022  How many servings of fruits and vegetables do you eat daily?: 0-1  On average, how many sweetened beverages do you drink each day (Examples: soda, juice, sweet tea, etc.  Do NOT count diet or artificially sweetened beverages)?: 0  How many minutes a day do you exercise enough to make your heart beat faster?: 9 or less  How many days a week do you exercise enough to make your heart beat faster?: 3 or less  How many days per week do you miss taking your medication?: 0  What is the reason for your visit today?: Right knee injury. Took heavy blow to the knee.  When did your symptoms begin?: 1-2 weeks ago  What are your symptoms?: Pain and selling bruising  How would you describe these symptoms?: Severe  Are your symptoms:: Improving  Have you had these symptoms before?: No  Is  there anything that makes you feel worse?: Bending knee more than sitting position.  Is there anything that makes you feel better?: Advil

## 2022-05-31 NOTE — PATIENT INSTRUCTIONS
Please call the radiology dep at [facility] to schedule your test today at [phone]

## 2022-06-10 ENCOUNTER — HOSPITAL ENCOUNTER (OUTPATIENT)
Dept: MRI IMAGING | Facility: CLINIC | Age: 67
Discharge: HOME | End: 2022-06-10
Attending: PHYSICIAN ASSISTANT | Admitting: PHYSICIAN ASSISTANT
Payer: COMMERCIAL

## 2022-06-10 PROCEDURE — 73721 MRI JNT OF LWR EXTRE W/O DYE: CPT | Mod: RT

## 2022-06-22 DIAGNOSIS — M25.561 ACUTE PAIN OF RIGHT KNEE: Primary | ICD-10-CM

## 2022-07-01 ENCOUNTER — IMMUNIZATION (OUTPATIENT)
Dept: NURSING | Facility: CLINIC | Age: 67
End: 2022-07-01
Payer: COMMERCIAL

## 2022-07-01 PROCEDURE — 0054A COVID-19,PF,PFIZER (12+ YRS): CPT

## 2022-07-01 PROCEDURE — 91305 COVID-19,PF,PFIZER (12+ YRS): CPT

## 2022-07-03 ENCOUNTER — HEALTH MAINTENANCE LETTER (OUTPATIENT)
Age: 67
End: 2022-07-03

## 2022-07-05 ENCOUNTER — TRANSFERRED RECORDS (OUTPATIENT)
Dept: HEALTH INFORMATION MANAGEMENT | Facility: CLINIC | Age: 67
End: 2022-07-05

## 2022-07-12 ENCOUNTER — TELEPHONE (OUTPATIENT)
Dept: FAMILY MEDICINE | Facility: CLINIC | Age: 67
End: 2022-07-12

## 2022-07-12 ENCOUNTER — ALLIED HEALTH/NURSE VISIT (OUTPATIENT)
Dept: FAMILY MEDICINE | Facility: CLINIC | Age: 67
End: 2022-07-12
Payer: COMMERCIAL

## 2022-07-12 DIAGNOSIS — Z23 NEED FOR SHINGLES VACCINE: Primary | ICD-10-CM

## 2022-07-12 PROCEDURE — 90471 IMMUNIZATION ADMIN: CPT

## 2022-07-12 PROCEDURE — 99207 PR NO CHARGE NURSE ONLY: CPT

## 2022-07-12 PROCEDURE — 90750 HZV VACC RECOMBINANT IM: CPT

## 2022-08-28 ENCOUNTER — HEALTH MAINTENANCE LETTER (OUTPATIENT)
Age: 67
End: 2022-08-28

## 2022-09-08 ENCOUNTER — OFFICE VISIT (OUTPATIENT)
Dept: FAMILY MEDICINE | Facility: CLINIC | Age: 67
End: 2022-09-08
Payer: COMMERCIAL

## 2022-09-08 ENCOUNTER — ANCILLARY PROCEDURE (OUTPATIENT)
Dept: GENERAL RADIOLOGY | Facility: CLINIC | Age: 67
End: 2022-09-08
Attending: NURSE PRACTITIONER
Payer: COMMERCIAL

## 2022-09-08 VITALS
OXYGEN SATURATION: 96 % | HEIGHT: 72 IN | SYSTOLIC BLOOD PRESSURE: 116 MMHG | BODY MASS INDEX: 33.43 KG/M2 | HEART RATE: 71 BPM | WEIGHT: 246.8 LBS | RESPIRATION RATE: 14 BRPM | DIASTOLIC BLOOD PRESSURE: 86 MMHG

## 2022-09-08 DIAGNOSIS — Z71.89 ACP (ADVANCE CARE PLANNING): ICD-10-CM

## 2022-09-08 DIAGNOSIS — Z87.891 HISTORY OF SMOKING: ICD-10-CM

## 2022-09-08 DIAGNOSIS — E78.49 FAMILIAL COMBINED HYPERLIPIDEMIA: ICD-10-CM

## 2022-09-08 DIAGNOSIS — I10 ESSENTIAL HYPERTENSION: ICD-10-CM

## 2022-09-08 DIAGNOSIS — M25.551 HIP PAIN, RIGHT: Primary | ICD-10-CM

## 2022-09-08 DIAGNOSIS — Z23 NEED FOR VACCINATION: ICD-10-CM

## 2022-09-08 DIAGNOSIS — M25.551 HIP PAIN, RIGHT: ICD-10-CM

## 2022-09-08 DIAGNOSIS — E11.9 TYPE 2 DIABETES MELLITUS WITHOUT COMPLICATION, WITHOUT LONG-TERM CURRENT USE OF INSULIN (H): ICD-10-CM

## 2022-09-08 DIAGNOSIS — Z13.6 SCREENING FOR AAA (ABDOMINAL AORTIC ANEURYSM): ICD-10-CM

## 2022-09-08 DIAGNOSIS — E78.00 PURE HYPERCHOLESTEROLEMIA: ICD-10-CM

## 2022-09-08 LAB — HBA1C MFR BLD: 7.1 % (ref 0–5.6)

## 2022-09-08 PROCEDURE — 83036 HEMOGLOBIN GLYCOSYLATED A1C: CPT | Performed by: NURSE PRACTITIONER

## 2022-09-08 PROCEDURE — 90662 IIV NO PRSV INCREASED AG IM: CPT | Performed by: NURSE PRACTITIONER

## 2022-09-08 PROCEDURE — 36415 COLL VENOUS BLD VENIPUNCTURE: CPT | Performed by: NURSE PRACTITIONER

## 2022-09-08 PROCEDURE — 80048 BASIC METABOLIC PNL TOTAL CA: CPT | Performed by: NURSE PRACTITIONER

## 2022-09-08 PROCEDURE — 99214 OFFICE O/P EST MOD 30 MIN: CPT | Mod: 25 | Performed by: NURSE PRACTITIONER

## 2022-09-08 PROCEDURE — 90471 IMMUNIZATION ADMIN: CPT | Performed by: NURSE PRACTITIONER

## 2022-09-08 PROCEDURE — 73502 X-RAY EXAM HIP UNI 2-3 VIEWS: CPT | Mod: TC | Performed by: RADIOLOGY

## 2022-09-08 RX ORDER — AMLODIPINE BESYLATE 10 MG/1
10 TABLET ORAL DAILY
Qty: 90 TABLET | Refills: 3 | Status: SHIPPED | OUTPATIENT
Start: 2022-09-08 | End: 2023-05-26

## 2022-09-08 RX ORDER — HYDROCHLOROTHIAZIDE 25 MG/1
25 TABLET ORAL DAILY
Qty: 90 TABLET | Refills: 3 | Status: SHIPPED | OUTPATIENT
Start: 2022-09-08 | End: 2023-03-15

## 2022-09-08 RX ORDER — SIMVASTATIN 80 MG
80 TABLET ORAL EVERY EVENING
Qty: 90 TABLET | Refills: 3 | Status: SHIPPED | OUTPATIENT
Start: 2022-09-08 | End: 2023-05-26

## 2022-09-08 RX ORDER — METOPROLOL SUCCINATE 25 MG/1
25 TABLET, EXTENDED RELEASE ORAL DAILY
Qty: 90 TABLET | Refills: 3 | Status: SHIPPED | OUTPATIENT
Start: 2022-09-08 | End: 2023-11-29

## 2022-09-08 RX ORDER — LOSARTAN POTASSIUM 100 MG/1
100 TABLET ORAL DAILY
Qty: 90 TABLET | Refills: 3 | Status: SHIPPED | OUTPATIENT
Start: 2022-09-08 | End: 2023-11-29

## 2022-09-08 RX ORDER — GLIPIZIDE 5 MG/1
5 TABLET ORAL
Qty: 180 TABLET | Refills: 3 | Status: SHIPPED | OUTPATIENT
Start: 2022-09-08 | End: 2023-05-26

## 2022-09-08 NOTE — PROGRESS NOTES
Assessment & Plan     Hip pain, right    - Orthopedic [facility] Referral  - XR Hip Right 2-3 Views  We will obtain some baseline x-rays today prior to referring him over to orthopedics.  He has had a total hip replacement completed of the left, he states the pain now in his right hip is very similar to the pain he experienced prior to his replacement.  In the meantime he will continue with activity as tolerated, Tylenol or ibuprofen for discomfort, ice or heat to the affected area    Type 2 diabetes mellitus without complication, without long-term current use of insulin (H)    - Hemoglobin A1c  He has not been great about checking his blood sugars so he will try to get back on track with checking these at minimum once daily when he is fasting.  He has been dealing with a lot of stress recently since his wife had a double mastectomy due to breast cancer that was found earlier this year.  He will try to get back on track with exercising, diet modifications and weight loss.  He has a difficult time at night with snacking so he will start modifying his diet as well  He reports no urinary changes, no signs of neuropathy on exam today  He is aware that I am leaving my practice so he will need to follow-up with a new provider to establish care in 6 months when his next medication check is due    Essential hypertension    - BASIC METABOLIC PANEL  - CT Coronary Calcium Scan  Blood pressure is well controlled today.  He is taking 4 medications at this time for managing his hypertension.  He does have an elevated stroke risk of 30.7%, he does meet the guidelines for CT calcium scan along with a AAA screening.  Orders were placed for both of those studies to be completed at [facility]  Medication refills will be sent to his primary pharmacy    Essential Hypercholesterolemia    - CT Coronary Calcium Scan  The 10-year ASCVD risk score (Erieh[place] GUAMAN Jr., et al., 2013) is: 30.7%    Values used to calculate the score:      Age: 67  years      Sex: Male      Is Non- : No      Diabetic: Yes      Tobacco smoker: Yes      Systolic Blood Pressure: 116 mmHg      Is BP treated: Yes      HDL Cholesterol: 37 mg/dL      Total Cholesterol: 157 mg/dL  Blood pressure is well controlled today.  He is taking 4 medications at this time for managing his hypertension.  He does have an elevated stroke risk of 30.7%, he does meet the guidelines for CT calcium scan along with a AAA screening.  Orders were placed for both of those studies to be completed at [facility]  Medication refills will be sent to his primary pharmacy  He is taking the maximum dose of the simvastatin at this time  Father has a known history of carotid artery stenosis which required endarterectomy for correction.    Screening for AAA (abdominal aortic aneurysm)    - Abdominal Aortic Aneurysm Screening/Tracking  - US Abdominal Aorta Limited  Patient has been a cigar smoker for 20+ years, he does qualify for AAA screening at this time    History of smoking    - US Abdominal Aorta Limited  He has no plans for cessation at this time, he only smokes cigars when [place] is on the golf [place]  He has been a cigar smoker now for over 20 years    Need for vaccination    - INFLUENZA, QUAD, HIGH DOSE, PF, 65YR + (FLUZONE HD)  He did declined the pneumococcal vaccine today    ACP (advance care planning)    Honoring choices packet given to patient for completion, he will return to the clinic once is notarized    [id number]}     BMI:   Estimated body mass index is 33.47 kg/m  as calculated from the following:    Height as of this encounter: 1.829 m (6').    Weight as of this encounter: 111.9 kg (246 lb 12.8 oz).   Weight management plan: Discussed healthy diet and exercise guidelines        Return in about 6 months (around 3/8/2023) for Follow up, in person, med check, fasting lab work, est care with new PCP.    Imelda Beatty NP  [facility] NATIVIDAD Boykin  is a 67 year old, presenting for the following health issues:  Musculoskeletal Problem (Right hip, needs referral for [facility].)      Musculoskeletal Problem    History of Present Illness       Reason for visit:  Right Hip Pain in Joint.  Symptom onset:  More than a month  Symptoms include:  Pain in joint when bend, walk or move.  Symptom intensity:  Severe  Symptom progression:  Worsening  Had these symptoms before:  Yes  What makes it worse:  Same symptoms I had for left hip prior to hip replacement.  What makes it better:  Advil    He eats 0-1 servings of fruits and vegetables daily.He consumes 0 sweetened beverage(s) daily.He exercises with enough effort to increase his heart rate 9 or less minutes per day.  He exercises with enough effort to increase his heart rate 3 or less days per week.   He is taking medications regularly.       Diabetes Follow-up      How often are you checking your blood sugar? Not at all    What concerns do you have today about your diabetes? None     Do you have any of these symptoms? (Select all that apply)  No numbness or tingling in feet.  No redness, sores or blisters on feet.  No complaints of excessive thirst.  No reports of blurry vision.  No significant changes to weight.    Have you had a diabetic eye exam in the last 12 months? No last exam was 11/2020, he is aware he is due    Rx: Metformin, Glipizide, no insulin    Meals per day: 1 to 2    Snacks per day: night time snacking daily    ETOH: 2 per week    Soda:  none    Urinary changes: no, he has been taking saw palmetto daily                Hyperlipidemia Follow-Up      Are you regularly taking any medication or supplement to lower your cholesterol?   Yes- Simvastatin 80 mg daily    Are you having muscle aches or other side effects that you think could be caused by your cholesterol lowering medication?  No   The 10-year ASCVD risk score ([place] ROWENA JrRony, et al., 2013) is: 30.7%    Values used to calculate the score:       Age: 67 years      Sex: Male      Is Non- : No      Diabetic: Yes      Tobacco smoker: Yes      Systolic Blood Pressure: 116 mmHg      Is BP treated: Yes      HDL Cholesterol: 37 mg/dL        Total Cholesterol: 157 mg/dL     He has been a cigar smoker for 20 years now, his blood pressure has been well controlled but he does require 4 different medications to control his blood pressure, he is eligible for AAA screening.    Due to his high risk of stroke and heart attack, recommend CT calcium scan to determine his risk of stroke and heart attack in the next 10 years.        Hypertension Follow-up      Do you check your blood pressure regularly outside of the clinic? No     Are you following a low salt diet? No    Are your blood pressures ever more than 140 on the top number (systolic) OR more   than 90 on the bottom number (diastolic), for example 140/90? No   Rx: Norvasc, hydrochlorothiazide, Losartan, Metoprolol XL  Denies radiation, diaphoresis, shortness of breath, dizziness, syncope, nausea, palpitations, and associated with activity.         BP Readings from Last 2 Encounters:   09/08/22 116/86   05/31/22 130/88     Hemoglobin A1C (%)   Date Value   12/28/2021 7.3 (H)   07/01/2021 7.0 (H)     LDL Cholesterol Calculated (mg/dL)   Date Value   12/28/2021 91   12/31/2020 85     LDL Cholesterol Direct   Date Value   07/08/2016 96 mg/dl   05/25/2012 113 mg/dL           Review of Systems   ENT/MOUTH: NEGATIVE for ear, mouth and throat problems  RESP: NEGATIVE for significant cough or SOB  CV: NEGATIVE for chest pain, palpitations or peripheral edema      Objective    /86 (BP Location: Left arm, Patient Position: Sitting, Cuff Size: Adult Regular)   Pulse 71   Resp 14   Ht 1.829 m (6')   Wt 111.9 kg (246 lb 12.8 oz)   SpO2 96%   BMI 33.47 kg/m    Body mass index is 33.47 kg/m .  Physical Exam   GENERAL: healthy, alert and no distress  EYES: Eyes grossly normal to inspection, PERRL  and conjunctivae and sclerae normal  NECK: no adenopathy, no asymmetry, masses, or scars   RESP: lungs clear to auscultation - no rales, rhonchi or wheezes  CV: regular rate and rhythm, normal S1 S2, no S3 or S4, no murmur, click or rub, 1+ bilateral peripheral edema and peripheral pulses strong  ABDOMEN: soft, nontender, no hepatosplenomegaly, no masses and bowel sounds normal  MS: Increased right hip pain noted with direct palpation of the hip joint, with adduction and abduction and with raising the leg out in front of him  NEURO: Normal strength and tone, mentation intact and speech normal

## 2022-09-09 LAB
ANION GAP SERPL CALCULATED.3IONS-SCNC: 10 MMOL/L (ref 7–15)
BUN SERPL-MCNC: 32 MG/DL (ref 8–23)
CALCIUM SERPL-MCNC: 9.4 MG/DL (ref 8.8–10.2)
CHLORIDE SERPL-SCNC: 100 MMOL/L (ref 98–107)
CREAT SERPL-MCNC: 1.07 MG/DL (ref 0.67–1.17)
DEPRECATED HCO3 PLAS-SCNC: 31 MMOL/L (ref 22–29)
GFR SERPL CREATININE-BSD FRML MDRD: 77 ML/MIN/1.73M2
GLUCOSE SERPL-MCNC: 90 MG/DL (ref 70–99)
POTASSIUM SERPL-SCNC: 3.4 MMOL/L (ref 3.4–5.3)
SODIUM SERPL-SCNC: 141 MMOL/L (ref 136–145)

## 2022-09-19 ENCOUNTER — HOSPITAL ENCOUNTER (OUTPATIENT)
Dept: CT IMAGING | Facility: CLINIC | Age: 67
Discharge: HOME | End: 2022-09-19
Attending: NURSE PRACTITIONER | Admitting: NURSE PRACTITIONER

## 2022-09-19 ENCOUNTER — HOSPITAL ENCOUNTER (OUTPATIENT)
Dept: ULTRASOUND IMAGING | Facility: CLINIC | Age: 67
Discharge: HOME | End: 2022-09-19
Attending: NURSE PRACTITIONER | Admitting: NURSE PRACTITIONER
Payer: COMMERCIAL

## 2022-09-19 DIAGNOSIS — Z87.891 HISTORY OF SMOKING: ICD-10-CM

## 2022-09-19 DIAGNOSIS — E78.00 PURE HYPERCHOLESTEROLEMIA: ICD-10-CM

## 2022-09-19 DIAGNOSIS — I10 ESSENTIAL HYPERTENSION: ICD-10-CM

## 2022-09-19 DIAGNOSIS — Z13.6 SCREENING FOR AAA (ABDOMINAL AORTIC ANEURYSM): ICD-10-CM

## 2022-09-19 LAB
CV CALCIUM SCORE AGATSTON LM: 0
CV CALCIUM SCORING AGATSON LAD: 0
CV CALCIUM SCORING AGATSTON CX: 89
CV CALCIUM SCORING AGATSTON RCA: 2
CV CALCIUM SCORING AGATSTON TOTAL: 91

## 2022-09-19 PROCEDURE — 75571 CT HRT W/O DYE W/CA TEST: CPT

## 2022-09-19 PROCEDURE — 75571 CT HRT W/O DYE W/CA TEST: CPT | Mod: 26 | Performed by: INTERNAL MEDICINE

## 2022-09-19 PROCEDURE — 76775 US EXAM ABDO BACK WALL LIM: CPT

## 2022-10-04 ENCOUNTER — TRANSFERRED RECORDS (OUTPATIENT)
Dept: HEALTH INFORMATION MANAGEMENT | Facility: CLINIC | Age: 67
End: 2022-10-04

## 2022-10-13 ENCOUNTER — TRANSFERRED RECORDS (OUTPATIENT)
Dept: HEALTH INFORMATION MANAGEMENT | Facility: CLINIC | Age: 67
End: 2022-10-13

## 2022-11-07 RX ORDER — TRIAMTERENE/HYDROCHLOROTHIAZID 37.5-25 MG
1 TABLET ORAL DAILY
COMMUNITY
End: 2022-12-01

## 2022-11-07 RX ORDER — INSULIN LISPRO 100 [IU]/ML
INJECTION, SOLUTION INTRAVENOUS; SUBCUTANEOUS
COMMUNITY
End: 2022-12-01

## 2022-11-07 RX ORDER — ASPIRIN 81 MG/1
81 TABLET ORAL DAILY
COMMUNITY
End: 2022-12-01

## 2022-11-28 ENCOUNTER — TELEPHONE (OUTPATIENT)
Dept: LAB | Facility: CLINIC | Age: 67
End: 2022-11-28

## 2022-11-28 DIAGNOSIS — E11.9 DIABETES MELLITUS (H): ICD-10-CM

## 2022-11-28 DIAGNOSIS — Z11.4 SCREENING FOR HIV (HUMAN IMMUNODEFICIENCY VIRUS): ICD-10-CM

## 2022-11-28 DIAGNOSIS — Z13.220 SCREENING FOR HYPERLIPIDEMIA: ICD-10-CM

## 2022-11-28 NOTE — PROVIDER NOTIFICATION
Discharge plan as reported by patient:       11/23/22 0717   Discharge Planning   Patient/Family Anticipates Transition to home with family   Living Arrangements   People in Home spouse   Type of Residence Private Residence   Is your private residence a single family home or apartment? Single family home   Stair Railings, Within Home, Primary railings safe and in good condition;railing on right side (ascending)   Once home, are you able to live on one level? Yes   Bathroom Shower/Tub Tub/Shower unit   Equipment Currently Used at Home cane, straight;raised toilet seat;walker, standard   Support System   Support Systems Spouse/Significant Other   Do you have someone available to stay with you one or two nights once you are home? Yes

## 2022-12-01 ENCOUNTER — OFFICE VISIT (OUTPATIENT)
Dept: FAMILY MEDICINE | Facility: CLINIC | Age: 67
End: 2022-12-01
Payer: COMMERCIAL

## 2022-12-01 VITALS
RESPIRATION RATE: 16 BRPM | DIASTOLIC BLOOD PRESSURE: 84 MMHG | SYSTOLIC BLOOD PRESSURE: 120 MMHG | WEIGHT: 244.1 LBS | HEART RATE: 80 BPM | HEIGHT: 73 IN | BODY MASS INDEX: 32.35 KG/M2 | OXYGEN SATURATION: 96 % | TEMPERATURE: 98.1 F

## 2022-12-01 DIAGNOSIS — S72.001A CLOSED FRACTURE OF RIGHT HIP, INITIAL ENCOUNTER (H): ICD-10-CM

## 2022-12-01 DIAGNOSIS — I10 ESSENTIAL HYPERTENSION: ICD-10-CM

## 2022-12-01 DIAGNOSIS — E66.811 CLASS 1 OBESITY DUE TO EXCESS CALORIES WITH SERIOUS COMORBIDITY AND BODY MASS INDEX (BMI) OF 32.0 TO 32.9 IN ADULT: ICD-10-CM

## 2022-12-01 DIAGNOSIS — M16.11 PRIMARY OSTEOARTHRITIS OF RIGHT HIP: ICD-10-CM

## 2022-12-01 DIAGNOSIS — Z11.4 SCREENING FOR HIV (HUMAN IMMUNODEFICIENCY VIRUS): ICD-10-CM

## 2022-12-01 DIAGNOSIS — G47.33 OBSTRUCTIVE SLEEP APNEA (ADULT) (PEDIATRIC): ICD-10-CM

## 2022-12-01 DIAGNOSIS — E66.09 CLASS 1 OBESITY DUE TO EXCESS CALORIES WITH SERIOUS COMORBIDITY AND BODY MASS INDEX (BMI) OF 32.0 TO 32.9 IN ADULT: ICD-10-CM

## 2022-12-01 DIAGNOSIS — Z01.818 PREOP GENERAL PHYSICAL EXAM: Primary | ICD-10-CM

## 2022-12-01 DIAGNOSIS — E11.9 TYPE 2 DIABETES MELLITUS WITHOUT COMPLICATION, WITHOUT LONG-TERM CURRENT USE OF INSULIN (H): ICD-10-CM

## 2022-12-01 DIAGNOSIS — E78.00 PURE HYPERCHOLESTEROLEMIA: ICD-10-CM

## 2022-12-01 LAB
CHOLEST SERPL-MCNC: 132 MG/DL
ERYTHROCYTE [DISTWIDTH] IN BLOOD BY AUTOMATED COUNT: 12.7 % (ref 10–15)
HBA1C MFR BLD: 7.2 % (ref 0–5.6)
HCT VFR BLD AUTO: 41.8 % (ref 40–53)
HDLC SERPL-MCNC: 34 MG/DL
HGB BLD-MCNC: 14.3 G/DL (ref 13.3–17.7)
LDLC SERPL CALC-MCNC: 75 MG/DL
MCH RBC QN AUTO: 30.6 PG (ref 26.5–33)
MCHC RBC AUTO-ENTMCNC: 34.2 G/DL (ref 31.5–36.5)
MCV RBC AUTO: 89 FL (ref 78–100)
NONHDLC SERPL-MCNC: 98 MG/DL
PLATELET # BLD AUTO: 237 10E3/UL (ref 150–450)
RBC # BLD AUTO: 4.68 10E6/UL (ref 4.4–5.9)
TRIGL SERPL-MCNC: 115 MG/DL
WBC # BLD AUTO: 6.6 10E3/UL (ref 4–11)

## 2022-12-01 PROCEDURE — 99214 OFFICE O/P EST MOD 30 MIN: CPT | Performed by: NURSE PRACTITIONER

## 2022-12-01 PROCEDURE — 83036 HEMOGLOBIN GLYCOSYLATED A1C: CPT | Performed by: NURSE PRACTITIONER

## 2022-12-01 PROCEDURE — 82043 UR ALBUMIN QUANTITATIVE: CPT | Performed by: NURSE PRACTITIONER

## 2022-12-01 PROCEDURE — 36415 COLL VENOUS BLD VENIPUNCTURE: CPT | Performed by: NURSE PRACTITIONER

## 2022-12-01 PROCEDURE — 87389 HIV-1 AG W/HIV-1&-2 AB AG IA: CPT | Performed by: NURSE PRACTITIONER

## 2022-12-01 PROCEDURE — 80053 COMPREHEN METABOLIC PANEL: CPT | Performed by: NURSE PRACTITIONER

## 2022-12-01 PROCEDURE — 85027 COMPLETE CBC AUTOMATED: CPT | Performed by: NURSE PRACTITIONER

## 2022-12-01 PROCEDURE — 80061 LIPID PANEL: CPT | Performed by: NURSE PRACTITIONER

## 2022-12-01 ASSESSMENT — PAIN SCALES - GENERAL: PAINLEVEL: EXTREME PAIN (8)

## 2022-12-01 NOTE — PROGRESS NOTES
[facility]  [address], [address]  [place]  [facility] [address]  Phone: [phone]  Fax: [phone]  Primary Provider: Imelda Beatty  Pre-op Performing Provider: SARITA HARDWICK      PREOPERATIVE EVALUATION:  Today's date: 12/1/2022    Ashutosh Vieira is a 67 year old male who presents for a preoperative evaluation.    Surgical Information:  Surgery/Procedure: RIGHT DIRECT ANTERIOR TOTAL HIP ARTHROPLASTY  Surgery Location: [facility] Main OR  Surgeon: Navin Londono MD  Surgery Date: 12/7/2022  Time of Surgery: 11:30 AM  Where patient plans to recover: At a TCU (Transitional Care Unit)  Fax number for surgical facility: Note does not need to be faxed, will be available electronically in Epic.    Type of Anesthesia Anticipated: General    Assessment & Plan     The proposed surgical procedure is considered INTERMEDIATE risk.    Preop general physical exam  Medically optimized for surgery    - CBC with platelets; Future  - Comprehensive metabolic panel (BMP + Alb, Alk Phos, ALT, AST, Total. Bili, TP); Future  - CBC with platelets  - Comprehensive metabolic panel (BMP + Alb, Alk Phos, ALT, AST, Total. Bili, TP)    Closed fracture of right hip, initial encounter (H)  Poor healing with degenerative bone disease.  Planned JUNAID    Primary osteoarthritis of right hip    Type 2 diabetes mellitus without complication, without long-term current use of insulin (H)  Well controlled  - HEMOGLOBIN A1C    Screening for HIV (human immunodeficiency virus)  - HIV Antigen Antibody Combo    Obstructive Sleep Apnea    Class 1 obesity due to excess calories with serious comorbidity and body mass index (BMI) of 32.0 to 32.9 in adult    Essential Hypercholesterolemia    Essential hypertension  Well-controlled    Risks and Recommendations:  The patient has the following additional risks and recommendations for perioperative complications:   - No identified additional risk  factors other than previously addressed    Medication Instructions:  Okay to take amlodipine and metoprolol morning of surgery.  Hold everything else.    RECOMMENDATION:  APPROVAL GIVEN to proceed with proposed procedure, without further diagnostic evaluation.    Reviewed A1c x1, metabolic panel x1, orthopedic note x1      Subjective     HPI related to upcoming procedure: Patient fell off of a golf cart earlier this year and was dealing with perpetual hip pain.  Imaging showed poorly healing fracture in the setting of degenerative joint disease.  He was given the options of repair versus hip replacement and elected for JUNAID    Preop Questions 11/25/2022   1. Have you ever had a heart attack or stroke? No   2. Have you ever had surgery on your heart or blood vessels, such as a stent placement, a coronary artery bypass, or surgery on an artery in your head, neck, heart, or legs? No   3. Do you have chest pain with activity? No   4. Do you have a history of  heart failure? No   5. Do you currently have a cold, bronchitis or symptoms of other infection? No   6. Do you have a cough, shortness of breath, or wheezing? No   7. Do you or anyone in your family have previous history of blood clots? No   8. Do you or does anyone in your family have a serious bleeding problem such as prolonged bleeding following surgeries or cuts? No   9. Have you ever had problems with anemia or been told to take iron pills? No   10. Have you had any abnormal blood loss such as black, tarry or bloody stools? No   11. Have you ever had a blood transfusion? No   12. Are you willing to have a blood transfusion if it is medically needed before, during, or after your surgery? Yes   13. Have you or any of your relatives ever had problems with anesthesia? No   14. Do you have sleep apnea, excessive snoring or daytime drowsiness? YES - sleep apnea   14a. Do you have a CPAP machine? Yes   15. Do you have any artifical heart valves or other implanted  medical devices like a pacemaker, defibrillator, or continuous glucose monitor? No   16. Do you have artificial joints? YES - left brianna   17. Are you allergic to latex? No       Health Care Directive:  Patient does not have a Health Care Directive or Living Will: previously reviewed    Preoperative Review of :   reviewed - no record of controlled substances prescribed.}    Status of Chronic Conditions:  See problem list for active medical problems.  Problems all longstanding and stable, except as noted/documented.  See ROS for pertinent symptoms related to these conditions.      Review of Systems  CONSTITUTIONAL: NEGATIVE for fever, chills, change in weight  INTEGUMENTARY/SKIN: NEGATIVE for worrisome rashes, moles or lesions  EYES: NEGATIVE for vision changes or irritation  ENT/MOUTH: NEGATIVE for ear, mouth and throat problems  RESP: NEGATIVE for significant cough or SOB  CV: NEGATIVE for chest pain, palpitations or peripheral edema  GI: NEGATIVE for nausea, abdominal pain, heartburn, or change in bowel habits  : NEGATIVE for frequency, dysuria, or hematuria  MUSCULOSKELETAL: NEGATIVE for significant arthralgias or myalgia  NEURO: NEGATIVE for weakness, dizziness or paresthesias  ENDOCRINE: NEGATIVE for temperature intolerance, skin/hair changes  HEME: NEGATIVE for bleeding problems  PSYCHIATRIC: NEGATIVE for changes in mood or affect    Patient Active Problem List    Diagnosis Date Noted     Screening for AAA (abdominal aortic aneurysm) 09/08/2022     Priority: Medium     History of smoking 09/08/2022     Priority: Medium     Familial combined hyperlipidemia 01/26/2018     Priority: Medium     Tinnitus 12/28/2016     Priority: Medium     Essential Hypertension      Priority: Medium     Created by Conversion  Replacement Utility updated for latest IMO load         Allergic Rhinitis      Priority: Medium     Created by Conversion  Replacement Utility updated for latest IMO load         Diabetes mellitus (H)  05/29/2015     Priority: Medium     Essential Hypercholesterolemia      Priority: Medium     Created by Conversion         Obesity      Priority: Medium     Created by Conversion         Obstructive Sleep Apnea      Priority: Medium     Created by Conversion         Benign Adenomatous Polyp Of The Large Intestine      Priority: Medium     Created by Conversion          Past Medical History:   Diagnosis Date     Allergic rhinitis      Allergic rhinitis      Diabetes mellitus (H)      Diabetes mellitus (H)      Erectile dysfunction      Erectile dysfunction      Hyperlipemia      Hyperlipemia      Hypertension      Hypertension      Obesity (BMI 35.0-39.9 without comorbidity)      Obesity (BMI 35.0-39.9 without comorbidity)      ANA (obstructive sleep apnea)      ANA (obstructive sleep apnea)      Plantar fasciitis      Plantar fasciitis      Prepatellar bursitis      Prepatellar bursitis      Tinnitus      Tinnitus      Past Surgical History:   Procedure Laterality Date     ORTHOPEDIC SURGERY       Current Outpatient Medications   Medication Sig Dispense Refill     ACCU-CHEK SMARTVIEW TEST STRIP strips [ACCU-CHEK SMARTVIEW TEST STRIP STRIPS] USE 1 STRIP TO CHECK GLUCOSE TWICE DAILY 200 strip 3     amLODIPine (NORVASC) 10 MG tablet Take 1 tablet (10 mg) by mouth daily 90 tablet 3     glipiZIDE (GLUCOTROL) 5 MG tablet Take 1 tablet (5 mg) by mouth 2 times daily (before meals) 180 tablet 3     hydrochlorothiazide (HYDRODIURIL) 25 MG tablet Take 1 tablet (25 mg) by mouth daily 90 tablet 3     lancets (ACCU-CHEK MULTICLIX LANCET) Misc [LANCETS (ACCU-CHEK MULTICLIX LANCET) MISC] Use 100 each As Directed 2 (two) times a day. 200 each 0     losartan (COZAAR) 100 MG tablet Take 1 tablet (100 mg) by mouth daily 90 tablet 3     metFORMIN (GLUCOPHAGE) 1000 MG tablet Take 1 tablet (1,000 mg) by mouth 2 times daily (with meals) 180 tablet 3     metoprolol succinate ER (TOPROL XL) 25 MG 24 hr tablet Take 1 tablet (25 mg) by mouth  "daily 90 tablet 3     simvastatin (ZOCOR) 80 MG tablet Take 1 tablet (80 mg) by mouth every evening 90 tablet 3       Allergies   Allergen Reactions     Lisinopril Cough        Social History     Tobacco Use     Smoking status: Some Days     Types: Cigars, cigarillos or filtered cigars     Smokeless tobacco: Never     Tobacco comments:     cigars   Substance Use Topics     Alcohol use: Yes     Comment: Alcoholic Drinks/day: weekends     Family History   Problem Relation Age of Onset     Melanoma Mother      No Known Problems Brother      History   Drug Use No         Objective     /84 (BP Location: Left arm, Patient Position: Sitting, Cuff Size: Adult Large)   Pulse 80   Temp 98.1  F (36.7  C) (Oral)   Resp 16   Ht 1.842 m (6' 0.5\")   Wt 110.7 kg (244 lb 1.6 oz)   SpO2 96%   BMI 32.65 kg/m      Physical Exam    GENERAL APPEARANCE: healthy, alert and no distress     EYES: EOMI,  PERRL     HENT: ear canals and TM's normal and nose and mouth without ulcers or lesions     NECK: no adenopathy, no asymmetry, masses, or scars and thyroid normal to palpation     RESP: lungs clear to auscultation - no rales, rhonchi or wheezes     CV: regular rates and rhythm, normal S1 S2, no S3 or S4 and no murmur, click or rub     ABDOMEN:  soft, nontender, no HSM or masses and bowel sounds normal     MS: extremities normal- no gross deformities noted, no evidence of inflammation in joints, FROM in all extremities.     SKIN: no suspicious lesions or rashes     NEURO: Normal strength and tone, sensory exam grossly normal, mentation intact and speech normal     PSYCH: mentation appears normal. and affect normal/bright     LYMPHATICS: No cervical adenopathy    Recent Labs   Lab Test 09/08/22  1750 12/28/21  1551 07/01/21  1725     --  142   POTASSIUM 3.4  --  3.5   CR 1.07  --  0.79   A1C 7.1* 7.3* 7.0*        Diagnostics:  Labs pending at this time.  Results will be reviewed when available.   No EKG required, no history " of coronary heart disease, significant arrhythmia, peripheral arterial disease or other structural heart disease.    Revised Cardiac Risk Index (RCRI):  The patient has the following serious cardiovascular risks for perioperative complications:   - No serious cardiac risks = 0 points     RCRI Interpretation: 0 points: Class I (very low risk - 0.4% complication rate)     Signed Electronically by: Raghavendra Zafar NP  Copy of this evaluation report is provided to requesting physician.

## 2022-12-01 NOTE — PATIENT INSTRUCTIONS
Let me know if you'll need pain medicine before surgery.    I will get your paperwork faxed to your surgeon.    Follow your surgeon's directions regarding eating and drinking prior to surgery.     Medications you can take the morning of surgery with a small sip of water include: amlodipine and metoprolol    No advil, ibuprofen, aleve, naproxen, or aspirin a week before surgery. Tylenol is ok.    Stop all supplements a week before surgery.    Your surgeon will manage any complications after your procedure.

## 2022-12-01 NOTE — H&P (VIEW-ONLY)
[facility]  [address], [address]  [place]  [facility] [address]  Phone: [phone]  Fax: [phone]  Primary Provider: Imelda Beatty  Pre-op Performing Provider: SARITA HARDWICK      PREOPERATIVE EVALUATION:  Today's date: 12/1/2022    Ashutosh Vieira is a 67 year old male who presents for a preoperative evaluation.    Surgical Information:  Surgery/Procedure: RIGHT DIRECT ANTERIOR TOTAL HIP ARTHROPLASTY  Surgery Location: [facility] Main OR  Surgeon: Navin Londono MD  Surgery Date: 12/7/2022  Time of Surgery: 11:30 AM  Where patient plans to recover: At a TCU (Transitional Care Unit)  Fax number for surgical facility: Note does not need to be faxed, will be available electronically in Epic.    Type of Anesthesia Anticipated: General    Assessment & Plan     The proposed surgical procedure is considered INTERMEDIATE risk.    Preop general physical exam  Medically optimized for surgery    - CBC with platelets; Future  - Comprehensive metabolic panel (BMP + Alb, Alk Phos, ALT, AST, Total. Bili, TP); Future  - CBC with platelets  - Comprehensive metabolic panel (BMP + Alb, Alk Phos, ALT, AST, Total. Bili, TP)    Closed fracture of right hip, initial encounter (H)  Poor healing with degenerative bone disease.  Planned JUNAID    Primary osteoarthritis of right hip    Type 2 diabetes mellitus without complication, without long-term current use of insulin (H)  Well controlled  - HEMOGLOBIN A1C    Screening for HIV (human immunodeficiency virus)  - HIV Antigen Antibody Combo    Obstructive Sleep Apnea    Class 1 obesity due to excess calories with serious comorbidity and body mass index (BMI) of 32.0 to 32.9 in adult    Essential Hypercholesterolemia    Essential hypertension  Well-controlled    Risks and Recommendations:  The patient has the following additional risks and recommendations for perioperative complications:   - No identified additional risk  factors other than previously addressed    Medication Instructions:  Okay to take amlodipine and metoprolol morning of surgery.  Hold everything else.    RECOMMENDATION:  APPROVAL GIVEN to proceed with proposed procedure, without further diagnostic evaluation.    Reviewed A1c x1, metabolic panel x1, orthopedic note x1      Subjective     HPI related to upcoming procedure: Patient fell off of a golf cart earlier this year and was dealing with perpetual hip pain.  Imaging showed poorly healing fracture in the setting of degenerative joint disease.  He was given the options of repair versus hip replacement and elected for JUNAID    Preop Questions 11/25/2022   1. Have you ever had a heart attack or stroke? No   2. Have you ever had surgery on your heart or blood vessels, such as a stent placement, a coronary artery bypass, or surgery on an artery in your head, neck, heart, or legs? No   3. Do you have chest pain with activity? No   4. Do you have a history of  heart failure? No   5. Do you currently have a cold, bronchitis or symptoms of other infection? No   6. Do you have a cough, shortness of breath, or wheezing? No   7. Do you or anyone in your family have previous history of blood clots? No   8. Do you or does anyone in your family have a serious bleeding problem such as prolonged bleeding following surgeries or cuts? No   9. Have you ever had problems with anemia or been told to take iron pills? No   10. Have you had any abnormal blood loss such as black, tarry or bloody stools? No   11. Have you ever had a blood transfusion? No   12. Are you willing to have a blood transfusion if it is medically needed before, during, or after your surgery? Yes   13. Have you or any of your relatives ever had problems with anesthesia? No   14. Do you have sleep apnea, excessive snoring or daytime drowsiness? YES - sleep apnea   14a. Do you have a CPAP machine? Yes   15. Do you have any artifical heart valves or other implanted  medical devices like a pacemaker, defibrillator, or continuous glucose monitor? No   16. Do you have artificial joints? YES - left brianna   17. Are you allergic to latex? No       Health Care Directive:  Patient does not have a Health Care Directive or Living Will: previously reviewed    Preoperative Review of :   reviewed - no record of controlled substances prescribed.}    Status of Chronic Conditions:  See problem list for active medical problems.  Problems all longstanding and stable, except as noted/documented.  See ROS for pertinent symptoms related to these conditions.      Review of Systems  CONSTITUTIONAL: NEGATIVE for fever, chills, change in weight  INTEGUMENTARY/SKIN: NEGATIVE for worrisome rashes, moles or lesions  EYES: NEGATIVE for vision changes or irritation  ENT/MOUTH: NEGATIVE for ear, mouth and throat problems  RESP: NEGATIVE for significant cough or SOB  CV: NEGATIVE for chest pain, palpitations or peripheral edema  GI: NEGATIVE for nausea, abdominal pain, heartburn, or change in bowel habits  : NEGATIVE for frequency, dysuria, or hematuria  MUSCULOSKELETAL: NEGATIVE for significant arthralgias or myalgia  NEURO: NEGATIVE for weakness, dizziness or paresthesias  ENDOCRINE: NEGATIVE for temperature intolerance, skin/hair changes  HEME: NEGATIVE for bleeding problems  PSYCHIATRIC: NEGATIVE for changes in mood or affect    Patient Active Problem List    Diagnosis Date Noted     Screening for AAA (abdominal aortic aneurysm) 09/08/2022     Priority: Medium     History of smoking 09/08/2022     Priority: Medium     Familial combined hyperlipidemia 01/26/2018     Priority: Medium     Tinnitus 12/28/2016     Priority: Medium     Essential Hypertension      Priority: Medium     Created by Conversion  Replacement Utility updated for latest IMO load         Allergic Rhinitis      Priority: Medium     Created by Conversion  Replacement Utility updated for latest IMO load         Diabetes mellitus (H)  05/29/2015     Priority: Medium     Essential Hypercholesterolemia      Priority: Medium     Created by Conversion         Obesity      Priority: Medium     Created by Conversion         Obstructive Sleep Apnea      Priority: Medium     Created by Conversion         Benign Adenomatous Polyp Of The Large Intestine      Priority: Medium     Created by Conversion          Past Medical History:   Diagnosis Date     Allergic rhinitis      Allergic rhinitis      Diabetes mellitus (H)      Diabetes mellitus (H)      Erectile dysfunction      Erectile dysfunction      Hyperlipemia      Hyperlipemia      Hypertension      Hypertension      Obesity (BMI 35.0-39.9 without comorbidity)      Obesity (BMI 35.0-39.9 without comorbidity)      ANA (obstructive sleep apnea)      ANA (obstructive sleep apnea)      Plantar fasciitis      Plantar fasciitis      Prepatellar bursitis      Prepatellar bursitis      Tinnitus      Tinnitus      Past Surgical History:   Procedure Laterality Date     ORTHOPEDIC SURGERY       Current Outpatient Medications   Medication Sig Dispense Refill     ACCU-CHEK SMARTVIEW TEST STRIP strips [ACCU-CHEK SMARTVIEW TEST STRIP STRIPS] USE 1 STRIP TO CHECK GLUCOSE TWICE DAILY 200 strip 3     amLODIPine (NORVASC) 10 MG tablet Take 1 tablet (10 mg) by mouth daily 90 tablet 3     glipiZIDE (GLUCOTROL) 5 MG tablet Take 1 tablet (5 mg) by mouth 2 times daily (before meals) 180 tablet 3     hydrochlorothiazide (HYDRODIURIL) 25 MG tablet Take 1 tablet (25 mg) by mouth daily 90 tablet 3     lancets (ACCU-CHEK MULTICLIX LANCET) Misc [LANCETS (ACCU-CHEK MULTICLIX LANCET) MISC] Use 100 each As Directed 2 (two) times a day. 200 each 0     losartan (COZAAR) 100 MG tablet Take 1 tablet (100 mg) by mouth daily 90 tablet 3     metFORMIN (GLUCOPHAGE) 1000 MG tablet Take 1 tablet (1,000 mg) by mouth 2 times daily (with meals) 180 tablet 3     metoprolol succinate ER (TOPROL XL) 25 MG 24 hr tablet Take 1 tablet (25 mg) by mouth  "daily 90 tablet 3     simvastatin (ZOCOR) 80 MG tablet Take 1 tablet (80 mg) by mouth every evening 90 tablet 3       Allergies   Allergen Reactions     Lisinopril Cough        Social History     Tobacco Use     Smoking status: Some Days     Types: Cigars, cigarillos or filtered cigars     Smokeless tobacco: Never     Tobacco comments:     cigars   Substance Use Topics     Alcohol use: Yes     Comment: Alcoholic Drinks/day: weekends     Family History   Problem Relation Age of Onset     Melanoma Mother      No Known Problems Brother      History   Drug Use No         Objective     /84 (BP Location: Left arm, Patient Position: Sitting, Cuff Size: Adult Large)   Pulse 80   Temp 98.1  F (36.7  C) (Oral)   Resp 16   Ht 1.842 m (6' 0.5\")   Wt 110.7 kg (244 lb 1.6 oz)   SpO2 96%   BMI 32.65 kg/m      Physical Exam    GENERAL APPEARANCE: healthy, alert and no distress     EYES: EOMI,  PERRL     HENT: ear canals and TM's normal and nose and mouth without ulcers or lesions     NECK: no adenopathy, no asymmetry, masses, or scars and thyroid normal to palpation     RESP: lungs clear to auscultation - no rales, rhonchi or wheezes     CV: regular rates and rhythm, normal S1 S2, no S3 or S4 and no murmur, click or rub     ABDOMEN:  soft, nontender, no HSM or masses and bowel sounds normal     MS: extremities normal- no gross deformities noted, no evidence of inflammation in joints, FROM in all extremities.     SKIN: no suspicious lesions or rashes     NEURO: Normal strength and tone, sensory exam grossly normal, mentation intact and speech normal     PSYCH: mentation appears normal. and affect normal/bright     LYMPHATICS: No cervical adenopathy    Recent Labs   Lab Test 09/08/22  1750 12/28/21  1551 07/01/21  1725     --  142   POTASSIUM 3.4  --  3.5   CR 1.07  --  0.79   A1C 7.1* 7.3* 7.0*        Diagnostics:  Labs pending at this time.  Results will be reviewed when available.   No EKG required, no history " of coronary heart disease, significant arrhythmia, peripheral arterial disease or other structural heart disease.    Revised Cardiac Risk Index (RCRI):  The patient has the following serious cardiovascular risks for perioperative complications:   - No serious cardiac risks = 0 points     RCRI Interpretation: 0 points: Class I (very low risk - 0.4% complication rate)     Signed Electronically by: Raghavendra Zafar NP  Copy of this evaluation report is provided to requesting physician.

## 2022-12-02 LAB
ALBUMIN SERPL BCG-MCNC: 4.3 G/DL (ref 3.5–5.2)
ALP SERPL-CCNC: 72 U/L (ref 40–129)
ALT SERPL W P-5'-P-CCNC: 19 U/L (ref 10–50)
ANION GAP SERPL CALCULATED.3IONS-SCNC: 13 MMOL/L (ref 7–15)
AST SERPL W P-5'-P-CCNC: 21 U/L (ref 10–50)
BILIRUB SERPL-MCNC: 0.4 MG/DL
BUN SERPL-MCNC: 19.7 MG/DL (ref 8–23)
CALCIUM SERPL-MCNC: 9 MG/DL (ref 8.8–10.2)
CHLORIDE SERPL-SCNC: 105 MMOL/L (ref 98–107)
CREAT SERPL-MCNC: 0.78 MG/DL (ref 0.67–1.17)
CREAT UR-MCNC: 135 MG/DL
DEPRECATED HCO3 PLAS-SCNC: 27 MMOL/L (ref 22–29)
GFR SERPL CREATININE-BSD FRML MDRD: >90 ML/MIN/1.73M2
GLUCOSE SERPL-MCNC: 122 MG/DL (ref 70–99)
HIV 1+2 AB+HIV1 P24 AG SERPL QL IA: NONREACTIVE
MICROALBUMIN UR-MCNC: 64.9 MG/L
MICROALBUMIN/CREAT UR: 48.07 MG/G CR (ref 0–17)
POTASSIUM SERPL-SCNC: 3.7 MMOL/L (ref 3.4–5.3)
PROT SERPL-MCNC: 6.7 G/DL (ref 6.4–8.3)
SODIUM SERPL-SCNC: 145 MMOL/L (ref 136–145)

## 2022-12-05 ENCOUNTER — LAB (OUTPATIENT)
Dept: LAB | Facility: CLINIC | Age: 67
End: 2022-12-05
Payer: COMMERCIAL

## 2022-12-05 DIAGNOSIS — Z01.818 PREOP GENERAL PHYSICAL EXAM: ICD-10-CM

## 2022-12-05 PROCEDURE — U0005 INFEC AGEN DETEC AMPLI PROBE: HCPCS

## 2022-12-05 PROCEDURE — U0003 INFECTIOUS AGENT DETECTION BY NUCLEIC ACID (DNA OR RNA); SEVERE ACUTE RESPIRATORY SYNDROME CORONAVIRUS 2 (SARS-COV-2) (CORONAVIRUS DISEASE [COVID-19]), AMPLIFIED PROBE TECHNIQUE, MAKING USE OF HIGH THROUGHPUT TECHNOLOGIES AS DESCRIBED BY CMS-2020-01-R: HCPCS

## 2022-12-06 ENCOUNTER — ANESTHESIA EVENT (OUTPATIENT)
Dept: SURGERY | Facility: CLINIC | Age: 67
End: 2022-12-06
Payer: COMMERCIAL

## 2022-12-06 LAB — SARS-COV-2 RNA RESP QL NAA+PROBE: NEGATIVE

## 2022-12-07 ENCOUNTER — APPOINTMENT (OUTPATIENT)
Dept: PHYSICAL THERAPY | Facility: CLINIC | Age: 67
End: 2022-12-07
Payer: COMMERCIAL

## 2022-12-07 ENCOUNTER — ANESTHESIA (OUTPATIENT)
Dept: SURGERY | Facility: CLINIC | Age: 67
End: 2022-12-07
Payer: COMMERCIAL

## 2022-12-07 ENCOUNTER — HOSPITAL ENCOUNTER (OUTPATIENT)
Facility: CLINIC | Age: 67
Discharge: HOME | End: 2022-12-07
Attending: ORTHOPAEDIC SURGERY | Admitting: ORTHOPAEDIC SURGERY
Payer: COMMERCIAL

## 2022-12-07 ENCOUNTER — APPOINTMENT (OUTPATIENT)
Dept: RADIOLOGY | Facility: CLINIC | Age: 67
End: 2022-12-07
Payer: COMMERCIAL

## 2022-12-07 VITALS
RESPIRATION RATE: 18 BRPM | BODY MASS INDEX: 33.32 KG/M2 | DIASTOLIC BLOOD PRESSURE: 80 MMHG | HEART RATE: 59 BPM | OXYGEN SATURATION: 96 % | WEIGHT: 246 LBS | HEIGHT: 72 IN | TEMPERATURE: 97.5 F | SYSTOLIC BLOOD PRESSURE: 122 MMHG

## 2022-12-07 DIAGNOSIS — Z96.641 S/P TOTAL RIGHT HIP ARTHROPLASTY: Primary | ICD-10-CM

## 2022-12-07 LAB
GLUCOSE BLDC GLUCOMTR-MCNC: 154 MG/DL (ref 70–99)
GLUCOSE BLDC GLUCOMTR-MCNC: 180 MG/DL (ref 70–99)

## 2022-12-07 PROCEDURE — 250N000009 HC RX 250

## 2022-12-07 PROCEDURE — 258N000003 HC RX IP 258 OP 636: Performed by: NURSE ANESTHETIST, CERTIFIED REGISTERED

## 2022-12-07 PROCEDURE — 258N000003 HC RX IP 258 OP 636: Performed by: ANESTHESIOLOGY

## 2022-12-07 PROCEDURE — 250N000011 HC RX IP 250 OP 636: Performed by: ANESTHESIOLOGY

## 2022-12-07 PROCEDURE — 250N000009 HC RX 250: Performed by: ORTHOPAEDIC SURGERY

## 2022-12-07 PROCEDURE — 97116 GAIT TRAINING THERAPY: CPT | Mod: GP

## 2022-12-07 PROCEDURE — 97110 THERAPEUTIC EXERCISES: CPT | Mod: GP

## 2022-12-07 PROCEDURE — 710N000012 HC RECOVERY PHASE 2, PER MINUTE: Performed by: ORTHOPAEDIC SURGERY

## 2022-12-07 PROCEDURE — 97161 PT EVAL LOW COMPLEX 20 MIN: CPT | Mod: GP

## 2022-12-07 PROCEDURE — 250N000011 HC RX IP 250 OP 636

## 2022-12-07 PROCEDURE — 272N000001 HC OR GENERAL SUPPLY STERILE: Performed by: ORTHOPAEDIC SURGERY

## 2022-12-07 PROCEDURE — 250N000009 HC RX 250: Performed by: NURSE ANESTHETIST, CERTIFIED REGISTERED

## 2022-12-07 PROCEDURE — 999N000065 XR PELVIS AND HIP PORTABLE RIGHT 1 VIEW

## 2022-12-07 PROCEDURE — 370N000017 HC ANESTHESIA TECHNICAL FEE, PER MIN: Performed by: ORTHOPAEDIC SURGERY

## 2022-12-07 PROCEDURE — 82962 GLUCOSE BLOOD TEST: CPT

## 2022-12-07 PROCEDURE — 360N000077 HC SURGERY LEVEL 4, PER MIN: Performed by: ORTHOPAEDIC SURGERY

## 2022-12-07 PROCEDURE — 99204 OFFICE O/P NEW MOD 45 MIN: CPT | Performed by: INTERNAL MEDICINE

## 2022-12-07 PROCEDURE — 250N000011 HC RX IP 250 OP 636: Performed by: NURSE ANESTHETIST, CERTIFIED REGISTERED

## 2022-12-07 PROCEDURE — 250N000013 HC RX MED GY IP 250 OP 250 PS 637

## 2022-12-07 PROCEDURE — 710N000010 HC RECOVERY PHASE 1, LEVEL 2, PER MIN: Performed by: ORTHOPAEDIC SURGERY

## 2022-12-07 PROCEDURE — 999N000141 HC STATISTIC PRE-PROCEDURE NURSING ASSESSMENT: Performed by: ORTHOPAEDIC SURGERY

## 2022-12-07 PROCEDURE — C1776 JOINT DEVICE (IMPLANTABLE): HCPCS | Performed by: ORTHOPAEDIC SURGERY

## 2022-12-07 DEVICE — TRIDENT X3 0 DEGREE POLYETHYLENE INSERT
Type: IMPLANTABLE DEVICE | Site: HIP | Status: FUNCTIONAL
Brand: TRIDENT X3 INSERT

## 2022-12-07 DEVICE — TRIDENT II TRITANIUM SOLIDBACK ACETABULAR SHELL 54E
Type: IMPLANTABLE DEVICE | Site: HIP | Status: FUNCTIONAL
Brand: TRIDENT II

## 2022-12-07 DEVICE — CERAMIC V40 FEMORAL HEAD
Type: IMPLANTABLE DEVICE | Site: HIP | Status: FUNCTIONAL
Brand: BIOLOX

## 2022-12-07 DEVICE — 127 DEGREE NECK ANGLE HIP STEM
Type: IMPLANTABLE DEVICE | Site: HIP | Status: FUNCTIONAL
Brand: ACCOLADE

## 2022-12-07 RX ORDER — METOPROLOL SUCCINATE 25 MG/1
25 TABLET, EXTENDED RELEASE ORAL DAILY
Status: DISCONTINUED | OUTPATIENT
Start: 2022-12-07 | End: 2022-12-07 | Stop reason: HOSPADM

## 2022-12-07 RX ORDER — ONDANSETRON 4 MG/1
4 TABLET, ORALLY DISINTEGRATING ORAL EVERY 6 HOURS PRN
Status: CANCELLED | OUTPATIENT
Start: 2022-12-07

## 2022-12-07 RX ORDER — ACETAMINOPHEN 325 MG/1
975 TABLET ORAL EVERY 8 HOURS
Status: CANCELLED | OUTPATIENT
Start: 2022-12-07 | End: 2022-12-10

## 2022-12-07 RX ORDER — PROCHLORPERAZINE MALEATE 5 MG
5 TABLET ORAL EVERY 6 HOURS PRN
Status: CANCELLED | OUTPATIENT
Start: 2022-12-07

## 2022-12-07 RX ORDER — ONDANSETRON 2 MG/ML
INJECTION INTRAMUSCULAR; INTRAVENOUS PRN
Status: DISCONTINUED | OUTPATIENT
Start: 2022-12-07 | End: 2022-12-07

## 2022-12-07 RX ORDER — LIDOCAINE 40 MG/G
CREAM TOPICAL
Status: DISCONTINUED | OUTPATIENT
Start: 2022-12-07 | End: 2022-12-07 | Stop reason: HOSPADM

## 2022-12-07 RX ORDER — OXYCODONE HYDROCHLORIDE 5 MG/1
5 TABLET ORAL EVERY 4 HOURS PRN
Status: CANCELLED | OUTPATIENT
Start: 2022-12-07

## 2022-12-07 RX ORDER — CEFADROXIL 500 MG/1
500 CAPSULE ORAL 2 TIMES DAILY
Qty: 14 CAPSULE | Refills: 0 | Status: SHIPPED | OUTPATIENT
Start: 2022-12-07 | End: 2023-05-26

## 2022-12-07 RX ORDER — BISACODYL 10 MG
10 SUPPOSITORY, RECTAL RECTAL DAILY PRN
Status: CANCELLED | OUTPATIENT
Start: 2022-12-07

## 2022-12-07 RX ORDER — HYDROMORPHONE HCL IN WATER/PF 6 MG/30 ML
0.2 PATIENT CONTROLLED ANALGESIA SYRINGE INTRAVENOUS EVERY 5 MIN PRN
Status: DISCONTINUED | OUTPATIENT
Start: 2022-12-07 | End: 2022-12-07 | Stop reason: HOSPADM

## 2022-12-07 RX ORDER — AMOXICILLIN 250 MG
1 CAPSULE ORAL 2 TIMES DAILY
Status: CANCELLED | OUTPATIENT
Start: 2022-12-07

## 2022-12-07 RX ORDER — HYDROMORPHONE HCL IN WATER/PF 6 MG/30 ML
0.4 PATIENT CONTROLLED ANALGESIA SYRINGE INTRAVENOUS
Status: CANCELLED | OUTPATIENT
Start: 2022-12-07

## 2022-12-07 RX ORDER — DEXAMETHASONE SODIUM PHOSPHATE 4 MG/ML
INJECTION, SOLUTION INTRA-ARTICULAR; INTRALESIONAL; INTRAMUSCULAR; INTRAVENOUS; SOFT TISSUE PRN
Status: DISCONTINUED | OUTPATIENT
Start: 2022-12-07 | End: 2022-12-07

## 2022-12-07 RX ORDER — SIMVASTATIN 40 MG
80 TABLET ORAL EVERY EVENING
Status: DISCONTINUED | OUTPATIENT
Start: 2022-12-07 | End: 2022-12-07 | Stop reason: HOSPADM

## 2022-12-07 RX ORDER — NICOTINE POLACRILEX 4 MG
15-30 LOZENGE BUCCAL
Status: DISCONTINUED | OUTPATIENT
Start: 2022-12-07 | End: 2022-12-07 | Stop reason: HOSPADM

## 2022-12-07 RX ORDER — CEFAZOLIN SODIUM 2 G/100ML
2 INJECTION, SOLUTION INTRAVENOUS EVERY 8 HOURS
Status: DISCONTINUED | OUTPATIENT
Start: 2022-12-07 | End: 2022-12-07 | Stop reason: HOSPADM

## 2022-12-07 RX ORDER — LIDOCAINE 40 MG/G
CREAM TOPICAL
Status: CANCELLED | OUTPATIENT
Start: 2022-12-07

## 2022-12-07 RX ORDER — DEXTROSE MONOHYDRATE 25 G/50ML
25-50 INJECTION, SOLUTION INTRAVENOUS
Status: DISCONTINUED | OUTPATIENT
Start: 2022-12-07 | End: 2022-12-07 | Stop reason: HOSPADM

## 2022-12-07 RX ORDER — ACETAMINOPHEN 325 MG/1
975 TABLET ORAL ONCE
Status: COMPLETED | OUTPATIENT
Start: 2022-12-07 | End: 2022-12-07

## 2022-12-07 RX ORDER — ACETAMINOPHEN 325 MG/1
650 TABLET ORAL EVERY 4 HOURS PRN
Status: CANCELLED | OUTPATIENT
Start: 2022-12-10

## 2022-12-07 RX ORDER — LIDOCAINE HYDROCHLORIDE 10 MG/ML
INJECTION, SOLUTION INFILTRATION; PERINEURAL PRN
Status: DISCONTINUED | OUTPATIENT
Start: 2022-12-07 | End: 2022-12-07

## 2022-12-07 RX ORDER — HYDROMORPHONE HCL IN WATER/PF 6 MG/30 ML
0.2 PATIENT CONTROLLED ANALGESIA SYRINGE INTRAVENOUS
Status: CANCELLED | OUTPATIENT
Start: 2022-12-07

## 2022-12-07 RX ORDER — KETAMINE HYDROCHLORIDE 10 MG/ML
INJECTION INTRAMUSCULAR; INTRAVENOUS PRN
Status: DISCONTINUED | OUTPATIENT
Start: 2022-12-07 | End: 2022-12-07

## 2022-12-07 RX ORDER — FENTANYL CITRATE 50 UG/ML
50 INJECTION, SOLUTION INTRAMUSCULAR; INTRAVENOUS EVERY 5 MIN PRN
Status: DISCONTINUED | OUTPATIENT
Start: 2022-12-07 | End: 2022-12-07 | Stop reason: HOSPADM

## 2022-12-07 RX ORDER — LOSARTAN POTASSIUM 50 MG/1
100 TABLET ORAL DAILY
Status: DISCONTINUED | OUTPATIENT
Start: 2022-12-07 | End: 2022-12-07 | Stop reason: HOSPADM

## 2022-12-07 RX ORDER — HYDROCHLOROTHIAZIDE 25 MG/1
25 TABLET ORAL DAILY
Status: DISCONTINUED | OUTPATIENT
Start: 2022-12-07 | End: 2022-12-07 | Stop reason: HOSPADM

## 2022-12-07 RX ORDER — PROPOFOL 10 MG/ML
INJECTION, EMULSION INTRAVENOUS CONTINUOUS PRN
Status: DISCONTINUED | OUTPATIENT
Start: 2022-12-07 | End: 2022-12-07

## 2022-12-07 RX ORDER — ACETAMINOPHEN 325 MG/1
650 TABLET ORAL EVERY 4 HOURS PRN
Qty: 100 TABLET | Refills: 0 | Status: SHIPPED | OUTPATIENT
Start: 2022-12-07 | End: 2023-12-22

## 2022-12-07 RX ORDER — POLYETHYLENE GLYCOL 3350 17 G/17G
17 POWDER, FOR SOLUTION ORAL DAILY
Status: CANCELLED | OUTPATIENT
Start: 2022-12-08

## 2022-12-07 RX ORDER — CEFAZOLIN SODIUM/WATER 2 G/20 ML
2 SYRINGE (ML) INTRAVENOUS
Status: COMPLETED | OUTPATIENT
Start: 2022-12-07 | End: 2022-12-07

## 2022-12-07 RX ORDER — MAGNESIUM HYDROXIDE 1200 MG/15ML
LIQUID ORAL PRN
Status: DISCONTINUED | OUTPATIENT
Start: 2022-12-07 | End: 2022-12-07 | Stop reason: HOSPADM

## 2022-12-07 RX ORDER — CEFAZOLIN SODIUM/WATER 2 G/20 ML
2 SYRINGE (ML) INTRAVENOUS SEE ADMIN INSTRUCTIONS
Status: DISCONTINUED | OUTPATIENT
Start: 2022-12-07 | End: 2022-12-07 | Stop reason: HOSPADM

## 2022-12-07 RX ORDER — OXYCODONE HYDROCHLORIDE 5 MG/1
10 TABLET ORAL EVERY 4 HOURS PRN
Status: CANCELLED | OUTPATIENT
Start: 2022-12-07

## 2022-12-07 RX ORDER — ONDANSETRON 2 MG/ML
4 INJECTION INTRAMUSCULAR; INTRAVENOUS EVERY 30 MIN PRN
Status: DISCONTINUED | OUTPATIENT
Start: 2022-12-07 | End: 2022-12-07 | Stop reason: HOSPADM

## 2022-12-07 RX ORDER — HYDROMORPHONE HCL IN WATER/PF 6 MG/30 ML
0.4 PATIENT CONTROLLED ANALGESIA SYRINGE INTRAVENOUS EVERY 5 MIN PRN
Status: DISCONTINUED | OUTPATIENT
Start: 2022-12-07 | End: 2022-12-07 | Stop reason: HOSPADM

## 2022-12-07 RX ORDER — FENTANYL CITRATE 50 UG/ML
25 INJECTION, SOLUTION INTRAMUSCULAR; INTRAVENOUS EVERY 5 MIN PRN
Status: DISCONTINUED | OUTPATIENT
Start: 2022-12-07 | End: 2022-12-07 | Stop reason: HOSPADM

## 2022-12-07 RX ORDER — POLYETHYLENE GLYCOL 3350 17 G/17G
1 POWDER, FOR SOLUTION ORAL DAILY
Qty: 7 PACKET | Refills: 0 | Status: SHIPPED | OUTPATIENT
Start: 2022-12-07 | End: 2023-05-26

## 2022-12-07 RX ORDER — SODIUM CHLORIDE, SODIUM LACTATE, POTASSIUM CHLORIDE, CALCIUM CHLORIDE 600; 310; 30; 20 MG/100ML; MG/100ML; MG/100ML; MG/100ML
INJECTION, SOLUTION INTRAVENOUS CONTINUOUS
Status: CANCELLED | OUTPATIENT
Start: 2022-12-07

## 2022-12-07 RX ORDER — OXYCODONE HYDROCHLORIDE 5 MG/1
5-10 TABLET ORAL EVERY 4 HOURS PRN
Qty: 26 TABLET | Refills: 0 | Status: SHIPPED | OUTPATIENT
Start: 2022-12-07 | End: 2023-05-26

## 2022-12-07 RX ORDER — HYDROXYZINE HYDROCHLORIDE 10 MG/1
10 TABLET, FILM COATED ORAL EVERY 6 HOURS PRN
Status: CANCELLED | OUTPATIENT
Start: 2022-12-07

## 2022-12-07 RX ORDER — SODIUM CHLORIDE, SODIUM LACTATE, POTASSIUM CHLORIDE, CALCIUM CHLORIDE 600; 310; 30; 20 MG/100ML; MG/100ML; MG/100ML; MG/100ML
INJECTION, SOLUTION INTRAVENOUS CONTINUOUS
Status: DISCONTINUED | OUTPATIENT
Start: 2022-12-07 | End: 2022-12-07 | Stop reason: HOSPADM

## 2022-12-07 RX ORDER — AMOXICILLIN 250 MG
1-2 CAPSULE ORAL 2 TIMES DAILY
Qty: 30 TABLET | Refills: 0 | Status: SHIPPED | OUTPATIENT
Start: 2022-12-07 | End: 2023-05-26

## 2022-12-07 RX ORDER — ONDANSETRON 4 MG/1
4 TABLET, ORALLY DISINTEGRATING ORAL EVERY 30 MIN PRN
Status: DISCONTINUED | OUTPATIENT
Start: 2022-12-07 | End: 2022-12-07 | Stop reason: HOSPADM

## 2022-12-07 RX ORDER — ASPIRIN 81 MG/1
81 TABLET ORAL 2 TIMES DAILY
Status: CANCELLED | OUTPATIENT
Start: 2022-12-07

## 2022-12-07 RX ORDER — TRANEXAMIC ACID 650 MG/1
1950 TABLET ORAL ONCE
Status: COMPLETED | OUTPATIENT
Start: 2022-12-07 | End: 2022-12-07

## 2022-12-07 RX ORDER — AMLODIPINE BESYLATE 10 MG/1
10 TABLET ORAL DAILY
Status: DISCONTINUED | OUTPATIENT
Start: 2022-12-07 | End: 2022-12-07 | Stop reason: HOSPADM

## 2022-12-07 RX ORDER — ASPIRIN 81 MG/1
81 TABLET ORAL 2 TIMES DAILY
Qty: 60 TABLET | Refills: 0 | Status: SHIPPED | OUTPATIENT
Start: 2022-12-07 | End: 2023-05-26

## 2022-12-07 RX ORDER — FENTANYL CITRATE 50 UG/ML
INJECTION, SOLUTION INTRAMUSCULAR; INTRAVENOUS PRN
Status: DISCONTINUED | OUTPATIENT
Start: 2022-12-07 | End: 2022-12-07

## 2022-12-07 RX ORDER — BISACODYL 10 MG
10 SUPPOSITORY, RECTAL RECTAL DAILY
Qty: 12 SUPPOSITORY | Refills: 0 | Status: SHIPPED | OUTPATIENT
Start: 2022-12-07 | End: 2023-05-26

## 2022-12-07 RX ORDER — ONDANSETRON 2 MG/ML
4 INJECTION INTRAMUSCULAR; INTRAVENOUS EVERY 6 HOURS PRN
Status: CANCELLED | OUTPATIENT
Start: 2022-12-07

## 2022-12-07 RX ADMIN — FENTANYL CITRATE 50 MCG: 50 INJECTION, SOLUTION INTRAMUSCULAR; INTRAVENOUS at 10:49

## 2022-12-07 RX ADMIN — MEPIVACAINE HYDROCHLORIDE 3 ML: 15 INJECTION, SOLUTION EPIDURAL; INFILTRATION at 10:42

## 2022-12-07 RX ADMIN — ACETAMINOPHEN 975 MG: 325 TABLET ORAL at 08:49

## 2022-12-07 RX ADMIN — ONDANSETRON 4 MG: 2 INJECTION INTRAMUSCULAR; INTRAVENOUS at 10:55

## 2022-12-07 RX ADMIN — SODIUM CHLORIDE, POTASSIUM CHLORIDE, SODIUM LACTATE AND CALCIUM CHLORIDE: 600; 310; 30; 20 INJECTION, SOLUTION INTRAVENOUS at 09:22

## 2022-12-07 RX ADMIN — PHENYLEPHRINE HYDROCHLORIDE 0.5 MCG/KG/MIN: 10 INJECTION INTRAVENOUS at 10:58

## 2022-12-07 RX ADMIN — MIDAZOLAM 2 MG: 1 INJECTION INTRAMUSCULAR; INTRAVENOUS at 10:38

## 2022-12-07 RX ADMIN — PROPOFOL 75 MCG/KG/MIN: 10 INJECTION, EMULSION INTRAVENOUS at 10:58

## 2022-12-07 RX ADMIN — DEXAMETHASONE SODIUM PHOSPHATE 4 MG: 4 INJECTION, SOLUTION INTRA-ARTICULAR; INTRALESIONAL; INTRAMUSCULAR; INTRAVENOUS; SOFT TISSUE at 10:55

## 2022-12-07 RX ADMIN — CEFAZOLIN SODIUM 2 G: 2 INJECTION, SOLUTION INTRAVENOUS at 15:31

## 2022-12-07 RX ADMIN — KETAMINE HYDROCHLORIDE 20 MG: 10 INJECTION, SOLUTION INTRAMUSCULAR; INTRAVENOUS at 11:52

## 2022-12-07 RX ADMIN — TRANEXAMIC ACID 1950 MG: 650 TABLET ORAL at 08:49

## 2022-12-07 RX ADMIN — FENTANYL CITRATE 50 MCG: 50 INJECTION, SOLUTION INTRAMUSCULAR; INTRAVENOUS at 10:43

## 2022-12-07 RX ADMIN — Medication 2 G: at 10:38

## 2022-12-07 RX ADMIN — LIDOCAINE HYDROCHLORIDE 4 ML: 10 INJECTION, SOLUTION INFILTRATION; PERINEURAL at 10:58

## 2022-12-07 RX ADMIN — KETAMINE HYDROCHLORIDE 30 MG: 10 INJECTION, SOLUTION INTRAMUSCULAR; INTRAVENOUS at 11:02

## 2022-12-07 ASSESSMENT — ACTIVITIES OF DAILY LIVING (ADL)
ADLS_ACUITY_SCORE: 20
ADLS_ACUITY_SCORE: 29
ADLS_ACUITY_SCORE: 20
ADLS_ACUITY_SCORE: 20

## 2022-12-07 NOTE — ANESTHESIA PREPROCEDURE EVALUATION
Anesthesia Pre-Procedure Evaluation    Patient: Ashutosh Vieira   MRN: [id number] : 1955        Procedure : Procedure(s):  RIGHT DIRECT ANTERIOR TOTAL HIP ARTHROPLASTY          Past Medical History:   Diagnosis Date     Allergic rhinitis      Allergic rhinitis      Diabetes mellitus (H)      Diabetes mellitus (H)      Erectile dysfunction      Erectile dysfunction      Hyperlipemia      Hyperlipemia      Hypertension      Hypertension      Obesity (BMI 35.0-39.9 without comorbidity)      Obesity (BMI 35.0-39.9 without comorbidity)      ANA (obstructive sleep apnea)      ANA (obstructive sleep apnea)      Plantar fasciitis      Plantar fasciitis      Prepatellar bursitis      Prepatellar bursitis      Tinnitus      Tinnitus       Past Surgical History:   Procedure Laterality Date     ORTHOPEDIC SURGERY Left     hip      Allergies   Allergen Reactions     Lisinopril Cough      Social History     Tobacco Use     Smoking status: Former     Types: Cigars, cigarillos or filtered cigars     Smokeless tobacco: Never     Tobacco comments:     cigars   Substance Use Topics     Alcohol use: Yes     Comment: Alcoholic Drinks/day: weekends      Wt Readings from Last 1 Encounters:   22 111.6 kg (246 lb)        Anesthesia Evaluation   Pt has had prior anesthetic.         ROS/MED HX  ENT/Pulmonary:     (+) sleep apnea, uses CPAP,     Neurologic:       Cardiovascular:     (+) Dyslipidemia hypertension-----    METS/Exercise Tolerance:     Hematologic:       Musculoskeletal:       GI/Hepatic:       Renal/Genitourinary:       Endo:     (+) type I DM, Obesity,     Psychiatric/Substance Use:       Infectious Disease:       Malignancy:       Other:            Physical Exam    Airway  airway exam normal      Mallampati: I   TM distance: > 3 FB   Neck ROM: full   Mouth opening: > 3 cm    Respiratory Devices and Support         Dental  no notable dental history         Cardiovascular   cardiovascular exam normal       Rhythm and  rate: regular and normal     Pulmonary   pulmonary exam normal        breath sounds clear to auscultation           OUTSIDE LABS:  CBC:   Lab Results   Component Value Date    WBC 6.6 12/01/2022    WBC 6.6 09/10/2020    HGB 14.3 12/01/2022    HGB 15.5 09/10/2020    HCT 41.8 12/01/2022    HCT 46.1 09/10/2020     12/01/2022     09/10/2020     BMP:   Lab Results   Component Value Date     12/01/2022     09/08/2022    POTASSIUM 3.7 12/01/2022    POTASSIUM 3.4 09/08/2022    CHLORIDE 105 12/01/2022    CHLORIDE 100 09/08/2022    CO2 27 12/01/2022    CO2 31 (H) 09/08/2022    BUN 19.7 12/01/2022    BUN 32.0 (H) 09/08/2022    CR 0.78 12/01/2022    CR 1.07 09/08/2022     (H) 12/07/2022     (H) 12/01/2022     COAGS: No results found for: PTT, INR, FIBR  POC: No results found for: BGM, HCG, HCGS  HEPATIC:   Lab Results   Component Value Date    ALBUMIN 4.3 12/01/2022    PROTTOTAL 6.7 12/01/2022    ALT 19 12/01/2022    AST 21 12/01/2022    ALKPHOS 72 12/01/2022    BILITOTAL 0.4 12/01/2022     OTHER:   Lab Results   Component Value Date    A1C 7.2 (H) 12/01/2022    RUSS 9.0 12/01/2022       Anesthesia Plan    ASA Status:  3   NPO Status:  NPO Appropriate    Anesthesia Type: Spinal.              Consents    Anesthesia Plan(s) and associated risks, benefits, and realistic alternatives discussed. Questions answered and patient/representative(s) expressed understanding.    - Discussed:     - Discussed with:  Patient, Spouse      - Extended Intubation/Ventilatory Support Discussed: No.      - Patient is DNR/DNI Status: No    Use of blood products discussed: No .     Postoperative Care    Pain management: Oral pain medications.   PONV prophylaxis: Ondansetron (or other 5HT-3), Background Propofol Infusion, Dexamethasone or Solumedrol     Comments:                Hu Sanchez MD

## 2022-12-07 NOTE — PHARMACY-ADMISSION MEDICATION HISTORY
Pharmacy Note - Admission Medication History    Pertinent Provider Information:    ______________________________________________________________________    Prior To Admission (PTA) med list completed and updated in EMR.       PTA Med List   Medication Sig Last Dose     amLODIPine (NORVASC) 10 MG tablet Take 1 tablet (10 mg) by mouth daily 12/7/2022 at 0530     glipiZIDE (GLUCOTROL) 5 MG tablet Take 1 tablet (5 mg) by mouth 2 times daily (before meals) 12/6/2022     hydrochlorothiazide (HYDRODIURIL) 25 MG tablet Take 1 tablet (25 mg) by mouth daily 12/6/2022     losartan (COZAAR) 100 MG tablet Take 1 tablet (100 mg) by mouth daily 12/6/2022     metFORMIN (GLUCOPHAGE) 1000 MG tablet Take 1 tablet (1,000 mg) by mouth 2 times daily (with meals) 12/6/2022     metoprolol succinate ER (TOPROL XL) 25 MG 24 hr tablet Take 1 tablet (25 mg) by mouth daily 12/7/2022 at 0530     simvastatin (ZOCOR) 80 MG tablet Take 1 tablet (80 mg) by mouth every evening 12/6/2022       Information source(s): Patient and CareEverywhere/SureScripts    Method of interview communication: in-person    Patient was asked about OTC/herbal products specifically.  PTA med list reflects this.    Based on the pharmacist's assessment, the PTA med list information appears reliable    Allergies were reviewed, assessed, and updated with the patient.      Patient did not bring any medications to the hospital and can't retrieve from home. No multi-dose medications are available for use during hospital stay.      Thank you for the opportunity to participate in the care of this patient.      Ricky Arthur RPH     12/7/2022     9:02 AM

## 2022-12-07 NOTE — TREATMENT PLAN
Orthopedic Surgery Pre-Op Plan: Ashutosh Vieira  pre-op review. This is NOT an H&P   Surgeon: Dr. Londono    [facility]: Jeanie  Name of Surgery: Right Direct Anterior Total Hip Arthroplasty  Date of Surgery: 12/7/22  H&P: Completed on 12/1/22 by Raghavendra Zafar NP at [facility].   History of ASA, NSAIDS, vitamin and/or herbal supplements within 10 days: No  History of blood thinners: No    Plan:   1) Discharge Plan: Home morning of POD 1 with assist of Spouse. Please see Discharge Planning section near bottom of this note for further details.     2) Hypercholesterolemia: On simvastatin.    3) Hypertension: Appears well controlled on amlodipine, hydrochlorothiazide, losartan, and metoprolol.  Patient instructed to hold hydrochlorothiazide and losartan on the morning of surgery but should continue taking amlodipine and metoprolol.    4) Obstructive Sleep Apnea: CPAP.  Patient reminded to bring CPAP machine to the hospital and to use it whenever sleeping or napping.    5) Type 2 Diabetes Mellitus: Fair recent control.  Most recent hemoglobin A1c 7.2 on 12/1/2022.  On metformin and glipizide. I recommend blood glucose checks at least three times a day and at bedtime during hospital stay. Goal BG < 180 to decrease risk for infection and wound healing complications. Nursing to please notify Hospitalist if BG > 180.  Patient may also benefit from short-term coverage with sliding scale insulin during hospital stay.  Will defer to Hospitalist for management of this.    6) Obesity: BMI 32.6, Wt: 244 lbs.  I recommend continued efforts at safe weight loss following recovery from surgery.    Patient appears medically optimized for upcoming surgery. I would recommend Hospitalist Consult to assist with medical management. Please call me below with any questions on this patient.       Review of Systems Notable for: Hypercholesterolemia, hypertension, obstructive sleep apnea-on CPAP, type 2 diabetes  mellitus-fair recent control, obesity.    Past Medical History:   Past Medical History:   Diagnosis Date     Allergic rhinitis      Allergic rhinitis      Diabetes mellitus (H)      Diabetes mellitus (H)      Erectile dysfunction      Erectile dysfunction      Hyperlipemia      Hyperlipemia      Hypertension      Hypertension      Obesity (BMI 35.0-39.9 without comorbidity)      Obesity (BMI 35.0-39.9 without comorbidity)      ANA (obstructive sleep apnea)      ANA (obstructive sleep apnea)      Plantar fasciitis      Plantar fasciitis      Prepatellar bursitis      Prepatellar bursitis      Tinnitus      Tinnitus      Past Surgical History:   Procedure Laterality Date     ORTHOPEDIC SURGERY Left     hip       Current Medications:  Patient's Medications   New Prescriptions    No medications on file   Previous Medications    ACCU-CHEK SMARTVIEW TEST STRIP STRIPS    [ACCU-CHEK SMARTVIEW TEST STRIP STRIPS] USE 1 STRIP TO CHECK GLUCOSE TWICE DAILY    AMLODIPINE (NORVASC) 10 MG TABLET    Take 1 tablet (10 mg) by mouth daily    GLIPIZIDE (GLUCOTROL) 5 MG TABLET    Take 1 tablet (5 mg) by mouth 2 times daily (before meals)    HYDROCHLOROTHIAZIDE (HYDRODIURIL) 25 MG TABLET    Take 1 tablet (25 mg) by mouth daily    LANCETS (ACCU-CHEK MULTICLIX LANCET) MISC    [LANCETS (ACCU-CHEK MULTICLIX LANCET) MISC] Use 100 each As Directed 2 (two) times a day.    LOSARTAN (COZAAR) 100 MG TABLET    Take 1 tablet (100 mg) by mouth daily    METFORMIN (GLUCOPHAGE) 1000 MG TABLET    Take 1 tablet (1,000 mg) by mouth 2 times daily (with meals)    METOPROLOL SUCCINATE ER (TOPROL XL) 25 MG 24 HR TABLET    Take 1 tablet (25 mg) by mouth daily    SIMVASTATIN (ZOCOR) 80 MG TABLET    Take 1 tablet (80 mg) by mouth every evening   Modified Medications    No medications on file   Discontinued Medications    ASPIRIN 81 MG EC TABLET    Take 81 mg by mouth daily    INSULIN LISPRO (HUMALOG KWIKPEN) 100 UNIT/ML (1 UNIT DIAL) KWIKPEN    Inject Subcutaneous  3 times daily (before meals)    TRIAMTERENE-HCTZ (MAXZIDE-25) 37.5-25 MG TABLET    Take 1 tablet by mouth daily       ALLERGIES:  Allergies   Allergen Reactions     Lisinopril Cough       Social History  Social History     Tobacco Use     Smoking status: Former     Types: Cigars, cigarillos or filtered cigars     Smokeless tobacco: Never     Tobacco comments:     cigars   Substance Use Topics     Alcohol use: Yes     Comment: Alcoholic Drinks/day: weekends     Drug use: Never       Any Abnormal Recent Diagnostics? Yes  Hemoglobin A1c 7.2 on 12/1/2022: Shows fair recent control of type 2 diabetes on metformin and glipizide.  Blood glucose 122 on 12/1/2022: We will monitor BG's closely during hospital stay.  Goal BG <180.    Discharge Planning:   Discharge plan as reported by patient:      Home morning of POD 1 with assist of Spouse    11/23/22 [id number]   Discharge Planning   Patient/Family Anticipates Transition to home with family   Living Arrangements   People in Home spouse   Type of Residence Private Residence   Is your private residence a single family home or apartment? Single family home   Stair Railings, Within Home, Primary railings safe and in good condition;railing on right side (ascending)   Once home, are you able to live on one level? Yes   Bathroom Shower/Tub Tub/Shower unit   Equipment Currently Used at Home cane, straight;raised toilet seat;walker, standard   Support System   Support Systems Spouse/Significant Other   Do you have someone available to stay with you one or two nights once you are home? Yes       SAURABH Erickson, CNP   Advanced Practice Nurse Navigator- Orthopedics  [facility]   Phone: [phone]

## 2022-12-07 NOTE — PROGRESS NOTES
1400  Pt has been holding in PACU awaiting room availability.  Pt is very motivated to go home today, and recently had a Left JUNAID and went home the same day after that one as well.      Ambulated well to BR with just SBA of two, voided, is up in chair and eating.    PT notified and has completed their consult, pt has done stairs, and received teaching from them.    Dr. Londono notified of possible discharge from PACU holding, due to pt progressing quite well.  This is okay per Dr. Londono.    Second dose of Ancef will be given at 1530, per Pharmacy.   Awaiting Ortho PA visit and then will complete nursing teaching.

## 2022-12-07 NOTE — OP NOTE
Operative Note    Name:  Ashutosh Vieira  Location: [facility] Main OR  Procedure Date:  12/7/2022  PCP:  Raghavendra Zafar      Procedure(s):  RIGHT DIRECT ANTERIOR TOTAL HIP ARTHROPLASTY     Implant Name Type Inv. Item Serial No.  Lot No. LRB No. Used Action   SHELL ACETABULAR 54E SOLIDBACK TRITANIUM - [id number] Total Joint Component/Insert SHELL ACETABULAR 54E SOLIDBACK TRITANIUM  [employer] [id number] Right 1 Implanted   INSERT ACE 36MM 0DEG X3 723-00-36E - [id number] Total Joint Component/Insert INSERT ACE 36MM 0DEG X3 723-00-36E  [employer] [id number] Right 1 Implanted   IMP HEAD FEMORAL STRK BIOLOX DELTA CERAMIC 36MM +0MM - [id number] Total Joint Component/Insert IMP HEAD FEMORAL STRK BIOLOX DELTA CERAMIC 36MM +0MM  [employer] [id number] Right 1 Implanted   IMP STEM FEMORAL HIP STRK ACCOLADE II 127DEG SZ 5 4267-5909 - [id number] Total Joint Component/Insert IMP STEM FEMORAL HIP STRK ACCOLADE II 127DEG SZ 5 [id number]  [employer] [id number] Right 1 Implanted       Pre-Procedure Diagnosis:  Osteoarthritis of right hip [M16.11]     Post-Procedure Diagnosis:    same    Surgeon(s):  Samy Rosario PA-C Wickum, Daren J, MD    Assistants:  Required for patient positioning, intraoperative retraction, patient safety, and wound closure.    Anesthesia Type:  Spinal     Findings:  Arthritis    Operative Report:      After satisfactory infiltration of spinal block anesthetic, the patient was placed supine on the table. Both lower extremities were then prepped and draped in usual sterile fashion. A timeout was then conducted to ensure proper surgical site, after which a longitudinal incision was made just lateral and distal to the ASIS. The tensor fascia was then incised, and the muscle belly retracted lateral. The circumflex vessels were cauterized and divided. Blunt retractors then placed over the superior and inferior capsule, and an anterior capsulectomy performed. The femoral  neck cut was then made per preoperative templating. The disc of bone was removed from the femoral neck. The femoral head was removed with a corkscrew reamer. The femur was retracted posteriorly exposing the acetabulum. All soft tissues were removed from the acetabulum, and the acetabulum sequentially reamed to accommodate the shell. We reamed line to line. Impaction of the shell showed it had excellent scratch fit. The dome hole cover was placed. At this point an intraoperative standardized cocktail of was infiltrated around the periarticular soft tissues. The polythene liner was then snapped into place. The proximal femur was then delivered into the wound through a series of proximal femoral capsular releases, hip hyperextension, adduction, and external rotation. The femur was then sequentially broached to accommodate a size 5 stem, high offset is what we templated. Trial showed a +0  head to be the most appropriate for leg lengths.  There was  excellent stability. Trials were then removed, the wound copiously irrigated, and the stem impacted followed by the head. The hip was reduced, checked for interposition and impingement. The wound thoroughly irrigated once more. Bleeders cauterized. The wound was then closed in layers: 2-0 Vicryl for the tensor fascia, 2-0 Vicryl for the subcutaneous tendinous tissues, 3-0 Monocryl for the skin, followed by Dermabond. Sterile dressings were applied.  The patient was then awakened and taken to the [facility] stable.  The patient tolerated the procedure well. There were no apparent intraoperative complications. The plan is to have the patient weight-bear as tolerated, no postoperative hip precautions or restrictions. We'll place the patient on a multimodal DVT prophylaxis protocol including early ambulation, mechanical plexus, a chemical prophylaxis ×1 month      Estimated Blood Loss:   400cc         Drains:        Complications:    None    Navin Londono MD     Date: 12/7/2022   Time: 12:05 PM

## 2022-12-07 NOTE — ANESTHESIA POSTPROCEDURE EVALUATION
Patient: Ashutosh Vieira    Procedure: Procedure(s):  RIGHT DIRECT ANTERIOR TOTAL HIP ARTHROPLASTY       Anesthesia Type:  Spinal    Note:  Disposition: Outpatient   Postop Pain Control: Uneventful            Sign Out: Well controlled pain   PONV: No   Neuro/Psych: Uneventful            Sign Out: Acceptable/Baseline neuro status   Airway/Respiratory: Uneventful            Sign Out: Acceptable/Baseline resp. status   CV/Hemodynamics: Uneventful            Sign Out: Acceptable CV status; No obvious hypovolemia; No obvious fluid overload   Other NRE: NONE   DID A NON-ROUTINE EVENT OCCUR? No           Last vitals:  Vitals Value Taken Time   /84 12/07/22 1400   Temp 36.4  C (97.5  F) 12/07/22 1300   Pulse 68 12/07/22 1400   Resp 18 12/07/22 1400   SpO2 97 % 12/07/22 1400       Electronically Signed By: Hu Sanchez MD  December 7, 2022  3:34 PM

## 2022-12-07 NOTE — PROGRESS NOTES
12/07/22 1400   Appointment Info   Signing Clinician's Name / Credentials (PT) Angie Burleson, PT, DPT   Quick Adds   Quick Adds Certification   Living Environment   People in Home spouse   Current Living Arrangements house   Home Accessibility stairs to enter home;stairs within home   Number of Stairs, Main Entrance 1   Number of Stairs, Within Home, Primary greater than 10 stairs   Stair Railings, Within Home, Primary railing on right side (ascending)   Self-Care   Equipment Currently Used at Home none   Fall history within last six months no   Activity/Exercise/Self-Care Comment Has cane and FWW   General Information   Onset of Illness/Injury or Date of Surgery 12/07/22   Referring Physician Dr. Navin Londono   Weight-Bearing Status - LLE full weight-bearing   Weight-Bearing Status - RLE weight-bearing as tolerated   Transfers   Transfers sit-stand transfer   Maintains Weight-bearing Status (Transfers) able to maintain   Sit-Stand Transfer   Sit-Stand [place] (Transfers) supervision;verbal cues   Assistive Device (Sit-Stand Transfers) walker, front-wheeled   Gait/Stairs (Locomotion)   [place] Level (Gait) contact guard;verbal cues   Assistive Device (Gait) walker, front-wheeled   Distance in Feet 10   Distance in Feet (Gait) 150x2   Pattern (Gait) step-through   Deviations/Abnormal Patterns (Gait) makenzie decreased;gait speed decreased;antalgic   Maintains Weight-bearing Status (Gait) able to maintain   Clinical Impression   Criteria for Skilled Therapeutic Intervention Yes, treatment indicated   PT Diagnosis (PT) Impaired functional mobility   Influenced by the following impairments weakness, pain   Functional limitations due to impairments transfers, gait, stair   Clinical Presentation (PT Evaluation Complexity) Stable/Uncomplicated   Clinical Presentation Rationale Pt presents as medically diagnosed   Clinical Decision Making (Complexity) low complexity   Planned Therapy Interventions (PT) gait  training;home exercise program;patient/family education;transfer training;stair training   Anticipated Equipment Needs at Discharge (PT) walker, rolling   Risk & Benefits of therapy have been explained evaluation/treatment results reviewed;care plan/treatment goals reviewed;patient   PT Total Evaluation Time   PT Eval, Low Complexity Minutes (95570) 10   Therapy Certification   Start of care date 12/07/22   Certification date from 12/07/22   Certification date to 12/28/22   Medical Diagnosis S/P R JUNAID   Physical Therapy Goals   PT Frequency One time eval and treatment only   PT Predicted Duration/Target Date for Goal Attainment 12/07/22   PT Goals Gait;Stairs;PT Goal 1   PT: Gait Modified independent;Rolling walker;100 feet;Goal Met   PT: Stairs Supervision/stand-by assist;Greater than 10 stairs;Rail on right;Goal Met   PT: Goal 1 Independent with TH HEP  (Goal met)   Interventions   Interventions Quick Adds Gait Training;Therapeutic Activity;Therapeutic Procedure   Therapeutic Procedure/Exercise   Ther. Procedure: strength, endurance, ROM, flexibillity Minutes (76667) 10   Symptoms Noted During/After Treatment none   Treatment Detail/Skilled Intervention TH exercises x 5 reps with R LE, independent after initial verbal cueing for exercise technique. Reviewed icing and elevating LEs   Therapeutic Activity   Symptoms Noted During/After Treatment None   Treatment Detail/Skilled Intervention Sit<>stand with FWW, mod I   Gait Training   Gait Training Minutes (70101) 10   Symptoms Noted During/After Treatment (Gait Training) none   Treatment Detail/Skilled Intervention verbal cueing for posture, LE advancement and device advancement. Stairs: 12 stairs with right rail, SBA, nonreciprocal pattern, verbal cueing for patterning and safety. Education and instruction on progression to cane at home. Education on ambulation post-op   [place] Level (Gait Training) independent   Physical Assistance Level (Gait Training)  verbal cues   Weight Bearing (Gait Training) weight-bearing as tolerated   Assistive Device (Gait Training) rolling walker   Pattern Analysis (Gait Training) swing-through gait   Gait Analysis Deviations decreased makenzie;decreased step length   PT Discharge Planning   PT Discharge Recommendation (DC Rec) (S)  home with assist   PT Rationale for DC Rec Ambulating and negotiating stairs safely, spouse for support at home   PT Brief overview of current status Amb 150ft with FWW, mod I   [facility]  OUTPATIENT PHYSICAL THERAPY EVALUATION  PLAN OF TREATMENT FOR OUTPATIENT REHABILITATION  (COMPLETE FOR INITIAL CLAIMS ONLY)  Patient's Last Name, First Name, M.I.  YOB: 1955  Ashutosh Vieira                        Provider's Name  [facility] Medical Record No.  [id number]                             Onset Date:  12/07/22   Start of Care Date:  (P) 12/07/22   Type:     _X_PT   ___OT   ___SLP Medical Diagnosis:  (P) S/P R JUNAID              PT Diagnosis:  (P) Impaired functional mobility Visits from SOC:  1     See note for plan of treatment, functional goals and certification details    I CERTIFY THE NEED FOR THESE SERVICES FURNISHED UNDER        THIS PLAN OF TREATMENT AND WHILE UNDER MY CARE     (Physician co-signature of this document indicates review and certification of the therapy plan).

## 2022-12-07 NOTE — INTERVAL H&P NOTE
"I have reviewed the surgical (or preoperative) H&P that is linked to this encounter, and examined the patient. There are no significant changes    Clinical Conditions Present on Arrival:  Clinically Significant Risk Factors Present on Admission                    # DMII: A1C = 7.2 % (Ref range: 0.0 - 5.6 %) within past 3 months  # Obesity: Estimated body mass index is 32.64 kg/m  as calculated from the following:    Height as of 12/1/22: 1.842 m (6' 0.5\").    Weight as of this encounter: 110.7 kg (244 lb).       "

## 2022-12-07 NOTE — ANESTHESIA CARE TRANSFER NOTE
Patient: Ashutosh Vieira    Procedure: Procedure(s):  RIGHT DIRECT ANTERIOR TOTAL HIP ARTHROPLASTY       Diagnosis: Osteoarthritis of right hip [M16.11]  Diagnosis Additional Information: No value filed.    Anesthesia Type:   Spinal     Note:    Oropharynx: oropharynx clear of all foreign objects and spontaneously breathing  Level of Consciousness: awake  Oxygen Supplementation: face mask  Level of Supplemental Oxygen (L/min / FiO2): 8  Independent Airway: airway patency satisfactory and stable  Dentition: dentition unchanged  Vital Signs Stable: post-procedure vital signs reviewed and stable  Report to RN Given: handoff report given  Patient transferred to: PACU    Handoff Report: Identifed the Patient, Identified the Reponsible Provider, Reviewed the pertinent medical history, Discussed the surgical course, Reviewed Intra-OP anesthesia mangement and issues during anesthesia, Set expectations for post-procedure period and Allowed opportunity for questions and acknowledgement of understanding      Vitals:  Vitals Value Taken Time   /59 12/07/22 1230   Temp     Pulse 62 12/07/22 1239   Resp 26 12/07/22 1239   SpO2 95 % 12/07/22 1239   Vitals shown include unvalidated device data.    Electronically Signed By: SAURABH Huang CRNA  December 7, 2022  12:40 PM

## 2022-12-07 NOTE — PROGRESS NOTES
Physical Therapy Discharge Summary    Reason for therapy discharge:    All goals and outcomes met, no further needs identified.    Progress towards therapy goal(s). See goals on Care Plan in [facility] electronic health record for goal details.  Goals met    Therapy recommendation(s):    Continue home exercise program.

## 2022-12-07 NOTE — ANESTHESIA PROCEDURE NOTES
"Intrathecal injection Procedure Note    Pre-Procedure   Staff -        Anesthesiologist:  Hu Sanchez MD       Performed By: anesthesiologist       Location: OR       Procedure Start/Stop Times: 12/7/2022 10:42 AM and 12/7/2022 10:50 AM       Pre-Anesthestic Checklist: patient identified, IV checked, risks and benefits discussed, informed consent, monitors and equipment checked, pre-op evaluation, at physician/surgeon's request and post-op pain management  Timeout:       Correct Patient: Yes        Correct Procedure: Yes        Correct Site: Yes        Correct Position: Yes   Procedure Documentation  Procedure: intrathecal injection       Patient Position: sitting       Skin prep: Betadine       Insertion Site: L2-3. (midline approach).       Needle Gauge: 25.        Needle Length (Inches): 5        Spinal Needle Type: Quincke       Introducer used       Introducer: 20 G       # of attempts: 3 and  # of redirects:  3    Assessment/Narrative         Paresthesias: No.       CSF fluid: clear.    Medication(s) Administered   1.5% Mepivacaine PF (Intrathecal) - Intrathecal   3 mL - 12/7/2022 10:42:00 AM  Medication Administration Time: 12/7/2022 10:42 AM      FOR [facility] ([facility] [place] ONLY:   Pain Team Contact information: please page the Pain Team Via MIGS[employer]. Search \"Pain\". During daytime hours, please page the attending first. At night please page the resident first.    "

## 2022-12-20 ENCOUNTER — TRANSFERRED RECORDS (OUTPATIENT)
Dept: HEALTH INFORMATION MANAGEMENT | Facility: CLINIC | Age: 67
End: 2022-12-20

## 2023-01-17 ENCOUNTER — TRANSFERRED RECORDS (OUTPATIENT)
Dept: HEALTH INFORMATION MANAGEMENT | Facility: CLINIC | Age: 68
End: 2023-01-17

## 2023-02-28 ENCOUNTER — TRANSFERRED RECORDS (OUTPATIENT)
Dept: HEALTH INFORMATION MANAGEMENT | Facility: CLINIC | Age: 68
End: 2023-02-28
Payer: COMMERCIAL

## 2023-02-28 LAB — RETINOPATHY: NEGATIVE

## 2023-03-14 DIAGNOSIS — I10 ESSENTIAL HYPERTENSION: ICD-10-CM

## 2023-03-15 RX ORDER — HYDROCHLOROTHIAZIDE 25 MG/1
TABLET ORAL
Qty: 90 TABLET | Refills: 2 | Status: SHIPPED | OUTPATIENT
Start: 2023-03-15 | End: 2023-05-26

## 2023-03-15 NOTE — TELEPHONE ENCOUNTER
"Last Written Prescription Date:  9/8/22  Last Fill Quantity: 90,  # refills: 3   Last office visit provider:  12/1/22     Requested Prescriptions   Pending Prescriptions Disp Refills     hydrochlorothiazide (HYDRODIURIL) 25 MG tablet [Pharmacy Med Name: hydroCHLOROthiazide 25 MG Oral Tablet] 90 tablet 0     Sig: Take 1 tablet by mouth once daily       Diuretics (Including Combos) Protocol Passed - 3/14/2023  4:52 PM        Passed - Blood pressure under 140/90 in past 12 months     BP Readings from Last 3 Encounters:   12/07/22 122/80   12/01/22 120/84   09/08/22 116/86                 Passed - Recent (12 mo) or future (30 days) visit within the authorizing provider's specialty     Patient has had an office visit with the authorizing provider or a provider within the authorizing providers department within the previous 12 mos or has a future within next 30 days. See \"Patient Info\" tab in inbasket, or \"Choose Columns\" in Meds & Orders section of the refill encounter.              Passed - Medication is active on med list        Passed - Patient is age 18 or older        Passed - Normal serum creatinine on file in past 12 months     Recent Labs   Lab Test 12/01/22  1225   CR 0.78              Passed - Normal serum potassium on file in past 12 months     Recent Labs   Lab Test 12/01/22  1225   POTASSIUM 3.7                    Passed - Normal serum sodium on file in past 12 months     Recent Labs   Lab Test 12/01/22  1225                      Kimberly Gardner, RN 03/15/23 2:19 PM  "

## 2023-04-23 ENCOUNTER — HEALTH MAINTENANCE LETTER (OUTPATIENT)
Age: 68
End: 2023-04-23

## 2023-05-20 ASSESSMENT — ENCOUNTER SYMPTOMS
MYALGIAS: 1
PARESTHESIAS: 0
DIZZINESS: 0
CONSTIPATION: 0
COUGH: 0
DYSURIA: 0
ABDOMINAL PAIN: 0
HEARTBURN: 0
SHORTNESS OF BREATH: 0
NAUSEA: 0
EYE PAIN: 0
HEMATURIA: 0
DIARRHEA: 1
CHILLS: 0
NERVOUS/ANXIOUS: 0
FREQUENCY: 0
ARTHRALGIAS: 1
HEMATOCHEZIA: 0
JOINT SWELLING: 0
PALPITATIONS: 0
HEADACHES: 0
WEAKNESS: 0
SORE THROAT: 0
FEVER: 0

## 2023-05-20 ASSESSMENT — ACTIVITIES OF DAILY LIVING (ADL): CURRENT_FUNCTION: NO ASSISTANCE NEEDED

## 2023-05-26 ENCOUNTER — OFFICE VISIT (OUTPATIENT)
Dept: FAMILY MEDICINE | Facility: CLINIC | Age: 68
End: 2023-05-26
Payer: COMMERCIAL

## 2023-05-26 VITALS
OXYGEN SATURATION: 96 % | HEIGHT: 72 IN | HEART RATE: 48 BPM | TEMPERATURE: 98.1 F | DIASTOLIC BLOOD PRESSURE: 70 MMHG | SYSTOLIC BLOOD PRESSURE: 110 MMHG | WEIGHT: 224.4 LBS | BODY MASS INDEX: 30.4 KG/M2 | RESPIRATION RATE: 16 BRPM

## 2023-05-26 DIAGNOSIS — E11.9 TYPE 2 DIABETES MELLITUS WITHOUT COMPLICATION, WITHOUT LONG-TERM CURRENT USE OF INSULIN (H): ICD-10-CM

## 2023-05-26 DIAGNOSIS — Z12.5 SCREENING FOR PROSTATE CANCER: ICD-10-CM

## 2023-05-26 DIAGNOSIS — E78.00 PURE HYPERCHOLESTEROLEMIA: ICD-10-CM

## 2023-05-26 DIAGNOSIS — R00.1 BRADYCARDIA: ICD-10-CM

## 2023-05-26 DIAGNOSIS — I10 ESSENTIAL HYPERTENSION: ICD-10-CM

## 2023-05-26 DIAGNOSIS — Z00.00 ROUTINE GENERAL MEDICAL EXAMINATION AT A HEALTH CARE FACILITY: Primary | ICD-10-CM

## 2023-05-26 DIAGNOSIS — R21 RASH: ICD-10-CM

## 2023-05-26 LAB
ALBUMIN SERPL BCG-MCNC: 4.5 G/DL (ref 3.5–5.2)
ALP SERPL-CCNC: 66 U/L (ref 40–129)
ALT SERPL W P-5'-P-CCNC: 12 U/L (ref 10–50)
ANION GAP SERPL CALCULATED.3IONS-SCNC: 11 MMOL/L (ref 7–15)
AST SERPL W P-5'-P-CCNC: 19 U/L (ref 10–50)
ATRIAL RATE - MUSE: 61 BPM
BILIRUB SERPL-MCNC: 0.6 MG/DL
BUN SERPL-MCNC: 14.8 MG/DL (ref 8–23)
CALCIUM SERPL-MCNC: 9.1 MG/DL (ref 8.8–10.2)
CHLORIDE SERPL-SCNC: 103 MMOL/L (ref 98–107)
CHOLEST SERPL-MCNC: 124 MG/DL
CREAT SERPL-MCNC: 0.83 MG/DL (ref 0.67–1.17)
DEPRECATED HCO3 PLAS-SCNC: 28 MMOL/L (ref 22–29)
DIASTOLIC BLOOD PRESSURE - MUSE: NORMAL MMHG
GFR SERPL CREATININE-BSD FRML MDRD: >90 ML/MIN/1.73M2
GLUCOSE SERPL-MCNC: 114 MG/DL (ref 70–99)
HBA1C MFR BLD: 6.9 % (ref 0–5.6)
HDLC SERPL-MCNC: 36 MG/DL
INTERPRETATION ECG - MUSE: NORMAL
LDLC SERPL CALC-MCNC: 70 MG/DL
NONHDLC SERPL-MCNC: 88 MG/DL
P AXIS - MUSE: 16 DEGREES
POTASSIUM SERPL-SCNC: 3.9 MMOL/L (ref 3.4–5.3)
PR INTERVAL - MUSE: 220 MS
PROT SERPL-MCNC: 6.9 G/DL (ref 6.4–8.3)
PSA SERPL DL<=0.01 NG/ML-MCNC: 0.86 NG/ML (ref 0–4.5)
QRS DURATION - MUSE: 106 MS
QT - MUSE: 430 MS
QTC - MUSE: 432 MS
R AXIS - MUSE: -16 DEGREES
SODIUM SERPL-SCNC: 142 MMOL/L (ref 136–145)
SYSTOLIC BLOOD PRESSURE - MUSE: NORMAL MMHG
T AXIS - MUSE: 9 DEGREES
TRIGL SERPL-MCNC: 89 MG/DL
VENTRICULAR RATE- MUSE: 61 BPM

## 2023-05-26 PROCEDURE — 93010 ELECTROCARDIOGRAM REPORT: CPT | Performed by: INTERNAL MEDICINE

## 2023-05-26 PROCEDURE — 36415 COLL VENOUS BLD VENIPUNCTURE: CPT | Performed by: NURSE PRACTITIONER

## 2023-05-26 PROCEDURE — 80053 COMPREHEN METABOLIC PANEL: CPT | Performed by: NURSE PRACTITIONER

## 2023-05-26 PROCEDURE — 99397 PER PM REEVAL EST PAT 65+ YR: CPT | Mod: 25 | Performed by: NURSE PRACTITIONER

## 2023-05-26 PROCEDURE — 91312 COVID-19 BIVALENT 12+ (PFIZER): CPT | Performed by: NURSE PRACTITIONER

## 2023-05-26 PROCEDURE — 83036 HEMOGLOBIN GLYCOSYLATED A1C: CPT | Performed by: NURSE PRACTITIONER

## 2023-05-26 PROCEDURE — 99214 OFFICE O/P EST MOD 30 MIN: CPT | Mod: 25 | Performed by: NURSE PRACTITIONER

## 2023-05-26 PROCEDURE — 80061 LIPID PANEL: CPT | Performed by: NURSE PRACTITIONER

## 2023-05-26 PROCEDURE — 93005 ELECTROCARDIOGRAM TRACING: CPT | Performed by: NURSE PRACTITIONER

## 2023-05-26 PROCEDURE — G0103 PSA SCREENING: HCPCS | Performed by: NURSE PRACTITIONER

## 2023-05-26 PROCEDURE — 0121A COVID-19 BIVALENT 12+ (PFIZER): CPT | Performed by: NURSE PRACTITIONER

## 2023-05-26 RX ORDER — AMLODIPINE BESYLATE 10 MG/1
10 TABLET ORAL DAILY
Qty: 90 TABLET | Refills: 3 | Status: SHIPPED | OUTPATIENT
Start: 2023-05-26 | End: 2023-12-22

## 2023-05-26 RX ORDER — SIMVASTATIN 80 MG
80 TABLET ORAL EVERY EVENING
Qty: 90 TABLET | Refills: 3 | Status: SHIPPED | OUTPATIENT
Start: 2023-05-26 | End: 2023-12-22

## 2023-05-26 RX ORDER — TRIAMCINOLONE ACETONIDE 1 MG/G
CREAM TOPICAL 2 TIMES DAILY
Qty: 30 G | Refills: 0 | Status: SHIPPED | OUTPATIENT
Start: 2023-05-26 | End: 2023-12-22

## 2023-05-26 ASSESSMENT — ENCOUNTER SYMPTOMS
HEMATOCHEZIA: 0
ARTHRALGIAS: 1
SORE THROAT: 0
DYSURIA: 0
ABDOMINAL PAIN: 0
NAUSEA: 0
JOINT SWELLING: 0
WEAKNESS: 0
COUGH: 0
FREQUENCY: 0
PARESTHESIAS: 0
CHILLS: 0
HEMATURIA: 0
HEADACHES: 0
HEARTBURN: 0
PALPITATIONS: 0
MYALGIAS: 1
EYE PAIN: 0
CONSTIPATION: 0
NERVOUS/ANXIOUS: 0
FEVER: 0
DIARRHEA: 1
DIZZINESS: 0
SHORTNESS OF BREATH: 0

## 2023-05-26 ASSESSMENT — PAIN SCALES - GENERAL: PAINLEVEL: NO PAIN (0)

## 2023-05-26 ASSESSMENT — ACTIVITIES OF DAILY LIVING (ADL): CURRENT_FUNCTION: NO ASSISTANCE NEEDED

## 2023-05-26 NOTE — PATIENT INSTRUCTIONS
Let's stop the hydrochlorothiazide and see what happens with the blood pressure.  If it creeps up above the 140/90, go ahead and restart and let me know.    EKG looks okay.  We will continue to keep an eye on the heart rate as you age.    COVID booster given today.    Triamcinolone cream sent for the rash.  Let me know if this fails to improve.        Preventive Health Recommendations:     See your health care provider every year to  Review health changes.   Discuss preventive care.    Review your medicines if your doctor has prescribed any.    Talk with your health care provider about whether you should have a test to screen for prostate cancer (PSA).  Every 3 years, have a diabetes test (fasting glucose). If you are at risk for diabetes, you should have this test more often.  Every 5 years, have a cholesterol test. Have this test more often if you are at risk for high cholesterol or heart disease.   Every 10 years, have a colonoscopy. Or, have a yearly FIT test (stool test). These exams will check for colon cancer.  Talk to with your health care provider about screening for Abdominal Aortic Aneurysm if you have a family history of AAA or have a history of smoking.    Shots:   Get a flu shot each year.   Get a tetanus shot every 10 years.   Talk to your doctor about your pneumonia vaccines. There are now two you should receive - Pneumovax (PPSV 23) and Prevnar (PCV 13).   Talk to your pharmacist about a shingles vaccine.   Talk to your doctor about the hepatitis B vaccine.  Nutrition:   Eat at least 5 servings of fruits and vegetables each day.   Eat whole-grain bread, whole-wheat pasta and brown rice instead of white grains and rice.   Get adequate Calcium and Vitamin D.   Lifestyle  Exercise for at least 150 minutes a week (30 minutes a day, 5 days a week). This will help you control your weight and prevent disease.   Limit alcohol to one drink per day.   No smoking.   Wear sunscreen to prevent skin cancer.  See  your dentist every six months for an exam and cleaning.  See your eye doctor every 1 to 2 years to screen for conditions such as glaucoma, macular degeneration, cataracts, etc.    Personalized Prevention Plan  You are due for the preventive services outlined below.  Your care team is available to assist you in scheduling these services.  If you have already completed any of these items, please share that information with your care team to update in your medical record.  Health Maintenance Due   Topic Date Due    LUNG CANCER SCREENING  Never done    Pneumococcal Vaccine (2 - PCV) 09/10/2021    Annual Wellness Visit  04/24/2022    COVID-19 Vaccine (5 - Pfizer series) 08/26/2022    Diabetic Foot Exam  12/28/2022    ANNUAL REVIEW OF HM ORDERS  12/28/2022    A1C Lab  03/01/2023

## 2023-05-26 NOTE — PROGRESS NOTES
"SUBJECTIVE:   CC: Ashutosh is an 68 year old who presents for preventative health visit.       5/26/2023     1:35 PM   Additional Questions   Roomed by Laureen Carlton CMA     Patient has been advised of split billing requirements and indicates understanding: Yes     Going through a wellness program at work.  Glipizide was discontinued.  Previously was taking 5 mg twice daily.  Continues with metformin 1000 mg twice a day.  Has lost quite a bit of weight.  Blood pressure looks good.  New bradycardia today.  Asymptomatic.    Lab Results   Component Value Date    A1C 7.2 12/01/2022    A1C 7.1 09/08/2022    A1C 7.3 12/28/2021    A1C 7.0 07/01/2021    A1C 7.3 12/31/2020     Back pain  Right lower back pain.        Rash  Legs and arms. Flared in the winter.  No pain. No itch. Feels dry. No new soaps, lotions, detergents.  No travel to wooded areas.      Healthy Habits:     In general, how would you rate your overall health?  Good    Frequency of exercise:  1 day/week    Duration of exercise:  Less than 15 minutes    Do you usually eat at least 4 servings of fruit and vegetables a day, include whole grains    & fiber and avoid regularly eating high fat or \"junk\" foods?  No    Taking medications regularly:  Yes    Ability to successfully perform activities of daily living:  No assistance needed    Home Safety:  No safety concerns identified    Hearing Impairment:  Difficulty following a conversation in a noisy restaurant or crowded room, need to ask people to speak up or repeat themselves and difficulty understanding soft or whispered speech    In the past 6 months, have you been bothered by leaking of urine?  No    In general, how would you rate your overall mental or emotional health?  Excellent      PHQ-2 Total Score: 0    Additional concerns today:  Yes        Today's PHQ-2 Score:       5/26/2023     6:11 AM   PHQ-2 ( 1999 [employer])   Q1: Little interest or pleasure in doing things 0   Q2: Feeling down, depressed or hopeless 0 "   PHQ-2 Score 0   Q1: Little interest or pleasure in doing things Not at all   Q2: Feeling down, depressed or hopeless Not at all   PHQ-2 Score 0           Social History     Tobacco Use     Smoking status: Some Days     Types: Cigars, cigarillos or filtered cigars     Smokeless tobacco: Never     Tobacco comments:     cigars   Vaping Use     Vaping status: Not on file   Substance Use Topics     Alcohol use: Yes     Comment: Alcoholic Drinks/day: weekends             5/20/2023     1:57 PM   Alcohol Use   Prescreen: >3 drinks/day or >7 drinks/week? No       Last PSA:   Prostate Specific Antigen Screen   Date Value Ref Range Status   07/01/2021 1.1 0.0 - 4.5 ng/mL Final       Reviewed orders with patient. Reviewed health maintenance and updated orders accordingly - Yes  Lab work is in process    Reviewed and updated as needed this visit by clinical staff   Tobacco  Allergies  Meds              Reviewed and updated as needed this visit by Provider                 Past Medical History:   Diagnosis Date     Allergic rhinitis      Allergic rhinitis      Diabetes mellitus (H)      Diabetes mellitus (H)      Erectile dysfunction      Erectile dysfunction      Hyperlipemia      Hyperlipemia      Hypertension      Hypertension      Obesity (BMI 35.0-39.9 without comorbidity)      Obesity (BMI 35.0-39.9 without comorbidity)      ANA (obstructive sleep apnea)      ANA (obstructive sleep apnea)      Plantar fasciitis      Plantar fasciitis      Prepatellar bursitis      Prepatellar bursitis      Tinnitus      Tinnitus       Past Surgical History:   Procedure Laterality Date     ARTHROPLASTY HIP ANTERIOR Right 12/7/2022    Procedure: RIGHT DIRECT ANTERIOR TOTAL HIP ARTHROPLASTY;  Surgeon: Navin Londono MD;  Location: [facility] OR     ORTHOPEDIC SURGERY Left     hip       Review of Systems   Constitutional: Negative for chills and fever.   HENT: Positive for congestion and hearing loss. Negative for ear pain and sore  throat.    Eyes: Positive for visual disturbance. Negative for pain.   Respiratory: Negative for cough and shortness of breath.    Cardiovascular: Negative for chest pain, palpitations and peripheral edema.   Gastrointestinal: Positive for diarrhea. Negative for abdominal pain, constipation, heartburn, hematochezia and nausea.   Genitourinary: Negative for dysuria, frequency, genital sores, hematuria, impotence, penile discharge and urgency.   Musculoskeletal: Positive for arthralgias and myalgias. Negative for joint swelling.   Skin: Positive for rash.   Neurological: Negative for dizziness, weakness, headaches and paresthesias.   Psychiatric/Behavioral: Negative for mood changes. The patient is not nervous/anxious.        OBJECTIVE:   /70 (BP Location: Left arm, Patient Position: Sitting, Cuff Size: Adult Regular)   Pulse (!) 48   Temp 98.1  F (36.7  C) (Oral)   Resp 16   Ht 1.829 m (6')   Wt 101.8 kg (224 lb 6.4 oz)   SpO2 96%   BMI 30.43 kg/m      Physical Exam  GENERAL: healthy, alert and no distress  EYES: Eyes grossly normal to inspection, PERRL and conjunctivae and sclerae normal  HENT: ear canals and TM's normal, nose and mouth without ulcers or lesions  NECK: no adenopathy, no asymmetry, masses, or scars and thyroid normal to palpation  RESP: lungs clear to auscultation - no rales, rhonchi or wheezes  CV: No murmur.  Irregular rate and bradycardic.  No peripheral edema and peripheral pulses strong  ABDOMEN: soft, nontender, no hepatosplenomegaly, no masses and bowel sounds normal  MS: no gross musculoskeletal defects noted, no edema  SKIN: no suspicious lesions or rashes  NEURO: Normal strength and tone, mentation intact and speech normal  PSYCH: mentation appears normal, affect normal/bright    Diagnostic Test Results:  Labs reviewed in Epic    ASSESSMENT/PLAN:       ICD-10-CM    1. Routine general medical examination at a health care facility  Z00.00       2. Type 2 diabetes mellitus without  complication, without long-term current use of insulin (H)  E11.9 HEMOGLOBIN A1C     Lipid panel     Comprehensive metabolic panel (BMP + Alb, Alk Phos, ALT, AST, Total. Bili, TP)      3. Bradycardia  R00.1 EKG 12-lead, tracing only     EKG 12-lead, tracing only      4. Screening for prostate cancer  Z12.5 PSA, screen      5. Essential Hypercholesterolemia  E78.00 Lipid panel      6. Rash  R21 triamcinolone (KENALOG) 0.1 % external cream        Triamcinolone for the rash.  Let me know if failing to improve.  Update screening labs.  Consider cutting back simvastatin and metformin depending on labs.  COVID booster given.  Stop hydrochlorothiazide and if blood pressure numbers creep back up, let me know and restart.  Colon cancer screening up-to-date.  Asymptomatic bradycardia so we decided to get an ECG which looks fine.  We will continue to monitor.    COUNSELING:   Reviewed preventive health counseling, as reflected in patient instructions      BMI:   Estimated body mass index is 30.43 kg/m  as calculated from the following:    Height as of this encounter: 1.829 m (6').    Weight as of this encounter: 101.8 kg (224 lb 6.4 oz).   Weight management plan: Discussed healthy diet and exercise guidelines      He reports that he has been smoking cigars, cigarillos or filtered cigars. He has never used smokeless tobacco.      Raghavendra Zafar NP  [facility]

## 2023-09-24 ENCOUNTER — HEALTH MAINTENANCE LETTER (OUTPATIENT)
Age: 68
End: 2023-09-24

## 2023-12-22 ENCOUNTER — OFFICE VISIT (OUTPATIENT)
Dept: FAMILY MEDICINE | Facility: CLINIC | Age: 68
End: 2023-12-22
Payer: COMMERCIAL

## 2023-12-22 VITALS
OXYGEN SATURATION: 97 % | HEIGHT: 72 IN | HEART RATE: 73 BPM | WEIGHT: 230 LBS | BODY MASS INDEX: 31.15 KG/M2 | TEMPERATURE: 98.7 F | DIASTOLIC BLOOD PRESSURE: 88 MMHG | SYSTOLIC BLOOD PRESSURE: 138 MMHG | RESPIRATION RATE: 16 BRPM

## 2023-12-22 DIAGNOSIS — E11.9 TYPE 2 DIABETES MELLITUS WITHOUT COMPLICATION, WITHOUT LONG-TERM CURRENT USE OF INSULIN (H): Primary | ICD-10-CM

## 2023-12-22 DIAGNOSIS — E78.00 PURE HYPERCHOLESTEROLEMIA: ICD-10-CM

## 2023-12-22 DIAGNOSIS — I71.43 INFRARENAL ABDOMINAL AORTIC ANEURYSM (AAA) WITHOUT RUPTURE (H): ICD-10-CM

## 2023-12-22 DIAGNOSIS — I10 ESSENTIAL HYPERTENSION: ICD-10-CM

## 2023-12-22 DIAGNOSIS — R21 RASH: ICD-10-CM

## 2023-12-22 PROBLEM — Z13.6 SCREENING FOR AAA (ABDOMINAL AORTIC ANEURYSM): Status: RESOLVED | Noted: 2022-09-08 | Resolved: 2023-12-22

## 2023-12-22 LAB
ALBUMIN SERPL BCG-MCNC: 4.1 G/DL (ref 3.5–5.2)
ALP SERPL-CCNC: 71 U/L (ref 40–150)
ALT SERPL W P-5'-P-CCNC: 13 U/L (ref 0–70)
ANION GAP SERPL CALCULATED.3IONS-SCNC: 11 MMOL/L (ref 7–15)
AST SERPL W P-5'-P-CCNC: 18 U/L (ref 0–45)
BILIRUB SERPL-MCNC: 0.4 MG/DL
BUN SERPL-MCNC: 11.9 MG/DL (ref 8–23)
CALCIUM SERPL-MCNC: 8.5 MG/DL (ref 8.8–10.2)
CHLORIDE SERPL-SCNC: 106 MMOL/L (ref 98–107)
CREAT SERPL-MCNC: 0.68 MG/DL (ref 0.67–1.17)
CREAT UR-MCNC: 22.8 MG/DL
DEPRECATED HCO3 PLAS-SCNC: 27 MMOL/L (ref 22–29)
EGFRCR SERPLBLD CKD-EPI 2021: >90 ML/MIN/1.73M2
GLUCOSE SERPL-MCNC: 155 MG/DL (ref 70–99)
HBA1C MFR BLD: 7.3 % (ref 0–5.6)
MICROALBUMIN UR-MCNC: 29.5 MG/L
MICROALBUMIN/CREAT UR: 129.39 MG/G CR (ref 0–17)
POTASSIUM SERPL-SCNC: 3.8 MMOL/L (ref 3.4–5.3)
PROT SERPL-MCNC: 6.5 G/DL (ref 6.4–8.3)
SODIUM SERPL-SCNC: 144 MMOL/L (ref 135–145)

## 2023-12-22 PROCEDURE — 91320 SARSCV2 VAC 30MCG TRS-SUC IM: CPT | Performed by: NURSE PRACTITIONER

## 2023-12-22 PROCEDURE — 36415 COLL VENOUS BLD VENIPUNCTURE: CPT | Performed by: NURSE PRACTITIONER

## 2023-12-22 PROCEDURE — 90480 ADMN SARSCOV2 VAC 1/ONLY CMP: CPT | Performed by: NURSE PRACTITIONER

## 2023-12-22 PROCEDURE — 90677 PCV20 VACCINE IM: CPT | Performed by: NURSE PRACTITIONER

## 2023-12-22 PROCEDURE — 80053 COMPREHEN METABOLIC PANEL: CPT | Performed by: NURSE PRACTITIONER

## 2023-12-22 PROCEDURE — 82570 ASSAY OF URINE CREATININE: CPT | Performed by: NURSE PRACTITIONER

## 2023-12-22 PROCEDURE — 82043 UR ALBUMIN QUANTITATIVE: CPT | Performed by: NURSE PRACTITIONER

## 2023-12-22 PROCEDURE — 90471 IMMUNIZATION ADMIN: CPT | Performed by: NURSE PRACTITIONER

## 2023-12-22 PROCEDURE — 83036 HEMOGLOBIN GLYCOSYLATED A1C: CPT | Performed by: NURSE PRACTITIONER

## 2023-12-22 PROCEDURE — 99214 OFFICE O/P EST MOD 30 MIN: CPT | Mod: 25 | Performed by: NURSE PRACTITIONER

## 2023-12-22 RX ORDER — AMLODIPINE BESYLATE 10 MG/1
10 TABLET ORAL DAILY
Qty: 90 TABLET | Refills: 3 | Status: SHIPPED | OUTPATIENT
Start: 2023-12-22

## 2023-12-22 RX ORDER — LOSARTAN POTASSIUM 100 MG/1
100 TABLET ORAL DAILY
Qty: 90 TABLET | Refills: 3 | Status: SHIPPED | OUTPATIENT
Start: 2023-12-22

## 2023-12-22 RX ORDER — SIMVASTATIN 80 MG
80 TABLET ORAL EVERY EVENING
Qty: 90 TABLET | Refills: 3 | Status: SHIPPED | OUTPATIENT
Start: 2023-12-22

## 2023-12-22 RX ORDER — CLOTRIMAZOLE AND BETAMETHASONE DIPROPIONATE 10; .64 MG/G; MG/G
CREAM TOPICAL 2 TIMES DAILY
Qty: 45 G | Refills: 1 | Status: SHIPPED | OUTPATIENT
Start: 2023-12-22 | End: 2024-04-16

## 2023-12-22 NOTE — PROGRESS NOTES
Assessment & Plan     ICD-10-CM    1. Type 2 diabetes mellitus without complication, without long-term current use of insulin (H)  E11.9 HEMOGLOBIN A1C     Albumin Random Urine Quantitative with Creat Ratio     Comprehensive metabolic panel (BMP + Alb, Alk Phos, ALT, AST, Total. Bili, TP)     metFORMIN (GLUCOPHAGE) 1000 MG tablet     HEMOGLOBIN A1C     Albumin Random Urine Quantitative with Creat Ratio     Comprehensive metabolic panel (BMP + Alb, Alk Phos, ALT, AST, Total. Bili, TP)      2. Essential hypertension  I10 losartan (COZAAR) 100 MG tablet     amLODIPine (NORVASC) 10 MG tablet      3. Essential Hypercholesterolemia  E78.00 simvastatin (ZOCOR) 80 MG tablet      4. Infrarenal abdominal aortic aneurysm (AAA) without rupture (H24)  I71.43 US Abdominal Aorta Imaging      5. Rash  R21 clotrimazole-betamethasone (LOTRISONE) 1-0.05 % external cream     Adult Dermatology  Referral        COVID and Prevnar 20 given today.  Try Lotrisone for rash.  If no improvement, dermatology.  Update annual AAA screening.  Continue same antihypertensives.  Update diabetic labs.    Subjective     HPI     Diabetes:   He presents for follow up of diabetes.  He is checking home blood glucose one time daily.   He checks blood glucose before meals.  Blood glucose is sometimes over 200 and never under 70. He is aware of hypoglycemia symptoms including blurred vision.    He has no concerns regarding his diabetes at this time.   He is not experiencing numbness or burning in feet, excessive thirst, blurry vision, weight changes or redness, sores or blisters on feet.       Glipizide stopped earlier this spring.     Lab Results   Component Value Date    A1C 6.9 05/26/2023    A1C 7.2 12/01/2022    A1C 7.1 09/08/2022    A1C 7.3 12/28/2021    A1C 7.0 07/01/2021     Hypertension: He presents for follow up of hypertension.  He does check blood pressure  regularly outside of the clinic. Outpatient blood pressures have not been over  140/90. He does not follow a low salt diet. He consumes 0 sweetened beverage(s) daily.He exercises with enough effort to increase his heart rate 9 or less minutes per day.  He exercises with enough effort to increase his heart rate 3 or less days per week.   He is taking medications regularly.    He did try stopping the amlodipine but his pressures started to rise up and so he did end up restarting.      Review of Systems - negative except for what's listed in the HPI      Objective    /88 (BP Location: Right arm, Patient Position: Sitting, Cuff Size: Adult Regular)   Pulse 73   Temp 98.7  F (37.1  C) (Oral)   Resp 16   Ht 1.829 m (6')   Wt 104.3 kg (230 lb)   SpO2 97%   BMI 31.19 kg/m    Physical Exam   General appearance - alert, well appearing, and in no distress  Mental status - alert, oriented to person, place, and time  Eyes -PERRLA  Lymphatics - no palpable lymphadenopathy  Chest - clear to auscultation, no wheezes, rales or rhonchi, symmetric air entry  Heart - normal rate and regular rhythm, S1 and S2 normal, no murmurs noted  Abdomen - soft, nontender, nondistended, no masses or organomegaly  Neurological - alert, oriented, normal speech, no focal findings or movement disorder noted, neck supple without rigidity, cranial nerves II through XII intact, motor and sensory grossly normal bilaterally, normal muscle tone, no tremors, strength 5/5  Extremities - peripheral pulses normal, no peripheral edema  Skin -on the legs there are splotches of redness.  Not hot to touch.  Peeling skin.  No blisters.    Raghavendra Zafar, CNP    This note has been dictated using voice recognition software. Any grammatical or context distortions are unintentional and inherent to the software.

## 2023-12-22 NOTE — PATIENT INSTRUCTIONS
Updating lab work.    It's time for your early ultrasound on that abdominal aortic aneurysm.  Scheduling number is highlighted in your paperwork.    Updating lab work today.    Fall/winter COVID shot and your last pneumonia shot given today.    I sent a new cream called Lotrisone to the pharmacy.  If this does not help the rash on the legs then we should connect with dermatology.  Contact information is highlighted in your paperwork.

## 2024-01-08 ENCOUNTER — HOSPITAL ENCOUNTER (OUTPATIENT)
Dept: ULTRASOUND IMAGING | Facility: CLINIC | Age: 69
Discharge: HOME | End: 2024-01-08
Attending: NURSE PRACTITIONER | Admitting: NURSE PRACTITIONER
Payer: COMMERCIAL

## 2024-01-08 DIAGNOSIS — I71.43 INFRARENAL ABDOMINAL AORTIC ANEURYSM (AAA) WITHOUT RUPTURE (H): ICD-10-CM

## 2024-01-08 PROCEDURE — 76775 US EXAM ABDO BACK WALL LIM: CPT

## 2024-02-07 ENCOUNTER — PATIENT OUTREACH (OUTPATIENT)
Dept: GASTROENTEROLOGY | Facility: CLINIC | Age: 69
End: 2024-02-07
Payer: COMMERCIAL

## 2024-03-04 ENCOUNTER — TRANSFERRED RECORDS (OUTPATIENT)
Dept: MULTI SPECIALTY CLINIC | Facility: CLINIC | Age: 69
End: 2024-03-04

## 2024-03-04 LAB — RETINOPATHY: NORMAL

## 2024-03-27 ENCOUNTER — TRANSFERRED RECORDS (OUTPATIENT)
Dept: HEALTH INFORMATION MANAGEMENT | Facility: CLINIC | Age: 69
End: 2024-03-27
Payer: COMMERCIAL

## 2024-04-16 ENCOUNTER — OFFICE VISIT (OUTPATIENT)
Dept: FAMILY MEDICINE | Facility: CLINIC | Age: 69
End: 2024-04-16
Payer: COMMERCIAL

## 2024-04-16 VITALS
HEART RATE: 62 BPM | WEIGHT: 226.8 LBS | SYSTOLIC BLOOD PRESSURE: 124 MMHG | DIASTOLIC BLOOD PRESSURE: 82 MMHG | RESPIRATION RATE: 16 BRPM | BODY MASS INDEX: 30.72 KG/M2 | TEMPERATURE: 98.2 F | OXYGEN SATURATION: 96 % | HEIGHT: 72 IN

## 2024-04-16 DIAGNOSIS — N50.89 SWELLING OF LEFT TESTICLE: ICD-10-CM

## 2024-04-16 DIAGNOSIS — H26.9 CATARACT OF BOTH EYES, UNSPECIFIED CATARACT TYPE: ICD-10-CM

## 2024-04-16 DIAGNOSIS — I71.43 INFRARENAL ABDOMINAL AORTIC ANEURYSM (AAA) WITHOUT RUPTURE (H): ICD-10-CM

## 2024-04-16 DIAGNOSIS — S39.012A STRAIN OF LUMBAR REGION, INITIAL ENCOUNTER: ICD-10-CM

## 2024-04-16 DIAGNOSIS — Z01.818 PREOP GENERAL PHYSICAL EXAM: Primary | ICD-10-CM

## 2024-04-16 DIAGNOSIS — E11.9 TYPE 2 DIABETES MELLITUS WITHOUT COMPLICATION, WITHOUT LONG-TERM CURRENT USE OF INSULIN (H): ICD-10-CM

## 2024-04-16 DIAGNOSIS — R21 RASH: ICD-10-CM

## 2024-04-16 LAB — HBA1C MFR BLD: 7.2 % (ref 0–5.6)

## 2024-04-16 PROCEDURE — 80053 COMPREHEN METABOLIC PANEL: CPT | Performed by: NURSE PRACTITIONER

## 2024-04-16 PROCEDURE — 83036 HEMOGLOBIN GLYCOSYLATED A1C: CPT | Performed by: NURSE PRACTITIONER

## 2024-04-16 PROCEDURE — 36415 COLL VENOUS BLD VENIPUNCTURE: CPT | Performed by: NURSE PRACTITIONER

## 2024-04-16 PROCEDURE — 99215 OFFICE O/P EST HI 40 MIN: CPT | Performed by: NURSE PRACTITIONER

## 2024-04-16 ASSESSMENT — PAIN SCALES - GENERAL: PAINLEVEL: NO PAIN (0)

## 2024-04-16 NOTE — PROGRESS NOTES
Preoperative Evaluation  [facility]  [address]  [place]  [facility] [address]  Phone: [phone]  Fax: [phone]  Primary Provider: Raghavendra Zafar  Pre-op Performing Provider: RAGHAVENDRA ZAFAR  Apr 16, 2024       Ashutosh is a 68 year old, presenting for the following:  Pre-Op Exam        4/16/2024     8:14 AM   Additional Questions   Roomed by Laureen Lackey CMA     Surgical Information  Surgery/Procedure: Cataract  Surgery Location: [facility]  Surgeon: Dr. Franky Mcbride  Surgery Date: 5/7/24 & 5/28/24  Time of Surgery: TBD  Where patient plans to recover: At home with family  Fax number for surgical facility: [phone]    Assessment & Plan     The proposed surgical procedure is considered LOW risk.    Preop general physical exam  Medically appropriate for surgery.     Cataract of both eyes, unspecified cataract type  Vision progressively worsening. Following with ophthalmology. Noted to have bilateral cataracts and is scheduled for cataract surgery this May 2024.     Type 2 diabetes mellitus without complication, without long-term current use of insulin (H)  Remains stable on metformin. Last Hgb A1c 7.3 taken 12/2023. Will recheck labs.   - HEMOGLOBIN A1C; Future  - Comprehensive metabolic panel (BMP + Alb, Alk Phos, ALT, AST, Total. Bili, TP); Future  - HEMOGLOBIN A1C  - Comprehensive metabolic panel (BMP + Alb, Alk Phos, ALT, AST, Total. Bili, TP)    Strain of lumbar region, initial encounter  Low back pain for the past 2-3 weeks. Feels sore and achy. He reports no trauma, fevers/chills, urinary problems, bowel or bladder concerns.  Try PT.  If failing to improve let me know and we will get an MRI and consult with spine care.  - Physical Therapy  Referral; Future    Infrarenal abdominal aortic aneurysm (AAA) without rupture (H24)  Stable. Last abdominal US done 1/8/24 results showing aneurysmal dilation of the infrarenal  abdominal aorta measuring up to 4.1 cm. Will continue to monitor yearly.     Rash  Seen by Dermatology 3/27/2024. Reports improvement in upper and lower bilateral extremities rash. Red diffusely scattered rash remains visible.  Itching resolved.    Swelling of left testicle  Noticed change in left testicle, feeling more full and heavier. Having some occasional pain in left testicle. Will order ultrasound to review.   - US Testicular & Scrotum w Doppler Ltd; Future    ANA  Uses CPAP       - No identified additional risk factors other than previously addressed    Antiplatelet or Anticoagulation Medication Instructions   - Bleeding risk is low for this procedure (e.g. dental, skin, cataract).    Additional Medication Instructions  Patient is to take Metoprolol and Norvasc on morning of surgery. He will hold all other scheduled medications including metformin.     Recommendation  APPROVAL GIVEN to proceed with proposed procedure, without further diagnostic evaluation.    Review of prior external note(s) from - Outside records from Dermatology      Total time spent was 45 minutes including reviewing records prior to arrival, consultation, placing orders, education, and reviewing the plan of care on the date of service.      Subjective       Via the Health Maintenance questionnaire, the patient has reported the following services have been completed -Eye Exam, this information has been sent to the abstraction team.    HPI related to upcoming procedure:   Patient present to clinic for pre-op evaluation. He is scheduled for planned cataract surgery with [facility] this May. He anticipates surgery on both eyes. He feels ready for the procedure. He reports having lower back pain that does not radiate. He denies trauma or urinary concerns. Reports his back pain may have started after lifting. Did do some golfing. He also reports testicular irregularity. He is having occasional pain, fullness and heaviness in his  left testicle.                 4/11/2024     8:17 AM   Preop Questions   1. Have you ever had a heart attack or stroke? No   2. Have you ever had surgery on your heart or blood vessels, such as a stent placement, a coronary artery bypass, or surgery on an artery in your head, neck, heart, or legs? No   3. Do you have chest pain with activity? No   4. Do you have a history of  heart failure? No   5. Do you currently have a cold, bronchitis or symptoms of other infection? No   6. Do you have a cough, shortness of breath, or wheezing? No   7. Do you or anyone in your family have previous history of blood clots? No   8. Do you or does anyone in your family have a serious bleeding problem such as prolonged bleeding following surgeries or cuts? No   9. Have you ever had problems with anemia or been told to take iron pills? No   10. Have you had any abnormal blood loss such as black, tarry or bloody stools? No   11. Have you ever had a blood transfusion? No   12. Are you willing to have a blood transfusion if it is medically needed before, during, or after your surgery? Yes   13. Have you or any of your relatives ever had problems with anesthesia? No   14. Do you have sleep apnea, excessive snoring or daytime drowsiness? YES - sleep apnea   14a. Do you have a CPAP machine? Yes   15. Do you have any artifical heart valves or other implanted medical devices like a pacemaker, defibrillator, or continuous glucose monitor? No   16. Do you have artificial joints? YES -bilateral total hip arthroplasty   17. Are you allergic to latex? No       Preoperative Review of    reviewed - no record of controlled substances prescribed.      Status of Chronic Conditions:    Patient Active Problem List    Diagnosis Date Noted    Infrarenal abdominal aortic aneurysm (AAA) without rupture (H24) 12/22/2023     Priority: Medium    History of smoking 09/08/2022     Priority: Medium    Familial combined hyperlipidemia 01/26/2018     Priority:  Medium    Tinnitus 12/28/2016     Priority: Medium    Essential Hypertension      Priority: Medium     Created by Conversion  Replacement Utility updated for latest IMO load        Allergic Rhinitis      Priority: Medium     Created by Conversion  Replacement Utility updated for latest IMO load        Diabetes mellitus (H) 05/29/2015     Priority: Medium    Essential Hypercholesterolemia      Priority: Medium     Created by Conversion        Obesity      Priority: Medium     Created by Conversion        Obstructive Sleep Apnea      Priority: Medium     Created by Conversion        Benign Adenomatous Polyp Of The Large Intestine      Priority: Medium     Created by Conversion          Past Medical History:   Diagnosis Date    Allergic rhinitis     Allergic rhinitis     Diabetes mellitus (H)     Diabetes mellitus (H)     Erectile dysfunction     Erectile dysfunction     Hyperlipemia     Hyperlipemia     Hypertension     Hypertension     Obesity (BMI 35.0-39.9 without comorbidity)     Obesity (BMI 35.0-39.9 without comorbidity)     ANA (obstructive sleep apnea)     ANA (obstructive sleep apnea)     Plantar fasciitis     Plantar fasciitis     Prepatellar bursitis     Prepatellar bursitis     Tinnitus     Tinnitus      Past Surgical History:   Procedure Laterality Date    ARTHROPLASTY HIP ANTERIOR Right 12/7/2022    Procedure: RIGHT DIRECT ANTERIOR TOTAL HIP ARTHROPLASTY;  Surgeon: Navin Londono MD;  Location: [facility] OR    ORTHOPEDIC SURGERY Left     hip     Current Outpatient Medications   Medication Sig Dispense Refill    amLODIPine (NORVASC) 10 MG tablet Take 1 tablet (10 mg) by mouth daily 90 tablet 3    losartan (COZAAR) 100 MG tablet Take 1 tablet (100 mg) by mouth daily 90 tablet 3    metFORMIN (GLUCOPHAGE) 1000 MG tablet Take 1 tablet (1,000 mg) by mouth 2 times daily (with meals) 180 tablet 1    metoprolol succinate ER (TOPROL XL) 25 MG 24 hr tablet Take 1 tablet by mouth once daily 90 tablet 1     "simvastatin (ZOCOR) 80 MG tablet Take 1 tablet (80 mg) by mouth every evening 90 tablet 3    clotrimazole-betamethasone (LOTRISONE) 1-0.05 % external cream Apply topically 2 times daily 45 g 1       Allergies   Allergen Reactions    Lisinopril Cough        Social History     Tobacco Use    Smoking status: Some Days     Types: Cigars, cigarillos or filtered cigars     Passive exposure: Current    Smokeless tobacco: Never    Tobacco comments:     cigars   Substance Use Topics    Alcohol use: Yes     Comment: Alcoholic Drinks/day: weekends     Family History   Problem Relation Age of Onset    Melanoma Mother     Other Cancer Mother         none    No Known Problems Brother     Coronary Artery Disease Father         none    Hypertension Father         none     History   Drug Use Unknown         Review of Systems    Review of Systems  CONSTITUTIONAL: NEGATIVE for fever, chills, change in weight  ENT/MOUTH: NEGATIVE for ear, mouth and throat problems  RESP: NEGATIVE for significant cough or SOB  CV: NEGATIVE for chest pain, palpitations or peripheral edema  GI: NEGATIVE for nausea, abdominal pain, heartburn, or change in bowel habits  : negative for dysuria, hematuria, decreased urinary stream, erectile dysfunction  MUSCULOSKELETAL: NEGATIVE for significant arthralgias or myalgia    Objective    /82 (BP Location: Left arm, Patient Position: Sitting, Cuff Size: Adult Regular)   Pulse 62   Temp 98.2  F (36.8  C) (Oral)   Resp 16   Ht 1.835 m (6' 0.25\")   Wt 102.9 kg (226 lb 12.8 oz)   SpO2 96%   BMI 30.55 kg/m     Estimated body mass index is 30.55 kg/m  as calculated from the following:    Height as of this encounter: 1.835 m (6' 0.25\").    Weight as of this encounter: 102.9 kg (226 lb 12.8 oz).  Physical Exam  GENERAL: alert and no distress  EYES: Eyes grossly normal to inspection, PERRL and conjunctivae and sclerae normal  HENT: ear canals and TM's normal, nose and mouth without ulcers or lesions  NECK: no " adenopathy, no asymmetry, masses, or scars  RESP: lungs clear to auscultation - no rales, rhonchi or wheezes  CV: regular rate and rhythm, normal S1 S2, no S3 or S4, no murmur, click or rub, no peripheral edema  ABDOMEN: soft, nontender, no hepatosplenomegaly, no masses and bowel sounds normal  MS: low back pain, no gross musculoskeletal defects noted, no edema  SKIN: scattered flat red rash on arms and legs, no suspicious lesions   NEURO: Normal strength and tone, mentation intact and speech normal  PSYCH: mentation appears normal, affect normal/bright    Recent Labs   Lab Test 12/22/23  0812 05/26/23  1434 12/01/22  1225   HGB  --   --  14.3   PLT  --   --  237    142 145   POTASSIUM 3.8 3.9 3.7   CR 0.68 0.83 0.78   A1C 7.3* 6.9* 7.2*        Diagnostics  Labs pending at this time.  Results will be reviewed when available.   No EKG required for low risk surgery (cataract, skin procedure, breast biopsy, etc).    Revised Cardiac Risk Index (RCRI)  The patient has the following serious cardiovascular risks for perioperative complications:   - No serious cardiac risks = 0 points     RCRI Interpretation: 0 points: Class I (very low risk - 0.4% complication rate)     Raymon Rodriguez RN BSN        Student Nurse Practitioner - [facility]      Agree with above documentation from nurse practitioner student.  Physical assessment also performed by me.  Addendum's made by me are in bold.    Signed Electronically by: Raghavendra Zafar NP  Copy of this evaluation report is provided to requesting physician.

## 2024-04-16 NOTE — PATIENT INSTRUCTIONS
I will get your paperwork faxed to your surgeon.    Follow your surgeon's directions regarding eating and drinking prior to surgery.     Medications you can take the morning of surgery include any inhalers and: metoprolol and amlodipine    Stop all supplements a week before surgery.    Your surgeon will manage any complications after your procedure.    I put the number for PT in your paperwork.  If this is needed and doesn't help or symptoms worsen, let me know.     Ultrasound ordered.

## 2024-04-17 LAB
ALBUMIN SERPL BCG-MCNC: 4.2 G/DL (ref 3.5–5.2)
ALP SERPL-CCNC: 80 U/L (ref 40–150)
ALT SERPL W P-5'-P-CCNC: 16 U/L (ref 0–70)
ANION GAP SERPL CALCULATED.3IONS-SCNC: 13 MMOL/L (ref 7–15)
AST SERPL W P-5'-P-CCNC: 25 U/L (ref 0–45)
BILIRUB SERPL-MCNC: 0.4 MG/DL
BUN SERPL-MCNC: 10.8 MG/DL (ref 8–23)
CALCIUM SERPL-MCNC: 9 MG/DL (ref 8.8–10.2)
CHLORIDE SERPL-SCNC: 103 MMOL/L (ref 98–107)
CREAT SERPL-MCNC: 0.66 MG/DL (ref 0.67–1.17)
DEPRECATED HCO3 PLAS-SCNC: 26 MMOL/L (ref 22–29)
EGFRCR SERPLBLD CKD-EPI 2021: >90 ML/MIN/1.73M2
GLUCOSE SERPL-MCNC: 161 MG/DL (ref 70–99)
POTASSIUM SERPL-SCNC: 4 MMOL/L (ref 3.4–5.3)
PROT SERPL-MCNC: 6.9 G/DL (ref 6.4–8.3)
SODIUM SERPL-SCNC: 142 MMOL/L (ref 135–145)

## 2024-04-25 ENCOUNTER — HOSPITAL ENCOUNTER (OUTPATIENT)
Dept: ULTRASOUND IMAGING | Facility: CLINIC | Age: 69
Discharge: HOME | End: 2024-04-25
Attending: NURSE PRACTITIONER | Admitting: NURSE PRACTITIONER
Payer: COMMERCIAL

## 2024-04-25 DIAGNOSIS — N50.89 SWELLING OF LEFT TESTICLE: ICD-10-CM

## 2024-04-25 PROCEDURE — 76870 US EXAM SCROTUM: CPT

## 2024-04-26 ENCOUNTER — TELEPHONE (OUTPATIENT)
Dept: FAMILY MEDICINE | Facility: CLINIC | Age: 69
End: 2024-04-26
Payer: COMMERCIAL

## 2024-04-26 DIAGNOSIS — L72.9 SCROTAL CYST: Primary | ICD-10-CM

## 2024-04-26 NOTE — TELEPHONE ENCOUNTER
----- Message from Raghavendra Zafar NP sent at 4/26/2024 12:40 PM CDT -----  Please call patient.  Ultrasound does show a somewhat large cyst on the left side which is likely causing his heaviness and discomfort.  With a cyst  this large, we could connect him with urology to discuss surgical removal.  Is he interested in talking with them?    Raghavendra Zafar, CNP

## 2024-04-26 NOTE — TELEPHONE ENCOUNTER
Left message to call back for: Ashutosh  Information to relay to patient: please relay the message below.

## 2024-04-26 NOTE — TELEPHONE ENCOUNTER
Patient Returning Call    Reason for call:  Patient returned call     Information relayed to patient:  Writer relayed provider's message below     Patient has additional questions:  Yes    What are your questions/concerns:  Patient states he would like to see Urology. Please advise and call patient back with updates please and thank you.    Who does the patient want to speak with:  Provider    Is an  needed?:  No      Could we send this information to you in [facility] or would you prefer to receive a phone call?:   Patient would prefer a phone call   Okay to leave a detailed message?: Yes at Cell number on file:    Telephone Information:   Mobile [phone]

## 2024-04-29 NOTE — TELEPHONE ENCOUNTER
Done. Referral placed. Please make sure he has their contact info. Let me know if there's any hiccups.    Raghavendra Zafar, CNP

## 2024-05-03 ENCOUNTER — TELEPHONE (OUTPATIENT)
Dept: UROLOGY | Facility: CLINIC | Age: 69
End: 2024-05-03
Payer: COMMERCIAL

## 2024-05-03 NOTE — TELEPHONE ENCOUNTER
[facility] Call Center    Phone Message    May a detailed message be left on voicemail: yes     Reason for Call: Appointment Intake    Referring Provider Name:     Raghavendra Zafar NP in [facility]     Diagnosis and/or Symptoms: Scrotal Cyst  Please review referral as writer is unable to schedule for this dx. Sending to clinical team for review per protocols.    Action Taken: Other: UROLOGY    Travel Screening: Not Applicable

## 2024-05-07 NOTE — TELEPHONE ENCOUNTER
MEDICAL RECORDS REQUEST   [facility]  Urology Clinic  [address] [place] [address]  PHONE: [phone]  Fax: [phone]        FUTURE VISIT INFORMATION                                                   Ashutosh Vieira, : 1955 scheduled for future visit at [facility]    APPOINTMENT INFORMATION:  Date: 2024  Provider:  Grody Otoole PA-C  Reason for Visit/Diagnosis: Scrotal Cyst    REFERRAL INFORMATION:  Referring provider:  Raghavendra Zafar NP in [facility]      RECORDS REQUESTED FOR VISIT                                                     NOTES  STATUS/DETAILS   OFFICE NOTE from referring provider  yes, 2024 -- Raghavendra Zafar NP in [facility]   MEDICATION LIST  yes   IMAGES  yes, 2024 -- US TESTICULAR AND SCROTUM     PRE-VISIT CHECKLIST      Joint diagnostic appointment coordinated correctly          (ensure right order & amount of time) Yes   RECORD COLLECTION COMPLETE Yes

## 2024-05-15 NOTE — PROGRESS NOTES
Visit conducted via real-time audio/video technology by Gordy Otoole PA-C to the patient in their home. Patient consented to this billed visit that was started at 8:00 AM and completed at 8:16 AM.    Subjective      REFERRING PROVIDER  SAURABH Ward, CNP    REASON FOR VISIT  Epididymal cysts    HISTORY OF PRESENT ILLNESS  Mr. Vieira is a 69 year old male who I am speaking with today in regards to his recently discovered renal cysts.  His past medical history is significant for type 2 diabetes, infrarenal abdominal aortic aneurysm without rupture, obstructive sleep apnea using CPAP machine, his more recent.  I personally reviewed the most recent family practice visit note from 4/16/2024 in preparation for today's visit.    It appears that while undergoing a preoperative exam for cataract surgery coming up soon, Dalton noticed some swelling in the left feeling full and had with occasional pain.  Ultrasound was completed which showed multiple left epididymal cysts, the largest measuring at 2.8 cm.  Was referred to urology for discussion.    Today:  First noticed the testicular symptoms in Feb/March of 2024  Discomfort is mostly episodic with minor trauma associated with sitting (3/10 when happening, 0/10 at baseline)  No history of scrotal surgeries   No history of gross hematuria or epididymitis     SOCIAL HISTORY  Denies any history of or current smoking     FAMILY HISTORY   Denies any known family history of urologic malignancy     REVIEW OF SYSTEMS   Review of Systems   Constitutional:  Negative for activity change and unexpected weight change.   HENT:  Positive for tinnitus (Chronic). Negative for ear pain.    Eyes:  Positive for visual disturbance (associated with cataracts and t2dm).   Respiratory:  Negative for shortness of breath.    Cardiovascular:  Negative for chest pain.   Gastrointestinal:  Negative for abdominal pain.   Genitourinary:  Negative for hematuria.   Musculoskeletal:  Positive for back  pain (Chronic low back pain).   Neurological:  Negative for dizziness and light-headedness.   Hematological:  Negative for adenopathy.   Psychiatric/Behavioral:  Negative for sleep disturbance.       Objective      PHYSICAL EXAMINATION  Deferred given virtual visit.    IMAGING  I personally reviewed and interpreted the below scrotal ultrasound from 4/25/24 and agree there are multiple cysts in the left epididymis with no other worrisome findings    Narrative & Impression   EXAM: US TESTICULAR AND SCROTUM WITH DOPPLER LIMITED  LOCATION: [facility]  DATE: 4/25/2024     INDICATION: left testicular swelling  COMPARISON: None.  TECHNIQUE: Ultrasound of scrotum with color flow and spectral Doppler with waveform analysis performed.     FINDINGS:     RIGHT: Right testicle measures 4.2 x 2.8 x 2.1 cm. Normal testicle with no masses. Normal arterial duplex and normal color flow. Normal epididymis. No hydrocele. No varicocele.     LEFT: Left testicle measures 3.5 x 2.3 x 2.6 cm. Normal testicle with no masses. Normal arterial duplex and normal color flow. There are several epididymal cysts in the head of the epididymis largest measures 2.8 x 1.7 cm. Tiny hydrocele No varicocele.                                                                      IMPRESSION:  1.  Normal testicles.     2.  Multiple epididymal cysts on the left side largest 2.8 cm.     3.  Tiny left hydrocele     Assessment & Plan    Left epididymal cyst    It is my pleasure to speak with Ashutosh in regards to his recently discovered multiple left epididymal cysts.  After reviewing his clinical history, we discussed that these epididymal cysts are not anything scary in regards to cancer or danger, but rather these are benign findings that are incidental and can grow and shrink with no intervention whatsoever.  Generally speaking given their benign nature, will only intervene if there is significant pain or bother from the cysts.  We spent  some time reviewing what a total or partial epididymectomy would entail and the risks that would go along with this, specifically the risk of possible loss of the testicle and risk of chronic scrotal content pain.  Ultimately I believe he has 2 options: Observation of the epididymal cysts with occasional NSAID use and tighter fitting underwear versus proceeding with first available surgery.    As of now, Ashutosh is most comfortable observing his epididymal cysts, and will follow-up with urology as needed. Mr. Vieira expressed understanding and agreement to the above discussion and plan and all of his questions were answered to his satisfaction.     PLAN  Observation of epididymal cysts   Follow-up with urology as needed     SIGNED    Gordy Otoole PA-C      I spent a total of 20 minutes spent on the date of the encounter doing chart review, history and exam, documentation, and further activities as noted above.

## 2024-05-16 ENCOUNTER — PRE VISIT (OUTPATIENT)
Dept: UROLOGY | Facility: CLINIC | Age: 69
End: 2024-05-16

## 2024-05-16 ENCOUNTER — VIRTUAL VISIT (OUTPATIENT)
Dept: UROLOGY | Facility: CLINIC | Age: 69
End: 2024-05-16
Payer: COMMERCIAL

## 2024-05-16 DIAGNOSIS — L72.9 SCROTAL CYST: ICD-10-CM

## 2024-05-16 PROCEDURE — 99203 OFFICE O/P NEW LOW 30 MIN: CPT | Mod: 95 | Performed by: STUDENT IN AN ORGANIZED HEALTH CARE EDUCATION/TRAINING PROGRAM

## 2024-05-16 ASSESSMENT — ENCOUNTER SYMPTOMS
HEMATURIA: 0
LIGHT-HEADEDNESS: 0
SHORTNESS OF BREATH: 0
SLEEP DISTURBANCE: 0
UNEXPECTED WEIGHT CHANGE: 0
ADENOPATHY: 0
ACTIVITY CHANGE: 0
BACK PAIN: 1
DIZZINESS: 0
ABDOMINAL PAIN: 0

## 2024-05-16 ASSESSMENT — PAIN SCALES - GENERAL: PAINLEVEL: NO PAIN (0)

## 2024-05-16 NOTE — LETTER
5/16/2024       RE: Ashutosh Vieira  [address] [facility] [address]     Dear Colleague,    Thank you for referring your patient, Ashutosh Vieira, to the [facility] [place] [facility]. Please see a copy of my visit note below.    Visit conducted via real-time audio/video technology by Gordy Otoole PA-C to the patient in their home. Patient consented to this billed visit that was started at 8:00 AM and completed at 8:16 AM.    Subjective     REFERRING PROVIDER  SAURABH Ward, CNP    REASON FOR VISIT  Epididymal cysts    HISTORY OF PRESENT ILLNESS  Mr. Vieira is a 69 year old male who I am speaking with today in regards to his recently discovered renal cysts.  His past medical history is significant for type 2 diabetes, infrarenal abdominal aortic aneurysm without rupture, obstructive sleep apnea using CPAP machine, his more recent.  I personally reviewed the most recent family practice visit note from 4/16/2024 in preparation for today's visit.    It appears that while undergoing a preoperative exam for cataract surgery coming up soon, Dalton noticed some swelling in the left feeling full and had with occasional pain.  Ultrasound was completed which showed multiple left epididymal cysts, the largest measuring at 2.8 cm.  Was referred to urology for discussion.    Today:  First noticed the testicular symptoms in Feb/March of 2024  Discomfort is mostly episodic with minor trauma associated with sitting (3/10 when happening, 0/10 at baseline)  No history of scrotal surgeries   No history of gross hematuria or epididymitis     SOCIAL HISTORY  Denies any history of or current smoking     FAMILY HISTORY   Denies any known family history of urologic malignancy     REVIEW OF SYSTEMS   Review of Systems   Constitutional:  Negative for activity change and unexpected weight change.   HENT:  Positive for tinnitus (Chronic). Negative for ear  pain.    Eyes:  Positive for visual disturbance (associated with cataracts and t2dm).   Respiratory:  Negative for shortness of breath.    Cardiovascular:  Negative for chest pain.   Gastrointestinal:  Negative for abdominal pain.   Genitourinary:  Negative for hematuria.   Musculoskeletal:  Positive for back pain (Chronic low back pain).   Neurological:  Negative for dizziness and light-headedness.   Hematological:  Negative for adenopathy.   Psychiatric/Behavioral:  Negative for sleep disturbance.       Objective     PHYSICAL EXAMINATION  Deferred given virtual visit.    IMAGING  I personally reviewed and interpreted the below scrotal ultrasound from 4/25/24 and agree there are multiple cysts in the left epididymis with no other worrisome findings    Narrative & Impression   EXAM: US TESTICULAR AND SCROTUM WITH DOPPLER LIMITED  LOCATION: [facility]  DATE: 4/25/2024     INDICATION: left testicular swelling  COMPARISON: None.  TECHNIQUE: Ultrasound of scrotum with color flow and spectral Doppler with waveform analysis performed.     FINDINGS:     RIGHT: Right testicle measures 4.2 x 2.8 x 2.1 cm. Normal testicle with no masses. Normal arterial duplex and normal color flow. Normal epididymis. No hydrocele. No varicocele.     LEFT: Left testicle measures 3.5 x 2.3 x 2.6 cm. Normal testicle with no masses. Normal arterial duplex and normal color flow. There are several epididymal cysts in the head of the epididymis largest measures 2.8 x 1.7 cm. Tiny hydrocele No varicocele.                                                                      IMPRESSION:  1.  Normal testicles.     2.  Multiple epididymal cysts on the left side largest 2.8 cm.     3.  Tiny left hydrocele     Assessment & Plan   Left epididymal cyst    It is my pleasure to speak with Ashutosh in regards to his recently discovered multiple left epididymal cysts.  After reviewing his clinical history, we discussed that these  epididymal cysts are not anything scary in regards to cancer or danger, but rather these are benign findings that are incidental and can grow and shrink with no intervention whatsoever.  Generally speaking given their benign nature, will only intervene if there is significant pain or bother from the cysts.  We spent some time reviewing what a total or partial epididymectomy would entail and the risks that would go along with this, specifically the risk of possible loss of the testicle and risk of chronic scrotal content pain.  Ultimately I believe he has 2 options: Observation of the epididymal cysts with occasional NSAID use and tighter fitting underwear versus proceeding with first available surgery.    As of now, Ashutosh is most comfortable observing his epididymal cysts, and will follow-up with urology as needed. Mr. Vieira expressed understanding and agreement to the above discussion and plan and all of his questions were answered to his satisfaction.     PLAN  Observation of epididymal cysts   Follow-up with urology as needed     SIGNED    Gordy Otoole PA-C      I spent a total of 20 minutes spent on the date of the encounter doing chart review, history and exam, documentation, and further activities as noted above.    Virtual Visit Details    Type of service:  Video Visit     Originating Location (pt. Location): Home    Distant Location (provider location):  Off-site  Platform used for Video Visit: Gerard

## 2024-05-16 NOTE — PROGRESS NOTES
Virtual Visit Details    Type of service:  Video Visit     Originating Location (pt. Location): Home    Distant Location (provider location):  Off-site  Platform used for Video Visit: Gerard

## 2024-05-16 NOTE — NURSING NOTE
Is the patient currently in the state of [place]? YES    Visit mode:VIDEO    If the visit is dropped, the patient can be reconnected by: VIDEO VISIT: Text to cell phone:   Telephone Information:   Mobile [phone]    and VIDEO VISIT: Send to e-mail at: [email or web address]    Will anyone else be joining the visit? NO  (If patient encounters technical issues they should call [phone])    How would you like to obtain your AVS? MyChart    Are changes needed to the allergy or medication list? No    Are refills needed on medications prescribed by this physician? NO    Reason for visit: Consult    Nayana CORNEJO

## 2024-06-12 ENCOUNTER — TRANSFERRED RECORDS (OUTPATIENT)
Dept: HEALTH INFORMATION MANAGEMENT | Facility: CLINIC | Age: 69
End: 2024-06-12
Payer: COMMERCIAL

## 2024-06-29 ENCOUNTER — HEALTH MAINTENANCE LETTER (OUTPATIENT)
Age: 69
End: 2024-06-29

## 2024-07-19 ENCOUNTER — MYC MEDICAL ADVICE (OUTPATIENT)
Dept: FAMILY MEDICINE | Facility: CLINIC | Age: 69
End: 2024-07-19
Payer: COMMERCIAL

## 2024-07-19 DIAGNOSIS — E11.9 TYPE 2 DIABETES MELLITUS WITHOUT COMPLICATION, WITHOUT LONG-TERM CURRENT USE OF INSULIN (H): ICD-10-CM

## 2024-07-19 DIAGNOSIS — I10 ESSENTIAL HYPERTENSION: ICD-10-CM

## 2024-07-19 RX ORDER — METOPROLOL SUCCINATE 25 MG/1
25 TABLET, EXTENDED RELEASE ORAL DAILY
Qty: 90 TABLET | Refills: 1 | Status: SHIPPED | OUTPATIENT
Start: 2024-07-19

## 2024-07-19 NOTE — TELEPHONE ENCOUNTER
Spoke with patient to update him on his prescriptions which have been sent to the pharmacy. He verbalized understanding.    Inocencio Oquendo RN  [facility]

## 2024-09-07 ENCOUNTER — HEALTH MAINTENANCE LETTER (OUTPATIENT)
Age: 69
End: 2024-09-07

## 2024-10-22 DIAGNOSIS — E11.9 TYPE 2 DIABETES MELLITUS WITHOUT COMPLICATION, WITHOUT LONG-TERM CURRENT USE OF INSULIN (H): ICD-10-CM

## 2024-12-04 ENCOUNTER — TRANSFERRED RECORDS (OUTPATIENT)
Dept: HEALTH INFORMATION MANAGEMENT | Facility: CLINIC | Age: 69
End: 2024-12-04
Payer: COMMERCIAL

## 2025-01-02 ENCOUNTER — PATIENT OUTREACH (OUTPATIENT)
Dept: FAMILY MEDICINE | Facility: CLINIC | Age: 70
End: 2025-01-02
Payer: COMMERCIAL

## 2025-01-02 NOTE — TELEPHONE ENCOUNTER
Patient Quality Outreach    Patient is due for the following:   Physical Annual Wellness Visit    Action(s) Taken:   Schedule a Annual Wellness Visit    Type of outreach:    Sent [email or web address] message.    Questions for provider review:    None           Laureen Lackey MA

## 2025-01-05 ENCOUNTER — HEALTH MAINTENANCE LETTER (OUTPATIENT)
Age: 70
End: 2025-01-05

## 2025-01-15 DIAGNOSIS — I10 ESSENTIAL HYPERTENSION: ICD-10-CM

## 2025-01-15 RX ORDER — METOPROLOL SUCCINATE 25 MG/1
25 TABLET, EXTENDED RELEASE ORAL DAILY
Qty: 90 TABLET | Refills: 0 | Status: SHIPPED | OUTPATIENT
Start: 2025-01-15

## 2025-01-16 RX ORDER — SIMVASTATIN 80 MG
80 TABLET ORAL EVERY EVENING
Qty: 90 TABLET | Refills: 3 | Status: CANCELLED | OUTPATIENT
Start: 2025-01-16

## 2025-01-17 ENCOUNTER — OFFICE VISIT (OUTPATIENT)
Dept: FAMILY MEDICINE | Facility: CLINIC | Age: 70
End: 2025-01-17
Payer: COMMERCIAL

## 2025-01-17 VITALS
BODY MASS INDEX: 31.71 KG/M2 | DIASTOLIC BLOOD PRESSURE: 72 MMHG | WEIGHT: 226.5 LBS | HEIGHT: 71 IN | OXYGEN SATURATION: 94 % | SYSTOLIC BLOOD PRESSURE: 118 MMHG | HEART RATE: 71 BPM | TEMPERATURE: 97.8 F | RESPIRATION RATE: 18 BRPM

## 2025-01-17 DIAGNOSIS — I10 ESSENTIAL HYPERTENSION: ICD-10-CM

## 2025-01-17 DIAGNOSIS — E78.00 PURE HYPERCHOLESTEROLEMIA: ICD-10-CM

## 2025-01-17 DIAGNOSIS — E11.9 TYPE 2 DIABETES MELLITUS WITHOUT COMPLICATION, WITHOUT LONG-TERM CURRENT USE OF INSULIN (H): ICD-10-CM

## 2025-01-17 DIAGNOSIS — Z00.00 ROUTINE GENERAL MEDICAL EXAMINATION AT A HEALTH CARE FACILITY: Primary | ICD-10-CM

## 2025-01-17 DIAGNOSIS — Z12.5 SCREENING FOR PROSTATE CANCER: ICD-10-CM

## 2025-01-17 DIAGNOSIS — I71.43 INFRARENAL ABDOMINAL AORTIC ANEURYSM (AAA) WITHOUT RUPTURE: ICD-10-CM

## 2025-01-17 LAB
EST. AVERAGE GLUCOSE BLD GHB EST-MCNC: 177 MG/DL
HBA1C MFR BLD: 7.8 % (ref 0–5.6)

## 2025-01-17 PROCEDURE — G0103 PSA SCREENING: HCPCS | Performed by: NURSE PRACTITIONER

## 2025-01-17 PROCEDURE — 80053 COMPREHEN METABOLIC PANEL: CPT | Performed by: NURSE PRACTITIONER

## 2025-01-17 PROCEDURE — 82570 ASSAY OF URINE CREATININE: CPT | Performed by: NURSE PRACTITIONER

## 2025-01-17 PROCEDURE — 99397 PER PM REEVAL EST PAT 65+ YR: CPT | Performed by: NURSE PRACTITIONER

## 2025-01-17 PROCEDURE — 80061 LIPID PANEL: CPT | Performed by: NURSE PRACTITIONER

## 2025-01-17 PROCEDURE — 82043 UR ALBUMIN QUANTITATIVE: CPT | Performed by: NURSE PRACTITIONER

## 2025-01-17 PROCEDURE — 36415 COLL VENOUS BLD VENIPUNCTURE: CPT | Performed by: NURSE PRACTITIONER

## 2025-01-17 PROCEDURE — 83036 HEMOGLOBIN GLYCOSYLATED A1C: CPT | Performed by: NURSE PRACTITIONER

## 2025-01-17 RX ORDER — METOPROLOL SUCCINATE 25 MG/1
25 TABLET, EXTENDED RELEASE ORAL DAILY
Qty: 90 TABLET | Refills: 3 | Status: SHIPPED | OUTPATIENT
Start: 2025-01-17

## 2025-01-17 RX ORDER — ATORVASTATIN CALCIUM 40 MG/1
40 TABLET, FILM COATED ORAL DAILY
Qty: 90 TABLET | Refills: 3 | Status: SHIPPED | OUTPATIENT
Start: 2025-01-17

## 2025-01-17 RX ORDER — AMLODIPINE BESYLATE 10 MG/1
10 TABLET ORAL DAILY
Qty: 90 TABLET | Refills: 3 | Status: SHIPPED | OUTPATIENT
Start: 2025-01-17

## 2025-01-17 RX ORDER — LOSARTAN POTASSIUM 100 MG/1
100 TABLET ORAL DAILY
Qty: 90 TABLET | Refills: 3 | Status: SHIPPED | OUTPATIENT
Start: 2025-01-17

## 2025-01-17 SDOH — HEALTH STABILITY: PHYSICAL HEALTH: ON AVERAGE, HOW MANY DAYS PER WEEK DO YOU ENGAGE IN MODERATE TO STRENUOUS EXERCISE (LIKE A BRISK WALK)?: 0 DAYS

## 2025-01-17 ASSESSMENT — SOCIAL DETERMINANTS OF HEALTH (SDOH): HOW OFTEN DO YOU GET TOGETHER WITH FRIENDS OR RELATIVES?: TWICE A WEEK

## 2025-01-17 ASSESSMENT — PAIN SCALES - GENERAL: PAINLEVEL_OUTOF10: NO PAIN (0)

## 2025-01-17 NOTE — PATIENT INSTRUCTIONS
I went ahead and switched out your simvastatin for atorvastatin.  This is still taken just once a day.  You should not feel any different on this but if you do get side effect issues, let me know.  As you get older this medicine will be safer for you in the long run.    Since a year has passed, I reordered that ultrasound of the aneurysm.  Scheduling information is highlighted in your paperwork.    Updating labs today.  Other refills sent to the pharmacy.    If interested in that RSV vaccine I would encourage you to double check insurance coverage prior to getting it because it can be quite expensive.  Medicare only covers that at the pharmacy if you have Medicare part D          Patient Education   Preventive Care Advice   This is general advice given by our system to help you stay healthy. However, your care team may have specific advice just for you. Please talk to your care team about your preventive care needs.  Nutrition  Eat 5 or more servings of fruits and vegetables each day.  Try wheat bread, brown rice and whole grain pasta (instead of white bread, rice, and pasta).  Get enough calcium and vitamin D. Check the label on foods and aim for 100% of the RDA (recommended daily allowance).  Lifestyle  Exercise at least 150 minutes each week  (30 minutes a day, 5 days a week).  Do muscle strengthening activities 2 days a week. These help control your weight and prevent disease.  No smoking.  Wear sunscreen to prevent skin cancer.  Have a dental exam and cleaning every 6 months.  Yearly exams  See your health care team every year to talk about:  Any changes in your health.  Any medicines your care team has prescribed.  Preventive care, family planning, and ways to prevent chronic diseases.  Shots (vaccines)   HPV shots (up to age 26), if you've never had them before.  Hepatitis B shots (up to age 59), if you've never had them before.  COVID-19 shot: Get this shot when it's due.  Flu shot: Get a flu shot every  year.  Tetanus shot: Get a tetanus shot every 10 years.  Pneumococcal, hepatitis A, and RSV shots: Ask your care team if you need these based on your risk.  Shingles shot (for age 50 and up)  General health tests  Diabetes screening:  Starting at age 35, Get screened for diabetes at least every 3 years.  If you are younger than age 35, ask your care team if you should be screened for diabetes.  Cholesterol test: At age 39, start having a cholesterol test every 5 years, or more often if advised.  Bone density scan (DEXA): At age 50, ask your care team if you should have this scan for osteoporosis (brittle bones).  Hepatitis C: Get tested at least once in your life.  STIs (sexually transmitted infections)  Before age 24: Ask your care team if you should be screened for STIs.  After age 24: Get screened for STIs if you're at risk. You are at risk for STIs (including HIV) if:  You are sexually active with more than one person.  You don't use condoms every time.  You or a partner was diagnosed with a sexually transmitted infection.  If you are at risk for HIV, ask about PrEP medicine to prevent HIV.  Get tested for HIV at least once in your life, whether you are at risk for HIV or not.  Cancer screening tests  Cervical cancer screening: If you have a cervix, begin getting regular cervical cancer screening tests starting at age 21.  Breast cancer scan (mammogram): If you've ever had breasts, begin having regular mammograms starting at age 40. This is a scan to check for breast cancer.  Colon cancer screening: It is important to start screening for colon cancer at age 45.  Have a colonoscopy test every 10 years (or more often if you're at risk) Or, ask your provider about stool tests like a FIT test every year or Cologuard test every 3 years.  To learn more about your testing options, visit:   .  For help making a decision, visit:   https://bit.ly/jj75612.  Prostate cancer screening test: If you have a prostate, ask your  care team if a prostate cancer screening test (PSA) at age 55 is right for you.  Lung cancer screening: If you are a current or former smoker ages 50 to 80, ask your care team if ongoing lung cancer screenings are right for you.  For informational purposes only. Not to replace the advice of your health care provider. Copyright   2023 [facility]. All rights reserved. Clinically reviewed by the [facility]. [employer] [id number] - REV 01/24.  Preventing Falls: Care Instructions  Injuries and health problems such as trouble walking or poor eyesight can increase your risk of falling. So can some medicines. But there are things you can do to help prevent falls. You can exercise to get stronger. You can also arrange your home to make it safer.    Talk to your doctor about the medicines you take. Ask if any of them increase the risk of falls and whether they can be changed or stopped.   Try to exercise regularly. It can help improve your strength and balance. This can help lower your risk of falling.         Practice fall safety and prevention.   Wear low-heeled shoes that fit well and give your feet good support. Talk to your doctor if you have foot problems that make this hard.  Carry a cellphone or wear a medical alert device that you can use to call for help.  Use stepladders instead of chairs to reach high objects. Don't climb if you're at risk for falls. Ask for help, if needed.  Wear the correct eyeglasses, if you need them.        Make your home safer.   Remove rugs, cords, clutter, and furniture from walkways.  Keep your house well lit. Use night-lights in hallways and bathrooms.  Install and use sturdy handrails on stairways.  Wear nonskid footwear, even inside. Don't walk barefoot or in socks without shoes.        Be safe outside.   Use handrails, curb cuts, and ramps whenever possible.  Keep your hands free by using a shoulder bag or backpack.  Try to walk in well-lit areas.  "Watch out for uneven ground, changes in pavement, and debris.  Be careful in the winter. Walk on the grass or gravel when sidewalks are slippery. Use de-icer on steps and walkways. Add non-slip devices to shoes.    Put grab bars and nonskid mats in your shower or tub and near the toilet. Try to use a shower chair or bath bench when bathing.   Get into a tub or shower by putting in your weaker leg first. Get out with your strong side first. Have a phone or medical alert device in the bathroom with you.   Where can you learn more?  Go to https://www.[employer].net/patiented  Enter G117 in the search box to learn more about \"Preventing Falls: Care Instructions.\"  Current as of: July 31, 2024  Content Version: 14.3    2024 [employer].   Care instructions adapted under license by your healthcare professional. If you have questions about a medical condition or this instruction, always ask your healthcare professional. [employer] disclaims any warranty or liability for your use of this information.    Hearing Loss: Care Instructions  Overview     Hearing loss is a sudden or slow decrease in how well you hear. It can range from slight to profound. Permanent hearing loss can occur with aging. It also can happen when you are exposed long-term to loud noise. Examples include listening to loud music, riding motorcycles, or being around other loud machines.  Hearing loss can affect your work and home life. It can make you feel lonely or depressed. You may feel that you have lost your independence. But hearing aids and other devices can help you hear better and feel connected to others.  Follow-up care is a key part of your treatment and safety. Be sure to make and go to all appointments, and call your doctor if you are having problems. It's also a good idea to know your test results and keep a list of the medicines you take.  How can you care for yourself at home?  Avoid loud noises whenever possible. " This helps keep your hearing from getting worse.  Always wear hearing protection around loud noises.  Wear a hearing aid as directed.  A professional can help you pick a hearing aid that will work best for you.  You can also get hearing aids over the counter for mild to moderate hearing loss.  Have hearing tests as your doctor suggests. They can show whether your hearing has changed. Your hearing aid may need to be adjusted.  Use other devices as needed. These may include:  Telephone amplifiers and hearing aids that can connect to a television, stereo, radio, or microphone.  Devices that use lights or vibrations. These alert you to the doorbell, a ringing telephone, or a baby monitor.  Television closed-captioning. This shows the words at the bottom of the screen. Most new TVs can do this.  TTY (text telephone). This lets you type messages back and forth on the telephone instead of talking or listening. These devices are also called TDD. When messages are typed on the keyboard, they are sent over the phone line to a receiving TTY. The message is shown on a monitor.  Use text messaging, social media, and email if it is hard for you to communicate by telephone.  Try to learn a listening technique called speechreading. It is not lipreading. You pay attention to people's gestures, expressions, posture, and tone of voice. These clues can help you understand what a person is saying. Face the person you are talking to, and have them face you. Make sure the lighting is good. You need to see the other person's face clearly.  Think about counseling if you need help to adjust to your hearing loss.  When should you call for help?  Watch closely for changes in your health, and be sure to contact your doctor if:    You think your hearing is getting worse.     You have new symptoms, such as dizziness or nausea.   Where can you learn more?  Go to https://www.healthwise.net/patiented  Enter R798 in the search box to learn more about  "\"Hearing Loss: Care Instructions.\"  Current as of: September 27, 2023  Content Version: 14.3    2024 [employer].   Care instructions adapted under license by your healthcare professional. If you have questions about a medical condition or this instruction, always ask your healthcare professional. [employer] disclaims any warranty or liability for your use of this information.       "

## 2025-01-17 NOTE — PROGRESS NOTES
"Preventive Care Visit  [facility]  Raghavendra Zafar NP,    Jan 17, 2025      Assessment & Plan       ICD-10-CM    1. Routine general medical examination at a health care facility  Z00.00       2. Essential hypertension  I10 amLODIPine (NORVASC) 10 MG tablet     losartan (COZAAR) 100 MG tablet     metoprolol succinate ER (TOPROL XL) 25 MG 24 hr tablet      3. Type 2 diabetes mellitus without complication, without long-term current use of insulin (H)  E11.9 metFORMIN (GLUCOPHAGE) 1000 MG tablet     HEMOGLOBIN A1C     Albumin Random Urine Quantitative with Creat Ratio     Comprehensive metabolic panel (BMP + Alb, Alk Phos, ALT, AST, Total. Bili, TP)     HEMOGLOBIN A1C     Albumin Random Urine Quantitative with Creat Ratio     Comprehensive metabolic panel (BMP + Alb, Alk Phos, ALT, AST, Total. Bili, TP)      4. Essential Hypercholesterolemia  E78.00 Lipid panel reflex to direct LDL Fasting     atorvastatin (LIPITOR) 40 MG tablet     Lipid panel reflex to direct LDL Fasting      5. Screening for prostate cancer  Z12.5 PSA, screen     PSA, screen      6. Infrarenal abdominal aortic aneurysm (AAA) without rupture  I71.43 US Abdominal Aorta Imaging        Doing well.  Due for repeat AAA screening.  Update med monitoring labs.  Reviewed vaccine opportunities.  Colonoscopy up-to-date.  Switch out simvastatin for atorvastatin.  Reviewed healthy lifestyle behaviors.              BMI  Estimated body mass index is 31.59 kg/m  as calculated from the following:    Height as of this encounter: 1.803 m (5' 11\").    Weight as of this encounter: 102.7 kg (226 lb 8 oz).       Counseling  Appropriate preventive services were addressed with this patient via screening, questionnaire, or discussion as appropriate for fall prevention, nutrition, physical activity, Tobacco-use cessation, social engagement, weight loss and cognition.  Checklist reviewing preventive services available has been given to the " patient.  Reviewed patient's diet, addressing concerns and/or questions.   The patient was provided with written information regarding signs of hearing loss.           Margaret Boykin is a 69 year old, presenting for the following:  Annual Visit (Fasting)        1/17/2025     3:08 PM   Additional Questions   Roomed by Laureen Lackey CMA          History of Present Illness       Reason for visit:  Med check. RSV vaccine.   He is taking medications regularly.    Lab Results   Component Value Date    A1C 7.2 04/16/2024    A1C 7.3 12/22/2023    A1C 6.9 05/26/2023    A1C 7.2 12/01/2022    A1C 7.1 09/08/2022     Is looking to retire July of this year.  1 year has passed since abdominal aneurysm imaging.  Due for recheck.    Health Care Directive  Patient does not have a Health Care Directive: previously reviewed      1/17/2025   General Health   How would you rate your overall physical health? Good   Feel stress (tense, anxious, or unable to sleep) Only a little   (!) STRESS CONCERN      1/17/2025   Nutrition   Diet: Diabetic         1/17/2025   Exercise   Days per week of moderate/strenous exercise 0 days   (!) EXERCISE CONCERN      1/17/2025   Social Factors   Frequency of gathering with friends or relatives Twice a week   Worry food won't last until get money to buy more No   Food not last or not have enough money for food? No   Do you have housing? (Housing is defined as stable permanent housing and does not include staying ouside in a car, in a tent, in an abandoned building, in an overnight shelter, or couch-surfing.) Yes   Are you worried about losing your housing? No   Lack of transportation? No   Unable to get utilities (heat,electricity)? No         1/17/2025   Fall Risk   Fallen 2 or more times in the past year? Yes   Trouble with walking or balance? No   Gait Speed Test (Document in seconds) 4   Gait Speed Test Interpretation Less than or equal to 5.00 seconds - PASS          1/17/2025   Activities of Daily  Living- Home Safety   Needs help with the following daily activites None of the above   Safety concerns in the home None of the above         1/17/2025   Dental   Dentist two times every year? Yes         1/17/2025   Hearing Screening   Hearing concerns? (!) I NEED TO ASK PEOPLE TO SPEAK UP OR REPEAT THEMSELVES.    (!) IT'S HARD TO FOLLOW A CONVERSATION IN A NOISY RESTAURANT OR CROWDED ROOM.    (!) TROUBLE UNDERSTANDING SOFT OR WHISPERED SPEECH.       Multiple values from one day are sorted in reverse-chronological order         1/17/2025   Driving Risk Screening   Patient/family members have concerns about driving No         1/17/2025   General Alertness/Fatigue Screening   Have you been more tired than usual lately? No         1/17/2025   Urinary Incontinence Screening   Bothered by leaking urine in past 6 months No         1/17/2025   TB Screening   Were you born outside of the [place]? No         Today's PHQ-2 Score:       1/17/2025     8:02 AM   PHQ-2 ( 1999 [employer])   Q1: Little interest or pleasure in doing things 0   Q2: Feeling down, depressed or hopeless 0   PHQ-2 Score 0    Q1: Little interest or pleasure in doing things Not at all   Q2: Feeling down, depressed or hopeless Not at all   PHQ-2 Score 0       Patient-reported           1/17/2025   Substance Use   Alcohol more than 3/day or more than 7/wk No   Do you have a current opioid prescription? No   How severe/bad is pain from 1 to 10? 4/10   Do you use any other substances recreationally? No     Social History     Tobacco Use    Smoking status: Passive Smoke Exposure - Never Smoker     Passive exposure: Current    Smokeless tobacco: Never    Tobacco comments:     Cigars during weekly golf.   Vaping Use    Vaping status: Never Used   Substance Use Topics    Alcohol use: Yes     Comment: Alcoholic Drinks/day: weekends    Drug use: No           1/17/2025   AAA Screening   Family history of Abdominal Aortic Aneurysm (AAA)? Unsure   Last PSA:   Prostate Specific  Antigen Screen   Date Value Ref Range Status   05/26/2023 0.86 0.00 - 4.50 ng/mL Final   07/01/2021 1.1 0.0 - 4.5 ng/mL Final     ASCVD Risk   The ASCVD Risk score (Noelle MORIN, et al., 2019) failed to calculate for the following reasons:    The valid total cholesterol range is 130 to 320 mg/dL    Reviewed and updated as needed this visit by Provider                    Past Medical History:   Diagnosis Date    Allergic rhinitis     Allergic rhinitis     Arthritis     Diabetes mellitus (H)     Diabetes mellitus (H)     Erectile dysfunction     Erectile dysfunction     Hyperlipemia     Hyperlipemia     Hypertension     Hypertension     Obesity (BMI 35.0-39.9 without comorbidity)     Obesity (BMI 35.0-39.9 without comorbidity)     ANA (obstructive sleep apnea)     ANA (obstructive sleep apnea)     Plantar fasciitis     Plantar fasciitis     Prepatellar bursitis     Prepatellar bursitis     Tinnitus     Tinnitus      Past Surgical History:   Procedure Laterality Date    ARTHROPLASTY HIP ANTERIOR Right 12/07/2022    Procedure: RIGHT DIRECT ANTERIOR TOTAL HIP ARTHROPLASTY;  Surgeon: Navin Londono MD;  Location: [facility] OR    AS TOTAL HIP ARTHROPLASTY Left      Current providers sharing in care for this patient include:  Patient Care Team:  Raghavendra Zafar NP as PCP - General  Raghavendra Zafar NP as Assigned PCP  Gordy Otoole PA-C as Assigned Surgical Provider  Destiny Yu AuD as Audiologist (Audiology)    The following health maintenance items are reviewed in Epic and correct as of today:  Health Maintenance   Topic Date Due    RSV VACCINE (1 - Risk 60-74 years 1-dose series) Never done    ANNUAL REVIEW OF HM ORDERS  12/28/2022    MEDICARE ANNUAL WELLNESS VISIT  05/26/2024    LIPID  05/26/2024    A1C  07/16/2024    MICROALBUMIN  12/22/2024    DIABETIC FOOT EXAM  12/22/2024    BMP  04/16/2025    EYE EXAM  12/04/2025    FALL RISK ASSESSMENT  01/17/2026    ADVANCE CARE PLANNING  09/08/2027     "COLORECTAL CANCER SCREENING  04/15/2029    DTAP/TDAP/TD IMMUNIZATION (4 - Td or Tdap) 10/27/2030    HEPATITIS C SCREENING  Completed    PHQ-2 (once per calendar year)  Completed    INFLUENZA VACCINE  Completed    Pneumococcal Vaccine: 50+ Years  Completed    ZOSTER IMMUNIZATION  Completed    COVID-19 Vaccine  Completed    HPV IMMUNIZATION  Aged Out    MENINGITIS IMMUNIZATION  Aged Out    RSV MONOCLONAL ANTIBODY  Aged Out         Review of Systems  Constitutional, HEENT, cardiovascular, pulmonary, gi and gu systems are negative, except as otherwise noted.     Objective    Exam  /72 (BP Location: Left arm, Patient Position: Sitting, Cuff Size: Adult Regular)   Pulse 71   Temp 97.8  F (36.6  C) (Oral)   Resp 18   Ht 1.803 m (5' 11\")   Wt 102.7 kg (226 lb 8 oz)   SpO2 94%   BMI 31.59 kg/m     Estimated body mass index is 31.59 kg/m  as calculated from the following:    Height as of this encounter: 1.803 m (5' 11\").    Weight as of this encounter: 102.7 kg (226 lb 8 oz).    Physical Exam  GENERAL: alert and no distress  EYES: Eyes grossly normal to inspection, PERRL and conjunctivae and sclerae normal  HENT: ear canals and TM's normal, nose and mouth without ulcers or lesions  NECK: no adenopathy, no asymmetry, masses, or scars  RESP: lungs clear to auscultation - no rales, rhonchi or wheezes  CV: regular rate and rhythm, normal S1 S2, no S3 or S4, no murmur, click or rub, no peripheral edema  ABDOMEN: soft, nontender, no hepatosplenomegaly, no masses and bowel sounds normal  MS: no gross musculoskeletal defects noted, no edema  SKIN: no suspicious lesions or rashes  NEURO: Normal strength and tone, mentation intact and speech normal  PSYCH: mentation appears normal, affect normal/bright        1/17/2025   Mini Cog   Clock Draw Score 2 Normal   3 Item Recall 3 objects recalled   Mini Cog Total Score 5       Signed Electronically by: Raghavendra Zafar NP  "

## 2025-01-18 LAB
ALBUMIN SERPL BCG-MCNC: 4.4 G/DL (ref 3.5–5.2)
ALP SERPL-CCNC: 85 U/L (ref 40–150)
ALT SERPL W P-5'-P-CCNC: 18 U/L (ref 0–70)
ANION GAP SERPL CALCULATED.3IONS-SCNC: 11 MMOL/L (ref 7–15)
AST SERPL W P-5'-P-CCNC: 21 U/L (ref 0–45)
BILIRUB SERPL-MCNC: 0.7 MG/DL
BUN SERPL-MCNC: 12.1 MG/DL (ref 8–23)
CALCIUM SERPL-MCNC: 9.1 MG/DL (ref 8.8–10.4)
CHLORIDE SERPL-SCNC: 101 MMOL/L (ref 98–107)
CHOLEST SERPL-MCNC: 158 MG/DL
CREAT SERPL-MCNC: 0.71 MG/DL (ref 0.67–1.17)
CREAT UR-MCNC: 105 MG/DL
EGFRCR SERPLBLD CKD-EPI 2021: >90 ML/MIN/1.73M2
FASTING STATUS PATIENT QL REPORTED: YES
FASTING STATUS PATIENT QL REPORTED: YES
GLUCOSE SERPL-MCNC: 128 MG/DL (ref 70–99)
HCO3 SERPL-SCNC: 28 MMOL/L (ref 22–29)
HDLC SERPL-MCNC: 37 MG/DL
LDLC SERPL CALC-MCNC: 93 MG/DL
MICROALBUMIN UR-MCNC: 192 MG/L
MICROALBUMIN/CREAT UR: 182.86 MG/G CR (ref 0–17)
NONHDLC SERPL-MCNC: 121 MG/DL
POTASSIUM SERPL-SCNC: 3.8 MMOL/L (ref 3.4–5.3)
PROT SERPL-MCNC: 7.1 G/DL (ref 6.4–8.3)
PSA SERPL DL<=0.01 NG/ML-MCNC: 0.89 NG/ML (ref 0–4.5)
SODIUM SERPL-SCNC: 140 MMOL/L (ref 135–145)
TRIGL SERPL-MCNC: 140 MG/DL

## 2025-01-23 ENCOUNTER — HOSPITAL ENCOUNTER (OUTPATIENT)
Dept: ULTRASOUND IMAGING | Facility: CLINIC | Age: 70
End: 2025-01-23
Attending: NURSE PRACTITIONER
Payer: COMMERCIAL

## 2025-01-23 DIAGNOSIS — I71.43 INFRARENAL ABDOMINAL AORTIC ANEURYSM (AAA) WITHOUT RUPTURE: ICD-10-CM

## 2025-01-23 PROCEDURE — 76775 US EXAM ABDO BACK WALL LIM: CPT

## 2025-01-27 ENCOUNTER — TRANSFERRED RECORDS (OUTPATIENT)
Dept: HEALTH INFORMATION MANAGEMENT | Facility: CLINIC | Age: 70
End: 2025-01-27
Payer: COMMERCIAL

## 2025-02-08 ENCOUNTER — E-VISIT (OUTPATIENT)
Dept: URGENT CARE | Facility: CLINIC | Age: 70
End: 2025-02-08
Payer: COMMERCIAL

## 2025-02-08 DIAGNOSIS — J06.9 ACUTE UPPER RESPIRATORY INFECTION, UNSPECIFIED: Primary | ICD-10-CM

## 2025-02-08 RX ORDER — FLUTICASONE PROPIONATE 50 MCG
2 SPRAY, SUSPENSION (ML) NASAL DAILY
Qty: 16 G | Refills: 0 | Status: SHIPPED | OUTPATIENT
Start: 2025-02-08

## 2025-02-08 RX ORDER — ALBUTEROL SULFATE 90 UG/1
2 INHALANT RESPIRATORY (INHALATION) EVERY 4 HOURS PRN
Qty: 18 G | Refills: 0 | Status: SHIPPED | OUTPATIENT
Start: 2025-02-08

## 2025-02-08 NOTE — PATIENT INSTRUCTIONS
Viral Respiratory Infection: Care Instructions  Overview     A viral respiratory infection is an infection of the nose, sinuses, or throat caused by a virus. Colds and the flu are common types of viral respiratory infections.  The symptoms of a viral respiratory infection often start quickly. They include a fever, sore throat, and runny nose. You may also just not feel well. Or you may not want to eat much.  Most viral infections can be treated with home care. This may include drinking lots of fluids and taking over-the-counter pain medicine. You will probably feel better in 4 to 10 days.  Antibiotics are not used to treat a viral infection. Antibiotics don't kill viruses, so they won't help cure a viral illness.  In some cases, a doctor may prescribe antiviral medicine to help your body fight a serious viral infection.  Follow-up care is a key part of your treatment and safety. Be sure to make and go to all appointments, and call your doctor if you are having problems. It's also a good idea to know your test results and keep a list of the medicines you take.  How can you care for yourself at home?  To prevent dehydration, drink plenty of fluids. Choose water and other clear liquids until you feel better. If you have kidney, heart, or liver disease and have to limit fluids, talk with your doctor before you increase the amount of fluids you drink.  Ask your doctor if you can take an over-the-counter pain medicine, such as acetaminophen (Tylenol), ibuprofen (Advil, Motrin), or naproxen (Aleve). Be safe with medicines. Read and follow all instructions on the label. No one younger than 20 should take aspirin. It has been linked to Reye syndrome, a serious illness.  Be careful when taking over-the-counter cold or flu medicines and Tylenol at the same time. Many of these medicines have acetaminophen, which is Tylenol. Read the labels to make sure that you are not taking more than the recommended dose. Too much  "acetaminophen (Tylenol) can be harmful.  Get plenty of rest.  Use saline (saltwater) nasal washes to help keep your nasal passages open and wash out mucus and allergens. You can buy saline nose sprays at a grocery store or drugstore. Follow the instructions on the package. Or you can make your own at home. Add 1 teaspoon of non-iodized salt and 1 teaspoon of baking soda to 2 cups of distilled or boiled and cooled water. Fill a squeeze bottle or neti pot with the nasal wash. Then put the tip into your nostril, and lean over the sink. With your mouth open, gently squirt the liquid. Repeat on the other side.  Use a vaporizer or humidifier to add moisture to your bedroom. Follow the instructions for cleaning the machine.  Do not smoke or allow others to smoke around you. If you need help quitting, talk to your doctor about stop-smoking programs and medicines. These can increase your chances of quitting for good.  When should you call for help?   Call 911 anytime you think you may need emergency care. For example, call if:    You have severe trouble breathing.   Call your doctor now or seek immediate medical care if:    You have a new or higher fever.     Your fever lasts more than 48 hours.     You have trouble breathing.     You have a fever with a stiff neck or a severe headache.     You are sensitive to light.     You feel very sleepy or confused.   Watch closely for changes in your health, and be sure to contact your doctor if:    You do not get better as expected.   Where can you learn more?  Go to https://www.[employer].net/patiented  Enter Q795 in the search box to learn more about \"Viral Respiratory Infection: Care Instructions.\"  Current as of: April 30, 2024  Content Version: 14.3    2024 [employer].   Care instructions adapted under license by your healthcare professional. If you have questions about a medical condition or this instruction, always ask your healthcare professional. [employer] disclaims any warranty or liability for your use of this information.    Thank you for choosing us for your care. Based on the information you've provided, it sounds like you have a virus.  Antibiotics are not indicated for virus infections, but I have prescribed a nasal spray and an inhaler, which may helped your symptoms.  I have placed an order for a prescription so that you can start treatment. View your full visit summary for details by clicking on the link below. Your pharmacist will able to address any questions you may have about the medication.     If you're not feeling better within 5-7 days, please schedule an appointment.  You can schedule an appointment right here in [facility], or call [phone]  If the visit is for the same symptoms as your eVisit, we'll refund the cost of your eVisit if seen within seven days.

## 2025-02-28 ENCOUNTER — HOSPITAL ENCOUNTER (OUTPATIENT)
Dept: CT IMAGING | Facility: HOSPITAL | Age: 70
Discharge: HOME | End: 2025-02-28
Attending: SURGERY
Payer: COMMERCIAL

## 2025-02-28 DIAGNOSIS — I71.43 INFRARENAL ABDOMINAL AORTIC ANEURYSM (AAA) WITHOUT RUPTURE: ICD-10-CM

## 2025-02-28 LAB
CREAT BLD-MCNC: 1 MG/DL (ref 0.7–1.3)
EGFRCR SERPLBLD CKD-EPI 2021: >60 ML/MIN/1.73M2

## 2025-02-28 PROCEDURE — 250N000011 HC RX IP 250 OP 636: Performed by: SURGERY

## 2025-02-28 PROCEDURE — 71275 CT ANGIOGRAPHY CHEST: CPT

## 2025-02-28 PROCEDURE — 74174 CTA ABD&PLVS W/CONTRAST: CPT

## 2025-02-28 PROCEDURE — 82565 ASSAY OF CREATININE: CPT

## 2025-02-28 RX ORDER — IOPAMIDOL 755 MG/ML
90 INJECTION, SOLUTION INTRAVASCULAR ONCE
Status: COMPLETED | OUTPATIENT
Start: 2025-02-28 | End: 2025-02-28

## 2025-02-28 RX ADMIN — IOPAMIDOL 90 ML: 755 INJECTION, SOLUTION INTRAVENOUS at 08:51

## 2025-04-20 ENCOUNTER — HEALTH MAINTENANCE LETTER (OUTPATIENT)
Age: 70
End: 2025-04-20

## 2025-05-27 ENCOUNTER — TELEPHONE (OUTPATIENT)
Dept: FAMILY MEDICINE | Facility: CLINIC | Age: 70
End: 2025-05-27
Payer: COMMERCIAL

## 2025-05-29 ENCOUNTER — OFFICE VISIT (OUTPATIENT)
Dept: FAMILY MEDICINE | Facility: CLINIC | Age: 70
End: 2025-05-29
Payer: COMMERCIAL

## 2025-05-29 VITALS
TEMPERATURE: 98.1 F | HEART RATE: 72 BPM | WEIGHT: 226.4 LBS | DIASTOLIC BLOOD PRESSURE: 88 MMHG | RESPIRATION RATE: 16 BRPM | OXYGEN SATURATION: 96 % | SYSTOLIC BLOOD PRESSURE: 134 MMHG | BODY MASS INDEX: 30.66 KG/M2 | HEIGHT: 72 IN

## 2025-05-29 DIAGNOSIS — H61.21 IMPACTED CERUMEN OF RIGHT EAR: Primary | ICD-10-CM

## 2025-05-29 DIAGNOSIS — G47.30 SLEEP APNEA, UNSPECIFIED TYPE: ICD-10-CM

## 2025-05-29 ASSESSMENT — PAIN SCALES - GENERAL: PAINLEVEL_OUTOF10: NO PAIN (0)

## 2025-05-29 NOTE — PROGRESS NOTES
Assessment & Plan     Impacted cerumen of right ear  Right TM partially obscured by wax. MA completed right ear wash and I was able to successfully view the full TM.   - AK REMOVAL IMPACTED CERUMEN IRRIGATION/LVG UNILAT    Sleep apnea, unspecified type  Needing new referral for sleep specialist to manage his CPAP prescriptions - requirement of the supplier he goes through. Referral placed.  - Adult Sleep Eval & Management  Referral      Subjective   Ashutosh is a 70 year old, presenting for the following health issues:  Cerumen Impaction (R ear impacted per [facility] hearing aide. Tried at home drops and solution to clear and still not cleared out. ) and CPAP Follow Up (Needs new provider to have supplies ordered. )        5/29/2025    10:14 AM   Additional Questions   Roomed by Janine CORONEL LPN     History of Present Illness       Reason for visit:  Hearing. Sleep Apnea  Symptoms include:  Need ears cleaning for hearing test  Symptom intensity:  Severe  Symptom progression:  Staying the same  Had these symptoms before:  Yes  Has tried/received treatment for these symptoms:  Yes  Previous treatment was successful:  No   He is taking medications regularly.        Seen at [facility] for hearing aides, however, noted to have a right cerumen impaction. Has been treating with debrox drops at home without any improvement.     Has had a CPAP machine for the past 20 years. Where he gets his supplies from wants him to have a sleep specialist.       Review of Systems  Constitutional, HEENT, cardiovascular, pulmonary, gi and gu systems are negative, except as otherwise noted.      Objective    /88 (BP Location: Left arm, Patient Position: Sitting, Cuff Size: Adult Regular)   Pulse 72   Temp 98.1  F (36.7  C) (Oral)   Resp 16   Ht 1.829 m (6')   Wt 102.7 kg (226 lb 6.4 oz)   SpO2 96%   BMI 30.71 kg/m    Body mass index is 30.71 kg/m .  Physical Exam   GENERAL: alert and no distress  HENT: normal cephalic/atraumatic,  right ear: partially occluded with wax, and left ear: normal: no effusions, no erythema, normal landmarks  RESP: lungs clear to auscultation - no rales, rhonchi or wheezes  PSYCH: mentation appears normal, affect normal/bright        Signed Electronically by: Jerri Yeh PA-C

## 2025-06-02 ENCOUNTER — PATIENT OUTREACH (OUTPATIENT)
Dept: CARE COORDINATION | Facility: CLINIC | Age: 70
End: 2025-06-02
Payer: COMMERCIAL

## 2025-07-14 DIAGNOSIS — E11.9 TYPE 2 DIABETES MELLITUS WITHOUT COMPLICATION, WITHOUT LONG-TERM CURRENT USE OF INSULIN (H): ICD-10-CM

## 2025-07-16 ENCOUNTER — TRANSFERRED RECORDS (OUTPATIENT)
Dept: HEALTH INFORMATION MANAGEMENT | Facility: CLINIC | Age: 70
End: 2025-07-16
Payer: COMMERCIAL

## 2025-07-22 ENCOUNTER — TRANSFERRED RECORDS (OUTPATIENT)
Dept: HEALTH INFORMATION MANAGEMENT | Facility: CLINIC | Age: 70
End: 2025-07-22
Payer: COMMERCIAL

## 2025-08-03 ENCOUNTER — HEALTH MAINTENANCE LETTER (OUTPATIENT)
Age: 70
End: 2025-08-03

## (undated) DEVICE — SUCTION MANIFOLD NEPTUNE 2 SYS 1 PORT 702-025-000

## (undated) DEVICE — DRSG AQUACEL AG 3.5X9.75" HYDROFIBER 412011

## (undated) DEVICE — DRAPE BACK TABLE PADDED 60X90

## (undated) DEVICE — SU MONOCRYL 3-0 PS-2 18" UND MCP497G

## (undated) DEVICE — GLOVE BIOGEL PI SZ 8.5 40885

## (undated) DEVICE — BLADE PRECISION 25X1.27X9 6725127090

## (undated) DEVICE — GLOVE SURG PI ULTRA TOUCH M SZ 7-1/2 LF

## (undated) DEVICE — DRAPE SHEET BILAT LIMB W/ ARM COVERS

## (undated) DEVICE — PACK MINOR SINGLE BASIN SSK3001

## (undated) DEVICE — DRAPE CONVERTORS U-DRAPE 60X72" 8476

## (undated) DEVICE — Device

## (undated) DEVICE — DRAPE STERI TOWEL LG 1010

## (undated) DEVICE — PITCHER STERILE 1000ML  SSK9004A

## (undated) DEVICE — DRAPE IOBAN INCISE 23X17" 6650EZ

## (undated) DEVICE — CUSTOM PACK GEN MAJOR SBA5BGMHEA

## (undated) DEVICE — ADH SKIN CLOSURE PREMIERPRO EXOFIN 1.0ML 3470

## (undated) DEVICE — A3 SUPPLIES- SEE NURSING INFO PAGE

## (undated) DEVICE — DRAPE STERI U 1015

## (undated) DEVICE — SOL NACL 0.9% IRRIG 1000ML BOTTLE 2F7124

## (undated) DEVICE — HOLDER LIMB VELCRO OR 0814-1533

## (undated) DEVICE — SUCTION TIP YANKAUER FLEX ORTHO K62

## (undated) DEVICE — PREP CHLORAPREP 26ML TINTED HI-LITE ORANGE 930815

## (undated) DEVICE — PLATE GROUNDING ADULT W/CORD 9165L

## (undated) DEVICE — NEEDLE HYPO 18X1-1/2 SAFETY 305918

## (undated) DEVICE — GLOVE BIOGEL INDICATOR 7.5 LF 41675

## (undated) DEVICE — DRAPE TOWEL IMPERVIOUS X2 7553

## (undated) DEVICE — DRAPE SHEET REV FOLD 3/4 9349

## (undated) DEVICE — SUTURE VICRYL+ 2-0 27IN CT-1 UND VCP259H

## (undated) DEVICE — GLOVE UNDER INDICATOR PI SZ 8.5 LF 41685

## (undated) DEVICE — GOWN IMPERVIOUS BREATHABLE 2XL/XLONG

## (undated) DEVICE — SYR 30ML LL W/O NDL 302832

## (undated) DEVICE — DECANTER VIAL 2006S

## (undated) DEVICE — GLOVE UNDER INDICATOR PI SZ 7.0 LF 41670

## (undated) DEVICE — SOL WATER IRRIG 1000ML BOTTLE 2F7114

## (undated) DEVICE — DRAPE STOCKINETTE IMPERVIOUS 12" 1587

## (undated) DEVICE — GOWN IMPERVIOUS BREATHABLE SMART LG 89015

## (undated) DEVICE — ESU ELEC BLADE 6" COATED E1450-6

## (undated) DEVICE — GLOVE BIOGEL PI SZ 6.5 40865

## (undated) DEVICE — DRESSING MEPILEX AG SILVER 4X8 395890

## (undated) DEVICE — ESU PENCIL SMOKE EVAC W/ROCKER SWITCH 0703-047-000

## (undated) RX ORDER — FENTANYL CITRATE 50 UG/ML
INJECTION, SOLUTION INTRAMUSCULAR; INTRAVENOUS
Status: DISPENSED
Start: 2022-12-07